# Patient Record
Sex: MALE | Race: WHITE
[De-identification: names, ages, dates, MRNs, and addresses within clinical notes are randomized per-mention and may not be internally consistent; named-entity substitution may affect disease eponyms.]

---

## 2024-08-19 ENCOUNTER — HOSPITAL ENCOUNTER (EMERGENCY)
Age: 29
Discharge: HOME | End: 2024-08-19
Payer: COMMERCIAL

## 2024-08-19 VITALS
HEART RATE: 87 BPM | TEMPERATURE: 98.06 F | SYSTOLIC BLOOD PRESSURE: 153 MMHG | OXYGEN SATURATION: 98 % | DIASTOLIC BLOOD PRESSURE: 96 MMHG

## 2024-08-19 VITALS — BODY MASS INDEX: 45 KG/M2

## 2024-08-19 DIAGNOSIS — R10.9: ICD-10-CM

## 2024-08-19 DIAGNOSIS — K52.9: Primary | ICD-10-CM

## 2024-08-19 LAB
ADD MANUAL DIFF: NO
ALANINE AMINOTRANSFERASE: 33 U/L (ref 16–63)
ALBUMIN GLOBULIN RATIO: 1.1
ALBUMIN LEVEL: 3.6 G/DL (ref 3.4–5)
ALKALINE PHOSPHATASE: 69 U/L (ref 46–116)
ANION GAP: 15.1
ASPARTATE AMINO TRANSFERASE: 14 U/L (ref 15–37)
BLOOD UREA NITROGEN: 10 MG/DL (ref 7–18)
CALCIUM: 9.1 MG/DL (ref 8.5–10.1)
CARBON DIOXIDE: 21.5 MMOL/L (ref 21–32)
CHLORIDE: 105 MMOL/L (ref 98–107)
CO2 BLD-SCNC: 21.5 MMOL/L (ref 21–32)
ESTIMATED GFR (AFRICAN AMERICA: >60 (ref 60–?)
ESTIMATED GFR (NON-AFRICAN AME: 59 (ref 60–?)
GLOBULIN: 3.4 G/DL
GLUCOSE BLD-MCNC: 90 MG/DL (ref 74–106)
GLUCOSE URINE UA: NEGATIVE MG/DL
HCT VFR BLD CALC: 48.3 % (ref 42–54)
HEMATOCRIT: 48.3 % (ref 42–54)
HEMOGLOBIN: 16.1 G/DL (ref 14–18)
IMMATURE GRANULOCYTES ABS AUTO: 0.03 10^3/UL (ref 0–0.03)
IMMATURE GRANULOCYTES PCT AUTO: 0.3 % (ref 0–0.5)
LACTATE/LACTIC ACID: 0.8 MMOL/L (ref 0.4–2)
LIPASE: 35 U/L (ref 16–77)
LYMPHOCYTES  ABSOLUTE AUTO: 2.9 10^3/UL (ref 1.2–3.8)
MCV RBC: 89.4 FL (ref 80–94)
MEAN CORPUSCULAR HEMOGLOBIN: 29.8 PG (ref 25.9–34)
MEAN CORPUSCULAR HGB CONC: 33.3 G/DL (ref 29.9–35.2)
MEAN CORPUSCULAR VOLUME: 89.4 FL (ref 80–94)
PLATELET # BLD: 216 10^3/UL (ref 150–450)
PLATELET COUNT: 216 10^3/UL (ref 150–450)
POTASSIUM SERPLBLD-SCNC: 3.6 MMOL/L (ref 3.5–5.1)
POTASSIUM: 3.6 MMOL/L (ref 3.5–5.1)
RED BLOOD COUNT: 5.4 10^6/UL (ref 4.7–6.1)
SODIUM BLD-SCNC: 138 MMOL/L (ref 136–145)
SODIUM: 138 MMOL/L (ref 136–145)
TOTAL PROTEIN: 7 G/DL (ref 6.4–8.2)
WBC # BLD: 9.9 10^3/UL (ref 4–11)
WHITE BLOOD COUNT: 9.9 10^3/UL (ref 4–11)

## 2024-08-19 PROCEDURE — 74177 CT ABD & PELVIS W/CONTRAST: CPT

## 2024-08-19 PROCEDURE — 96375 TX/PRO/DX INJ NEW DRUG ADDON: CPT

## 2024-08-19 PROCEDURE — 85025 COMPLETE CBC W/AUTO DIFF WBC: CPT

## 2024-08-19 PROCEDURE — 81003 URINALYSIS AUTO W/O SCOPE: CPT

## 2024-08-19 PROCEDURE — 99285 EMERGENCY DEPT VISIT HI MDM: CPT

## 2024-08-19 PROCEDURE — 96374 THER/PROPH/DIAG INJ IV PUSH: CPT

## 2024-08-19 PROCEDURE — 36415 COLL VENOUS BLD VENIPUNCTURE: CPT

## 2024-08-19 PROCEDURE — 83605 ASSAY OF LACTIC ACID: CPT

## 2024-08-19 PROCEDURE — 80053 COMPREHEN METABOLIC PANEL: CPT

## 2024-08-19 PROCEDURE — 96376 TX/PRO/DX INJ SAME DRUG ADON: CPT

## 2024-08-19 PROCEDURE — 83690 ASSAY OF LIPASE: CPT

## 2024-08-19 PROCEDURE — 96361 HYDRATE IV INFUSION ADD-ON: CPT

## 2024-08-19 NOTE — CT_ITS
The [address] [address] 
     [phone] 
  
  
Patient Name: 
HIMA CARDENAS 
  
MRN: [id number]    YOB: 1995    Sex: M 
Assigned Patient Location: ER 
Current Patient Location: ER 
Accession/Order Number: [id number] 
Exam Date: 8/19/2024  15:10    Report Date: 8/19/2024  15:53 
  
At the request of: 
SIOMARA DHILLON   
  
Procedure:  CT abdomen pelvis w con 
  
CT ABDOMEN/PELVIS WITH IV CONTRAST. 
  
INDICATION: Abdominal pain. 
  
COMPARISON: There are no other studies available for comparison. 
  
TECHNIQUE: Contiguous axial images were obtained from the lung bases to the  
pelvic floor following the intravenous administration of contrast. Coronal and  
  
sagittal reformations are provided.  
  
FINDINGS:  
  
LOWER LUNGS: Clear. 
  
LIVER/BILIARY TREE: No mass. No intrahepatic ductal dilatation. 
  
GALLBLADDER: No significant gallbladder wall thickening. No radiopaque stone. 
  
CBD: Normal CBD. 
  
SPLEEN: Normal in size. 
  
PANCREAS: No acute findings. No peripancreatic fluid or inflammation. No  
pancreatic duct dilatation. No discrete mass. 
  
ADRENALS: Normal.  
  
KIDNEYS: No hydronephrosis. No radiopaque calculus. 
  
STOMACH AND BOWEL: Stomach is unremarkable. No dilated bowel loops. No bowel  
wall thickening. 
  
APPENDIX: Appendicolith. Otherwise unremarkable appendix. 
  
PERITONEAL CAVITY: No fluid. No fat stranding. 
  
ABDOMINAL WALL: No subcutaneous stranding. There is an incidental 6 mm  
umbilical remnant cyst.. 
  
LYMPH NODES: No mesenteric or retroperitoneal lymphadenopathy by CT criteria.  
  
ABDOMINAL AORTA: No aneurysm. 
  
PELVIS: No acute abnormality. 
  
MUSCULOSKELETAL: No acute osseous abnormality. 
  
ORDER #: [id number] CT/CT abdomen pelvis w con  
IMPRESSION:  
   
No acute abnormality in the abdomen or pelvis.  
   
   
Electronically authenticated by: GAMAL HOLLEY   Date: 8/19/2024  15:53

## 2024-08-19 NOTE — ED_ITS
HPI - Abdominal Pain    
General    
Chief Complaint: Abdominal Pain    
Stated Complaint: ABDOMINAL PAIN/ NAUSEA/ VOMITTING    
Time Seen by Provider: 08/19/24 14:19    
Source: patient    
Mode of arrival: walk-in    
History of Present Illness    
HPI narrative:     
Patient is a 28-year-old male who presents to the emergency department for the   
evaluation of diffuse right-sided abdominal pain for the last 4 days associated   
with vomiting and diarrhea.  He states he was told at age 17 that he had  a   
problem with the appendix  and was told on imaging that his appendix was swollen  
from food he was eating.  He states he was told not to worry about this and he   
never had an appendectomy.  He states he had endoscopy and colonoscopy done   
about 5 years ago for increased acid reflux symptoms but has been taking an acid  
reflux medication and his symptoms were controlled.  He denies any fevers or   
upper respiratory symptoms.  He took a COVID test several days ago that was   
negative.  He complains of diffuse umbilical, right upper and right lower   
quadrant abdominal pain.  No blood in his stool.  No urinary symptoms.    
Related Data    
                                  Previous Rx's    
    
    
    
?Medication ?Instructions ?Recorded    
     
dicyclomine 20 mg tablet 20 mg PO QID PRN abdominal pain 08/19/24    
    
 #12 tabs     
     
ketorolac 10 mg tablet 10 mg PO TID PRN pain #10 tabs 08/19/24    
     
ondansetron 4 mg disintegrating 4 mg PO Q6H PRN nausea and 08/19/24    
    
tablet vomiting #12 tabs     
    
    
    
                                    Allergies    
    
    
    
Allergy/AdvReac Type Severity Reaction Status Date / Time    
     
No Known Drug Allergies Allergy   Verified 08/19/24 14:13    
    
    
    
    
Review of Systems    
    
    
ROS      
    
 Constitutional Denies: fever or chills       
    
 Ears, nose, mouth, and throat Denies: throat pain or nasal congestion       
    
 Respiratory Denies: shortness of breath       
    
 Gastrointestinal Reports: abdominal pain, nausea, vomiting and diarrhea       
    
 Musculoskeletal Denies: back pain or neck pain       
    
 Integumentary/Breast Denies: rash       
    
 Neurological Denies: headache, numbness in extremities or weakness in   
extremities       
    
 Hematologic/Lymphatic Denies: easy bruising or easy bleeding       
    
    
Exam    
Narrative    
Exam Narrative:     
Gen.: Awake, alert, in no distress    
Head: Normocephalic, atraumatic    
ENT: Moist mucous membranes    
Respiratory: No respiratory distress    
Gastrointestinal: Abdomen is soft, nondistended and diffuse tenderness of the   
right upper quadrant, right lower quadrant and umbilical abdomen with no   
guarding or rebound    
Extremities: Moves extremities equally, no injuries noted    
Psych: Normal mood and affect    
Neuro: No focal neuro deficit    
Skin: Warm, dry, intact    
    
Constitutional    
Vital Signs, click to edit/add:     
    
                                Last Vital Signs    
    
    
    
Temp  98.0 F   08/19/24 14:14    
     
Pulse  87   08/19/24 14:14    
     
Resp  18   08/19/24 14:14    
     
BP  153/96 H  08/19/24 14:14    
     
Pulse Ox  98   08/19/24 14:14    
     
O2 Del Method  Room Air  08/19/24 14:14    
    
    
    
    
    
Course    
Vital Signs    
Vital signs:     
    
                                   Vital Signs    
    
    
    
Temperature  98.0 F   08/19/24 14:14    
     
Pulse Rate  87   08/19/24 14:14    
     
Respiratory Rate  18   08/19/24 14:14    
     
Blood Pressure  153/96 H  08/19/24 14:14    
     
Pulse Oximetry  98   08/19/24 14:14    
     
Oxygen Delivery Method  Room Air  08/19/24 14:14    
    
    
                                            
    
    
    
Temperature  98.0 F   08/19/24 14:14    
     
Pulse Rate  87   08/19/24 14:14    
     
Respiratory Rate  18   08/19/24 14:14    
     
Blood Pressure  153/96 H  08/19/24 14:14    
     
Pulse Oximetry  98   08/19/24 14:14    
     
Oxygen Delivery Method  Room Air  08/19/24 14:14    
    
    
    
    
    
MDM - Abdominal Pain    
MDM Narrative    
Medical decision making narrative:     
Patient was given IV fluids, Zofran, Levsin.  He reported continued discomfort a  
nd nausea and was given additional Toradol and Zofran.  Abdomen is soft and   
benign.  Lab studies show minimally elevated bilirubin but no elevation of LFTs   
or lipase.  CT of the abdomen and pelvis with no evidence of gallstones or   
thickening of the gallbladder.  Appendix is visualized, within normal limits.    
Patient discharged home with presumptive diagnosis of gastroenteritis, he was   
encouraged to increase fluids, Zofran, Bentyl given for home.  Return to the ER   
if symptoms change or worsen.    
    
    
SUPERVISED APC VISIT, PHYSICIAN ATTESTATION:     
    
Based on the medical record the care appears appropriate.    
    
?    
Medical Records    
Attestation: I reviewed the patient's medical records.    
Lab Data    
Attestation: I reviewed the patient's lab results.    
Labs:     
    
                                   Lab Results    
    
    
    
  08/19/24 08/19/24 Range/Units    
    
  14:31 16:00     
     
WBC  9.9   (4.0-11.0)  10^3/uL    
     
RBC  5.40   (4.70-6.10)  10^6/uL    
     
Hgb  16.1   (14.0-18.0)  g/dL    
     
Hct  48.3   (42.0-54.0)  %    
     
MCV  89.4   (80.0-94.0)  fL    
     
MCH  29.8   (25.9-34.0)  pg    
     
MCHC  33.3   (29.9-35.2)  g/dL    
     
RDW  12.6   (11.0-15.0)  %    
     
Plt Count  216   (150-450)  10^3/uL    
     
MPV  10.2   (9.5-13.5)  fL    
     
Neut % (Auto)  58.9   (43.0-75.0)  %    
     
Lymph % (Auto)  29.5   (20.5-60.0)  %    
     
Mono % (Auto)  8.0   (1.7-12.0)  %    
     
Eos % (Auto)  2.5   (0.9-7.0)  %    
     
Baso % (Auto)  0.8   (0.2-2.0)  %    
     
Neut # (Auto)  5.9   (1.4-6.5)  10^3/uL    
     
Lymph # (Auto)  2.9   (1.2-3.8)  10^3/uL    
     
Mono # (Auto)  0.8   (0.3-0.8)  10^3/uL    
     
Eos # (Auto)  0.3   (0.0-0.7)  10^3/uL    
     
Baso # (Auto)  0.1   (0.0-0.1)  10^3/uL    
     
Abs Immat Gran (auto)  0.03   (0.00-0.03)  10^3/uL    
     
Imm/Tot Granulo (auto)  0.3   (0.0-0.5)  %    
     
Sodium  138   (136-145)  mmol/L    
     
Potassium  3.6   (3.5-5.1)  mmol/L    
     
Chloride  105   ()  mmol/L    
     
Carbon Dioxide  21.5   (21.0-32.0)  mmol/L    
     
Anion Gap  15.1       
     
BUN  10.0   (7.0-18.0)  mg/dL    
     
Creatinine  1.42 H   (0.70-1.30)  mg/dL    
     
Est GFR ( Amer)  >60   (>=60)      
     
Est GFR (Non-Af Amer)  59 L   (>=60)      
     
BUN/Creatinine Ratio  7.0       
     
Glucose  90   ()  mg/dL    
     
Lactate  0.8   (0.4-2.0)  mmol/L    
     
Calcium  9.1   (8.5-10.1)  mg/dL    
     
Total Bilirubin  1.1 H   (0.2-1.0)  mg/dL    
     
AST  14 L   (15-37)  U/L    
     
ALT  33   (16-63)  U/L    
     
Alkaline Phosphatase  69   ()  U/L    
     
Total Protein  7.0   (6.4-8.2)  g/dL    
     
Albumin  3.6   (3.4-5.0)  g/dL    
     
Globulin  3.4   g/dL    
     
Albumin/Globulin Ratio  1.1       
     
Lipase  35.0   (16.0-77.0)  U/L    
     
Urine Color   Lt. yellow  (YELLOW)      
     
Urine Clarity   Clear  (CLEAR)      
     
Urine pH   6.0  (5.0-9.0)      
     
Ur Specific Gravity   <=1.005 A  (1.005-1.025)      
     
Urine Protein   Negative  (NEG/TRACE)  mg/dL    
     
Urine Glucose (UA)   Negative  (NEGATIVE)  mg/dL    
     
Urine Ketones   Negative  (NEGATIVE)  mg/dL    
     
Urine Occult Blood   Negative  (NEGATIVE)      
     
Urine Nitrite   Negative  (NEGATIVE)      
     
Urine Bilirubin   Negative  (NEGATIVE)      
     
Urine Urobilinogen   0.2  (0.2-1.0)  EU/dL    
     
Ur Leukocyte Esterase   Negative  (NEGATIVE)      
    
    
    
    
Imaging Data    
CT scan - abdomen:     
      Attestation: I have reviewed the pertinent imaging results.    
      Radiologist's impression:     
    
ITS Impressions    
    
Abdomen/Pelvis CT  08/19/24 14:19    
IMPRESSION:    
     
No acute abnormality in the abdomen or pelvis.    
     
     
Electronically authenticated by: GAMAL HOLLEY   Date: 8/19/2024  15:53    
    
    
    
    
    
Discharge Plan    
Discharge    
Stand Alone Forms:  Portal Instructions    
    
Chief Complaint: Abdominal Pain    
    
Clinical Impression:    
 Abdominal pain, Gastroenteritis    
    
    
Patient Disposition: Home, Self-Care    
    
Time of Disposition Decision: 16:50    
    
Condition: Good    
    
Prescriptions / Home Meds:    
New    
  ketorolac 10 mg tablet     
   10 mg PO TID PRN (Reason: pain) Qty: 10 0RF    
  dicyclomine 20 mg tablet     
   20 mg PO QID PRN (Reason: abdominal pain) Qty: 12 0RF    
  ondansetron 4 mg tablet,disintegrating     
   4 mg PO Q6H PRN (Reason: nausea and vomiting) Qty: 12 0RF    
    
Print Language: English    
    
Instructions:  Gastroenteritis (ED), Acute Abdominal Pain (ED)    
    
Referrals:    
BELINDA MOORE [Primary Care Provider] - 1 week No

## 2024-08-19 NOTE — ED.ABDPAIN1
HPI - Abdominal Pain
General
Chief Complaint: Abdominal Pain
Stated Complaint: ABDOMINAL PAIN/ NAUSEA/ VOMITTING
Time Seen by Provider: 08/19/24 14:19
Source: patient
Mode of arrival: walk-in
History of Present Illness
HPI narrative: 
Patient is a 28-year-old male who presents to the emergency department for the evaluation of diffuse right-sided abdominal pain for the last 4 days associated with vomiting and diarrhea.  He states he was told at age 17 that he had  a problem with 
the appendix  and was told on imaging that his appendix was swollen from food he was eating.  He states he was told not to worry about this and he never had an appendectomy.  He states he had endoscopy and colonoscopy done about 5 years ago for 
increased acid reflux symptoms but has been taking an acid reflux medication and his symptoms were controlled.  He denies any fevers or upper respiratory symptoms.  He took a COVID test several days ago that was negative.  He complains of diffuse 
umbilical, right upper and right lower quadrant abdominal pain.  No blood in his stool.  No urinary symptoms.
Related Data
Previous Rx's

?Medication ?Instructions ?Recorded
dicyclomine 20 mg tablet 20 mg PO QID PRN abdominal pain 08/19/24
 #12 tabs 
ketorolac 10 mg tablet 10 mg PO TID PRN pain #10 tabs 08/19/24
ondansetron 4 mg disintegrating 4 mg PO Q6H PRN nausea and 08/19/24
tablet vomiting #12 tabs 


Allergies

Allergy/AdvReac Type Severity Reaction Status Date / Time
No Known Drug Allergies Allergy   Verified 08/19/24 14:13



Review of Systems
ROS  
 Constitutional Denies: fever or chills   
 Ears, nose, mouth, and throat Denies: throat pain or nasal congestion   
 Respiratory Denies: shortness of breath   
 Gastrointestinal Reports: abdominal pain, nausea, vomiting and diarrhea   
 Musculoskeletal Denies: back pain or neck pain   
 Integumentary/Breast Denies: rash   
 Neurological Denies: headache, numbness in extremities or weakness in extremities   
 Hematologic/Lymphatic Denies: easy bruising or easy bleeding   

Exam
Narrative
Exam Narrative: 
Gen.: Awake, alert, in no distress
Head: Normocephalic, atraumatic
ENT: Moist mucous membranes
Respiratory: No respiratory distress
Gastrointestinal: Abdomen is soft, nondistended and diffuse tenderness of the right upper quadrant, right lower quadrant and umbilical abdomen with no guarding or rebound
Extremities: Moves extremities equally, no injuries noted
Psych: Normal mood and affect
Neuro: No focal neuro deficit
Skin: Warm, dry, intact

Constitutional
Vital Signs, click to edit/add: 

Last Vital Signs

Temp  98.0 F   08/19/24 14:14
Pulse  87   08/19/24 14:14
Resp  18   08/19/24 14:14
BP  153/96 H  08/19/24 14:14
Pulse Ox  98   08/19/24 14:14
O2 Del Method  Room Air  08/19/24 14:14




Course
Vital Signs
Vital signs: 

Vital Signs

Temperature  98.0 F   08/19/24 14:14
Pulse Rate  87   08/19/24 14:14
Respiratory Rate  18   08/19/24 14:14
Blood Pressure  153/96 H  08/19/24 14:14
Pulse Oximetry  98   08/19/24 14:14
Oxygen Delivery Method  Room Air  08/19/24 14:14



Temperature  98.0 F   08/19/24 14:14
Pulse Rate  87   08/19/24 14:14
Respiratory Rate  18   08/19/24 14:14
Blood Pressure  153/96 H  08/19/24 14:14
Pulse Oximetry  98   08/19/24 14:14
Oxygen Delivery Method  Room Air  08/19/24 14:14




MDM - Abdominal Pain
MDM Narrative
Medical decision making narrative: 
Patient was given IV fluids, Zofran, Levsin.  He reported continued discomfort and nausea and was given additional Toradol and Zofran.  Abdomen is soft and benign.  Lab studies show minimally elevated bilirubin but no elevation of LFTs or lipase.  
CT of the abdomen and pelvis with no evidence of gallstones or thickening of the gallbladder.  Appendix is visualized, within normal limits.  Patient discharged home with presumptive diagnosis of gastroenteritis, he was encouraged to increase 
fluids, Zofran, Bentyl given for home.  Return to the ER if symptoms change or worsen.


SUPERVISED APC VISIT, PHYSICIAN ATTESTATION: 

Based on the medical record the care appears appropriate.

?
Medical Records
Attestation: I reviewed the patient's medical records.
Lab Data
Attestation: I reviewed the patient's lab results.
Labs: 

Lab Results

  08/19/24 08/19/24 Range/Units
  14:31 16:00 
WBC  9.9   (4.0-11.0)  10^3/uL
RBC  5.40   (4.70-6.10)  10^6/uL
Hgb  16.1   (14.0-18.0)  g/dL
Hct  48.3   (42.0-54.0)  %
MCV  89.4   (80.0-94.0)  fL
MCH  29.8   (25.9-34.0)  pg
MCHC  33.3   (29.9-35.2)  g/dL
RDW  12.6   (11.0-15.0)  %
Plt Count  216   (150-450)  10^3/uL
MPV  10.2   (9.5-13.5)  fL
Neut % (Auto)  58.9   (43.0-75.0)  %
Lymph % (Auto)  29.5   (20.5-60.0)  %
Mono % (Auto)  8.0   (1.7-12.0)  %
Eos % (Auto)  2.5   (0.9-7.0)  %
Baso % (Auto)  0.8   (0.2-2.0)  %
Neut # (Auto)  5.9   (1.4-6.5)  10^3/uL
Lymph # (Auto)  2.9   (1.2-3.8)  10^3/uL
Mono # (Auto)  0.8   (0.3-0.8)  10^3/uL
Eos # (Auto)  0.3   (0.0-0.7)  10^3/uL
Baso # (Auto)  0.1   (0.0-0.1)  10^3/uL
Abs Immat Gran (auto)  0.03   (0.00-0.03)  10^3/uL
Imm/Tot Granulo (auto)  0.3   (0.0-0.5)  %
Sodium  138   (136-145)  mmol/L
Potassium  3.6   (3.5-5.1)  mmol/L
Chloride  105   ()  mmol/L
Carbon Dioxide  21.5   (21.0-32.0)  mmol/L
Anion Gap  15.1   
BUN  10.0   (7.0-18.0)  mg/dL
Creatinine  1.42 H   (0.70-1.30)  mg/dL
Est GFR ( Amer)  >60   (>=60)  
Est GFR (Non-Af Amer)  59 L   (>=60)  
BUN/Creatinine Ratio  7.0   
Glucose  90   ()  mg/dL
Lactate  0.8   (0.4-2.0)  mmol/L
Calcium  9.1   (8.5-10.1)  mg/dL
Total Bilirubin  1.1 H   (0.2-1.0)  mg/dL
AST  14 L   (15-37)  U/L
ALT  33   (16-63)  U/L
Alkaline Phosphatase  69   ()  U/L
Total Protein  7.0   (6.4-8.2)  g/dL
Albumin  3.6   (3.4-5.0)  g/dL
Globulin  3.4   g/dL
Albumin/Globulin Ratio  1.1   
Lipase  35.0   (16.0-77.0)  U/L
Urine Color   Lt. yellow  (YELLOW)  
Urine Clarity   Clear  (CLEAR)  
Urine pH   6.0  (5.0-9.0)  
Ur Specific Gravity   <=1.005 A  (1.005-1.025)  
Urine Protein   Negative  (NEG/TRACE)  mg/dL
Urine Glucose (UA)   Negative  (NEGATIVE)  mg/dL
Urine Ketones   Negative  (NEGATIVE)  mg/dL
Urine Occult Blood   Negative  (NEGATIVE)  
Urine Nitrite   Negative  (NEGATIVE)  
Urine Bilirubin   Negative  (NEGATIVE)  
Urine Urobilinogen   0.2  (0.2-1.0)  EU/dL
Ur Leukocyte Esterase   Negative  (NEGATIVE)  



Imaging Data
CT scan - abdomen: 
      Attestation: I have reviewed the pertinent imaging results.
      Radiologist's impression: 

ITS Impressions

Abdomen/Pelvis CT  08/19/24 14:19
IMPRESSION:
 
No acute abnormality in the abdomen or pelvis.
 
 
Electronically authenticated by: GAMAL HOLLEY   Date: 8/19/2024  15:53





Discharge Plan
Discharge
Stand Alone Forms:  Portal Instructions

Chief Complaint: Abdominal Pain

Clinical Impression:
 Abdominal pain, Gastroenteritis


Patient Disposition: Home, Self-Care

Time of Disposition Decision: 16:50

Condition: Good

Prescriptions / Home Meds:
New
  ketorolac 10 mg tablet 
   10 mg PO TID PRN (Reason: pain) Qty: 10 0RF
  dicyclomine 20 mg tablet 
   20 mg PO QID PRN (Reason: abdominal pain) Qty: 12 0RF
  ondansetron 4 mg tablet,disintegrating 
   4 mg PO Q6H PRN (Reason: nausea and vomiting) Qty: 12 0RF

Print Language: English

Instructions:  Gastroenteritis (ED), Acute Abdominal Pain (ED)

Referrals:
BELINDA MOORE [Primary Care Provider] - 1 week

## 2024-10-17 ENCOUNTER — HOSPITAL ENCOUNTER
Dept: HOSPITAL 101 - MRI | Age: 29
Discharge: HOME | End: 2024-10-17
Payer: COMMERCIAL

## 2024-10-17 DIAGNOSIS — M47.814: ICD-10-CM

## 2024-10-17 DIAGNOSIS — M51.24: Primary | ICD-10-CM

## 2024-10-17 PROCEDURE — 72146 MRI CHEST SPINE W/O DYE: CPT

## 2024-10-17 NOTE — MR_ITS
[facility] [place] [id number] 
     [phone] 
  
  
Patient Name: 
HIMA CARDENAS 
  
MRN: [id number]    YOB: 1995    Sex: M 
Assigned Patient Location: MRI 
Current Patient Location:   
Accession/Order Number: [id number] 
Exam Date: 10/17/2024  08:55    Report Date: 10/18/2024  08:31 
  
At the request of: 
CHIKA RONQUILLO   
  
Procedure:  MR thoracic spine wo con 
  
EXAMINATION: MR thoracic spine wo con  
  
HISTORY: Mid Back Pain, Disc Displacement Thoracic 
  
COMPARISON: No relevant comparison available.  
  
TECHNIQUE: Axial T1 and T2; Sagittal T1, T2, and STIR sequences. Images were  
performed without contrast. 
  
FINDINGS: 
CORD: Normal caliber, contour, and signal intensity.  
BONES: Normal alignment of the thoracic spine with no acute fracture or  
spondylolisthesis. No bone edema. 
DISCS: Mild degenerative changes T7-T8. Mild posterior disc protrusion T8-T9  
measuring 2 mm obliterating the anterior thecal CSF space but without central  
canal stenosis 
PARASPINAL AREA: No visible mass.  
OTHER: Negative. No abnormal contrast enhancement.  
  
ORDER #: [id number] MR/MR thoracic spine wo con  
IMPRESSION:   
   
Mild discogenic changes at T7-T8 and T7 T9, this corresponds to the patient's   
back pain demarcated with a BB marker  
   
   
Electronically authenticated by: ABIGAIL FORD   Date: 10/18/2024  08:31

## 2024-10-17 NOTE — XMS_ITS
Comprehensive CCD (C-CDA v2.1)  
  
                          Created on: 2024  
  
  
Michi Tolentino  
External Reference #: CDR,PersonID:[id number]  
: 1995  
Sex: Male  
  
Demographics  
  
  
                                        Address             [address] [id number]  
   
                                        Home Phone          [phone]  
   
                                        Mobile Phone        [phone]  
   
                                        Preferred Language  en  
   
                                        Marital Status      Single  
   
                                        [employer] Affiliation Unknown  
   
                                        Race                White  
   
                                        Ethnic Group        Not  or Lati  
no  
  
  
Author  
  
  
                                        Organization        [facility]  
  
  
Care Team Providers  
  
  
                                Care Team Member Name Role            Phone  
   
                                BELINDA FAM Primary Care Physician [phone]  
   
                                CHIKA RONQUILLO  Admitting       Unavailable  
   
                                SHIMA, CHIKA  Attending       Unavailable  
   
                                ZIEBER, DR CHESTER ZHANG Consulting      Unavailable  
   
                                NICARYN, CHIKA  Consulting      Unavailable  
   
                                VIET, DR CUBA Admitting       Unavailable  
   
                                VIET, DR CUBA Attending       Unavailable  
   
                                VIET, DR CUBA Consulting      Unavailable  
   
                                MISC, DR DIOR Consulting      Unavailable  
   
                                KLYMOMARI    Consulting      Unavailable  
   
                                MISC, DR DIOR Admitting       Unavailable  
   
                                MISC, DR DIOR Attending       Unavailable  
   
                                SARWAT, DR BELINDA SALCIDO Primary Care    Unavailable  
   
                                [place], DR ABIGAIL ZAFAR Consulting      Unavailable  
   
                                MISC, DR DIOR Consulting      Unavailable  
   
                                DO ANTON Fam Primary Care Provider [phone]  
   
                                MONICA Gallagher Attending Provider [phone]  
   
                                Effie Fam Jr. Unavailable     [phone]  
   
                                DO ANTON Fam Primary Care Provider [phone]  
   
                                MD Matthew Agosto Attending Provider [phone]  
   
                                MONICA Gallagher Attending Provider [phone]  
   
                                Sarwat Cox DO, George Robert Unavailable     [phone]  
   
                                Wiliam Cox MD, William E Unavailable     [phone]   
   
                                Balbir Ronquillo Unavailable     [phone]   
   
                                BABAR CASTILLO   Referring       Unavailable  
   
                                BABAR CASTILLO   Attending       Unavailable  
   
                                BABAR CASTILLO   Attending       Unavailable  
   
                                Sarwat Cox DO, George R Primary Care Provider [phone]  
[phone]  
   
                                Effie Fam DO Primary Care Provider [phone]  
   
                                Effie Fam DO Unavailable     [phone]  
[phone]  
   
                                DO ANTON Fam Primary Care Provider [phone]  
   
                                MD Daljit Dove Attending Provider [phone]  
   
                                DO Mina Castanoned HIEN Emergency Provider [phone]  
   
                                MONICA GALLAGHER Attending       Unavailab  
MONICA Acosta Attending       Unavailab  
MONICA Acosta Attending       UnavailMD Matthew Keane Attending Provider [phone]  
   
                                BALBIR RONQUILLO Attending       Unavailable  
   
                                SARWAT JR, EFFIE R Referring       Unavailable  
   
                                KAFTAN JR, EFFIE R Primary Care    Unavailable  
   
                                BALBIR RONQUILLO Attending       Unavailable  
   
                                SARWAT JR, EFFIE R Referring       Unavailable  
   
                                KAFTAN JR, EFFIE R Primary Care    Unavailable  
   
                                MACK SWAIN Admitting       Unavailable  
   
                                MACK SWAIN Attending       Unavailable  
   
                                SARWAT JR, EFFIE R Referring       Unavailable  
   
                                KAFTAN JR, EFFIE R Primary Care    Unavailable  
   
                                MACK SWAIN Attending       Unavailable  
   
                                MACK SWAIN Referring       Unavailable  
   
                                KAFTAN JR, EFFIE R Primary Care    Unavailable  
   
                                ALON MCPHERSON     Attending       Unavailable  
   
                                KAFTAN JR, EFFIE R Primary Care    Unavailable  
   
                                BALBIR RONQUILLO Attending       Unavailable  
   
                                SARWAT JR, EFFIE R Referring       Unavailable  
   
                                KAFTAN JR, EFFIE R Primary Care    Unavailable  
   
                                MACK SWAIN Admitting       Unavailable  
   
                                MACK SWAIN Attending       Unavailable  
   
                                JAMARCUSAN JR, EFFIE R Referring       Unavailable  
   
                                KAFTAN JR, EFFIE R Primary Care    Unavailable  
   
                                MACK SWAIN Attending       Unavailable  
   
                                MACK SWAIN Referring       Unavailable  
   
                                KAFTAN JR, EFFIE R Primary Care    Unavailable  
   
                                ALON MCPHERSON     Attending       Unavailable  
   
                                SARWAT JR, EFFIE R Primary Care    Unavailable  
   
                                BALBIR RONQUILLO Attending       Unavailable  
   
                                SARWAT JR, EFFIE R Referring       Unavailable  
   
                                KAFTAN JR, EFFIE R Primary Care    Unavailable  
   
                                BALBIR RONQUILLO Attending       Unavailable  
   
                                SARWAT JR, EFFIE R Referring       Unavailable  
   
                                KAFTAN JR, EFFIE R Primary Care    Unavailable  
   
                                MACK SWAIN Attending       Unavailable  
   
                                MACK SWAIN Referring       Unavailable  
   
                                KAFTDAIN JR, EFFIE R Primary Care    Unavailable  
   
                                MACK SWAIN Admitting       Unavailable  
   
                                MACK SWAIN Attending       Unavailable  
   
                                SARWAT JR, EFFIE R Referring       Unavailable  
   
                                KAFTAN JR, EFFIE R Primary Care    Unavailable  
   
                                ANITA MCDOWELL Attending       Unavailable  
   
                                KAFTAN JR, EFFIE R Primary Care    Unavailable  
   
                                KAFTAN, EFFIE R Attending       Unavailable  
   
                                SARWAT, EFFIE R Referring       Unavailable  
   
                                SHWETA CROOKS   Attending       Unavailable  
   
                                KAREBECAAN, EFFIE ZHANG Attending       Unavailable  
   
                                KAREBECAAN, EFFIE R Referring       Unavailable  
   
                                JOSE A SANDERS Attending       Unavailable  
   
                                JAMARCUSAN, EFFIE R Attending       Unavailable  
   
                                KAFTAN, EFFIE R Referring       Unavailable  
   
                                ALEXIS VEGA   Attending       Unavailable  
   
                                SARWAT, EFFIE R Referring       Unavailable  
   
                                ALEXIS VEGA   Attending       Unavailable  
   
                                AGOSTO MATTHEW ZAFAR Attending       Unavailable  
   
                                LIORYALEXIS L   Referring       Unavailable  
   
                                AGOSTO MATTHEW ZAFAR Attending       Unavailable  
   
                                ALEXIS VEGA L   Attending       Unavailable  
   
                                KAREBECAAN, EFFIE R Referring       Unavailable  
   
                                KAREBECAAN, EFFIE R Attending       Unavailable  
   
                                Asad Castanon  Admitting       Unavailable  
   
                                Asad Castanon  Attending       Unavailable  
   
                                ANTON Fam Primary Care    Unavailable  
   
                                Matthew Agosto  Attending       Unavailable  
   
                                ANTON Fam Primary Care    Unavailable  
   
                                Kehinde Agostot  Admitting       Unavailable  
   
                                ANTON Fam Primary Care    Unavailable  
   
                                Daljit Dove Admitting       Unavailab  
Daljit Stephenson Attending       Unavailab  
ANTON Holliday Primary Care    Unavailable  
   
                                Surendra, Matthew  Admitting       Unavailable  
   
                                Matthew Agosto  Attending       Unavailable  
   
                                Buzz Diaz Admitting       Unavaila  
Buzz Barrett Attending       Unavaila  
ble  
   
                                EFFIE FAM Primary Care    Unavailable  
   
                                Lian Gallagher Admitting       Unavailable  
   
                                Lian Gallagher Attending       Unavailable  
   
                                EFFIE FAM Primary Care    Unavailable  
   
                                EFFIE FAM Primary Care    Unavailable  
   
                                JACOB Ronquillo Admitting       Unavailab  
JACOB Markham Attending       Unavailab  
stephan  
  
  
  
Allergies  
  
  
                                                    Allergy   
Classification                          Reported   
Allergen(s)               Allergy Type              Date of   
Onset                     Reaction(s)               Facility  
   
                                                      
(14 sources)                            Shellfish;   
Translations:   
[shellfish]         Drug allergy                            Itching   
(finding)                               [facility]   
[facility]  
Work Phone:   
[phone]  
   
                                                      
(3 sources)                             Shellfish;   
Translations:   
[SHELLFISH   
CONTAINING   
PRODUCTS]                 Drug Allergy                
3                         Itching                   [facility]  
Work Phone:   
[phone]  
   
                                                      
(13 sources)                            Shellfish;   
Translations:   
[SHELLFISH   
DERIVED]                                Allergy to   
substance                                 
3                         Itching                   [facility]  
   
                                                      
(10 sources)                            Other;   
Translations:   
[OTHER]                                 Propensity to   
adverse   
reactions                                 
1                                       Other (See   
Comments),   
Other                                   [facility]  
   
                                                      
(3 sources)               Shellfish                 Allergy to   
substance                                 
3                         Itching                   [facility]   
Healthcare  
Work Phone:   
[phone]  
   
                                                      
(3 sources)                             Shellfish-Deriv  
ed Products               Drug Allergy                
1                                                   [facility]   
Healthcare  
   
                                                      
(3 sources)                             Morphine;   
Translations:   
[morphine]                Drug Allergy                
4                         [facility]  
  
  
  
Medications  
Current Medications  
  
  
  
                      Medication Drug Class(es) Dates      Sig (Normalized) Sig   
(Original)  
   
                                                    acetaminophen 325 mg   
/ HYDROcodone   
bitartrate 5 mg oral   
tablet  
(5 sources)               Opioid Agonist            Start:   
2024                              take 1 tablet by   
mouth every six   
hours                                   Hydrocodone-Aceta  
minophen Active 1   
TAB PO Q6H 28   
  
  
  
                                                    Start: 2023  
End: 2024                         take 1 tablet by mouth   
every six hours                         Hydrocodone-Acetaminophen Discontinued 1  
   
TAB PO Q6H  10:45am  
  
  
  
                                                    ARIPiprazole 5 mg   
oral tablet  
(1 source)                              Atypical   
Antipsychotic                           Start:   
2021                              take 1 tablet   
by mouth at   
bedtime                                 aripiprazole 5 mg   
Tab take 1 tablet   
by mouth at   
bedtime Start   
Date: 21   
Status: Ordered  
   
                                                    busPIRone   
hydrochloride 5 mg   
oral tablet  
(19 sources)                                        Start:   
2023                                          busPIRone 5 mg Tab   
Refills(s) 0 Start   
Date: 23   
Status: Ordered  
  
  
  
                                Start: 2023 take 10 mg by mouth twice elida  
y Buspirone Active 10 MG PO   
Twice   
daily 2023 12:00am  
   
                                        Start: 2023   take 1 tablet by james  
th in the   
morning, then take 1 tablet by   
mouth at bedtime                        busPIRone (BUSPAR) 10 mg tablet   
Take 1 tablet (10 mg total) by   
mouth in the morning and 1 tablet   
(10 mg total) before bedtime.   
2023 Active  
  
  
  
                                                    carisoprodol 350 mg   
oral tablet  
(7 sources)         Muscle Relaxant     Start: 2021   take 1 tablet   
by mouth three   
times daily                             Soma 350 mg Tab   
350 mg = 1   
tab(s), Oral,   
TID, Refills(s) 0   
Start Date:   
21 Status:   
Ordered  
   
                                                    cetirizine   
hydrochloride 10 mg   
oral tablet  
(8 sources)                             Histamine-1   
Receptor   
Antagonist                Start: 2023         take 5 mg by   
mouth in the   
morning                                 cetirizine   
(ZyrTEC) 10 MG   
tablet Take 5 mg   
by mouth in the   
morning. 0   
2023 Active  
  
  
  
                                        Start: 2023   take 1 tablet by james  
 once   
daily                                   Cetirizine (Zyrtec) 10 mg Tablet   
Active 10 MG PO Daily 2023 12:00am  
  
  
  
                                                    clomiPHENE   
citrate 50 mg   
oral tablet  
(20 sources)                            Estrogen   
Agonist/Antagonist                      Start:   
2023                              take 1 tablet   
by mouth in   
the morning                             CLOMID 50 mg   
tablet Take 1   
tablet (50 mg   
total) by mouth   
in the morning.   
2023   
Active  
  
  
  
                                Start: 2023                 Clomiphene Cit  
rate (Clomid) 50 mg tablet Active 25 MG PO   
Every   
morning 2023 12:00am  
  
  
  
                                        Comment on above:   Take 25 mg by mouth.  
   
   
                                                    clotrimazole 10 mg/ml   
topical cream  
(3 sources)               Azole Antifungal          Start:   
12-  
3                                                   clotrimazole   
(Lotrimin) 1 % cream   
Apply 1 application   
topically in the   
morning and 1   
application before   
bedtime. 0   
2023 Active  
   
                                                    diclofenac sodium 50   
mg delayed release   
oral tablet  
(20 sources)                            Nonsteroidal   
Anti-inflammatory   
Drug                                    Start:   
  
4                                       take 1 tablet   
by mouth three   
times daily                             diclofenac   
(VOLTAREN) 50 mg EC   
tablet Take 1 tablet   
(50 mg total) by   
mouth 3 (three)   
times a day. 90   
tablet 3 2024   
Active  
  
  
  
                                                    Start: 2021  
End: 2024                         take 50 mg by mouth three times   
daily                                   Diclofenac Potassium Active 50 MG PO   
Three times daily 2023 12:00am  
  
  
  
                                        Comment on above:   1 tablet Orally 3 ti  
mes a day as needed   
   
                                                    dicyclomine   
hydrochloride 20 mg   
oral tablet  
(2 sources)               Anticholinergic           Start:   
20                                      take 20 mg by   
mouth once   
daily                                   Dicyclomine Active   
20 MG PO Daily   
2024   
12:00am  
   
                                                    DULoxetine 60 mg   
delayed release oral   
capsule  
(20 sources)                            Serotonin and   
Norepinephrine Reuptake   
Inhibitor                               Start:   
20                                      take 1 capsule   
by mouth in the   
morning                                 DULoxetine   
(CYMBALTA) 60 mg   
capsule Take 1   
capsule (60 mg   
total) by mouth in   
the morning.   
2021 Active  
  
  
  
                                        Start: 2021   take 1 capsule by mo  
uth once   
daily                                   DULoxetine 30 mg Cap-EC take 1   
capsule by mouth once daily Start   
Date: 21 Status: Ordered  
  
  
  
                                        Comment on above:   1 capsule.   
   
                                                    DULoxetine 30 mg   
Cap-EC  
(9 sources)                                         Start:   
20                                      take 1 capsule   
by mouth once   
daily                                   DULoxetine 30 mg   
Cap-EC take 1 capsule   
by mouth once daily   
Start Date: 21   
Status: Ordered  
   
                                                    duloxetine 60 mg   
Cap-DR  
(1 source)                                          Start:   
20                                                  duloxetine 60 mg   
Cap-DR Oral Start   
Date: 24 Status:   
Ordered  
   
                                                    hydrOXYzine  
(7 sources)               Antihistamine             Start:   
20                                                  hydrOXYzine 25 mg   
Start Date: 3/23/22   
Status: Ordered  
   
                                                    iv contrast (will be   
provided with   
radiology test)  
(1 source)                                          Start:   
20  
End:   
05-10-20  
23                                                  iv contrast (will be   
provided with   
radiology test) MRI   
LSP Inject,   
intravenously, once   
for 1 dose. No IV   
access, insert saline   
lock prior to the   
beginning of   
sedation, infusion,   
injection of imaging   
exam. Discontinue   
saline lock post   
exam. If Pt. has a   
central line or IVAD,   
may access for   
administration   
according to line   
specific nursing   
protocol. Once exam   
is complete flush   
line and de-access   
according to line   
specific nursing   
protocol in the MR   
contrast   
administration   
guidelines link. 1   
Each 0 2023   
05/10/2023 Active  
   
                                        Comment on above:   MRI LSP Inject, intr  
avenously, once for 1 dose. No IV access,   
insert saline lock prior to the beginning of sedation, infusion,   
injection of imaging exam. Discontinue saline lock post exam. If   
Pt. has a central line or IVAD, may access for administration   
according to line specific nursing protocol. Once exam is   
complete flush line and de-access according to line specific   
nursing protocol in the MR contrast administration guidelines   
link.   
   
                                                    ketoconazole 20   
mg/ml topical cream  
(2 sources)               Azole Antifungal          Start:   
20                                                  ketoconazole   
(NIZOral) 2 % cream   
Indications: Ringworm   
Apply topically Daily   
30 g 1 2024   
Active  
  
  
  
                                Start: 2024                 ketoconazole (  
NIZOral) 2 % cream Indications: Ringworm Apply   
topically Daily 30 g 1 2024 Active  
  
  
  
                                                    ketorolac   
tromethamine 10 mg   
oral tablet  
(2 sources)                             Nonsteroidal   
Anti-inflammatory Drug,   
Cyclooxygenase   
Inhibitor                               Start:   
2024                              take 10 mg   
by mouth   
once daily                              Ketorolac Active   
10 MG PO Daily   
2024 12:00am  
   
                                                    lamoTRIgine 25 mg   
oral tablet  
(20 sources)                            Mood Stabilizer,   
Anti-epileptic Agent                    Start:   
2023                              take 50 mg   
by mouth   
once daily   
in the   
morning                                 Lamotrigine   
Active 50 MG PO   
Every morning   
2023 12:00am  
  
  
  
                                Start: 2022 take 1 mg by mouth twice daily  
 lamotrigine 25 mg Tab mg   
tab(s),   
Oral, BID, Refills(s) 0 Start Date:   
3/23/22 Status: Ordered  
   
                                        Start: 2022   take 2 tablets by mo  
uth in the   
morning                                 lamoTRIgine (LaMICtal) 25 mg tablet   
Take 2 tablets (50 mg total) by   
mouth in the morning. 2022   
Active  
  
  
  
                                        Comment on above:   Take 50 mg by mouth   
once daily.   
   
                                                    lumateperone 42 mg   
oral capsule  
(5 sources)                                         Start:   
  
4                                       take 1 capsule   
by mouth in the   
morning                                 lumateperone   
(CAPLYTA) 42 mg   
capsule capsule Take   
1 capsule (42 mg   
total) by mouth in   
the morning.   
2024 Active  
   
                                                    24 hr mirabegron 25   
mg extended release   
oral tablet  
(1 source)                              beta3-Adrenergic   
Agonist                                 Start:   
  
2                                       take 1 tablet   
by mouth once   
daily                                   mirabegron 25 mg   
oral tablet,   
extended release 25   
mg = 1 tab(s), Oral,   
Daily, # 30 tab(s),   
Refills(s) 4,   
Pharmacy: [address],   
[address], cm, 22   
8:54:00 EDT,   
Height/Length   
Dosing, 175, kg,   
22 9:26:00   
EDT, Weight Dosing   
Start Date: 22   
Status: Ordered  
   
                                                    Misc Medication  
(1 source)                                          Start:   
  
1                                                   Misc Medication See   
Instructions,   
vitamin b-6 daily   
Start Date: 21   
Status: Ordered  
   
                                                    Once-Daily gabapentin   
750 MG Oral Tablet  
(1 source)                                          Start:   
  
4                                       take 1 tablet   
by mouth once   
daily                                   gabapentin 750 mg/24   
hours oral tablet,   
extended release mg   
tab(s), Oral, Daily   
Start Date: 24   
Status: Ordered  
   
                                                    ondansetron 8 mg oral   
tablet  
(2 sources)                             Serotonin-3 Receptor   
Antagonist                              Start:   
  
4                                       take 8 mg by   
mouth once   
daily                                   Ondansetron Hcl   
Active 8 MG PO Daily   
2024   
12:00am  
   
                                                    24 hr oxybutynin   
chloride 10 mg   
extended release oral   
tablet  
(20 sources)                            Cholinergic   
Muscarinic   
Antagonist                              Start:   
  
4                                       take 1 tablet   
by mouth once   
daily                                   oxybutynin 10 mg ER   
Tab 10 mg = 1   
tab(s), Oral, Daily,   
# 30 tab(s),   
Refills(s) 11,   
Pharmacy: [facility]   
#[id number], 190, cm,   
23 13:27:00   
EDT, Height/Length   
Dosing, 165.9, kg,   
23 13:27:00   
EDT, Weight Dosing   
Start Date: 24   
Status: Ordered  
  
  
  
                                        Start: 2023   take 1 tablet by james  
th at   
bedtime                                 oxybutynin 5 mg Tab 5 mg = 1 tab(s),   
Oral, Bedtime, # 30 tab(s),   
Refills(s) 11, Pharmacy: [facility]   
#[id number], 190, cm, 23 13:27:00   
EDT, Height/Length Dosing, 165.9,   
kg, 23 13:27:00 EDT, Weight   
Dosing Start Date: 23 Status:   
Ordered  
   
                                        Start: 2023   take 5 mg by mouth o  
nce daily   
at bedtime                              Oxybutynin Chloride Active 5 MG PO   
Daily at bedtime 2023   
12:00am  
   
                                        Start: 2023   take 15 mg by mouth   
once daily   
at bedtime                              Oxybutynin Chloride Active 15 MG PO   
Daily at bedtime 2023   
12:00am  
   
                                        Start: 10-   take 1 tablet by james  
th once   
daily                                   oxybutynin 15 mg ER Tab 15 mg = 1   
tab(s), Oral, Daily, # 30 tab(s),   
Refills(s) 11, Pharmacy: [facility]   
#[id number], 190, cm, 22 14:48:00   
EDT, Height/Length Dosing, 175, kg,   
22 14:48:00 EDT, Weight Dosing   
Start Date: 10/17/22 Status: Ordered  
   
                                        Start: 2022   take 2 tablets by mo  
uth once   
daily                                   oxybutynin 5 mg ER Tab 5 mg = 1   
tab(s), Oral, Daily, Can increase to   
2 tablets daily, # 30 tab(s),   
Refills(s) 11, Pharmacy: [facility]   
#[id number], 190, cm, 22 14:48:00   
EDT, Height/Length Dosing, 175, kg,   
22 14:48:00 EDT, Weight Dosing   
Start Date: 22 Status: Ordered  
   
                                Start: 2022                 oxybutynin XL   
(DITROPAN XL) 15 mg 24   
hr tablet Take 20 mg by mouth in the   
morning. Takes 15 mg tab + 5 mg tab   
daily. 2022 Active  
   
                                                            take 1 tablet by james  
th every   
twenty-four hours in the   
morning                                 oxybutynin XL (Ditropan-XL) 15 MG 24   
hr tablet Take 15 mg by mouth in the   
morning. 0 Active  
   
                                                            take 1 tablet by james  
th every   
twenty-four hours in the   
morning                                 oxybutynin XL (Ditropan-XL) 5 MG 24   
hr tablet Take 5 mg by mouth in the   
morning. Do not crush, chew, or   
split. . 0 Active  
  
  
  
                                        Comment on above:   Take 15 mg by mouth   
once daily.   
   
                                                    pantoprazole 40 mg   
delayed release oral   
tablet  
(20 sources)                            Proton Pump   
Inhibitor                               Start:   
  
End:   
                                       take 1 tablet by   
mouth in the   
morning                                 pantoprazole   
(PROTONIX) 40 mg EC   
tablet Take 1 tablet   
(40 mg total) by   
mouth in the morning.   
2021 Active  
  
  
  
                                        Start: 2021   take 1 tablet by james  
th once   
daily                                   Pantoprazole 40 mg DR Tab take 1   
tablet by mouth once daily Start   
Date: 21 Status: Ordered  
  
  
  
                                        Comment on above:   take 1 tablet by james  
th once daily for 30   
   
                                                    predniSONE 20 mg oral   
tablet  
(2 sources)                                         Start:   
2024                                          predniSONE (Deltasone)   
20 MG tablet   
Indications: Intertrigo   
Take 2 tablets for 5   
days, then take 1 tablet   
for 5 days by mouth 15   
tablet 0 2024   
Active  
  
  
  
                                Start: 2024                 predniSONE (De  
ltasone) 20 MG tablet Indications: Intertrigo   
Take   
2 tablets for 5 days, then take 1 tablet for 5 days by mouth 15   
tablet 0 2024 Active  
  
  
  
                                                    pregabalin 150 mg oral   
capsule  
(16 sources)                            Start: 10-   take 1 capsule by   
mouth three times   
daily                                   pregabalin (LYRICA) 150   
mg capsule Indications:   
Lumbosacral spondylosis   
without myelopathy Take   
1 capsule (150 mg total)   
by mouth 3 (three) times   
a day. 90 capsule 2   
10/10/2024 Active  
  
  
  
                                                    Start: 08-  
End: 10-                         take 1 capsule by mouth three   
times daily                             pregabalin (LYRICA) 150 mg capsule   
Indications: Lumbosacral spondylosis   
without myelopathy Take 1 capsule   
(150 mg total) by mouth 3 (three)   
times a day. 90 capsule 08/15/2024   
10/07/2024 Discontinued (Reorder)  
   
                                                    Start: 2023  
End: 2024                         take 1 capsule by mouth three   
times daily                             pregabalin (LYRICA) 150 mg capsule   
Indications: Lumbosacral spondylosis   
without myelopathy Take 1 capsule   
(150 mg total) by mouth 3 (three)   
times a day. 90 capsule 0 2024   
Active  
   
                                                    Start: 10-  
End: 2023                                     pregabalin (Lyrica) 150 MG c  
apsule   
Take 150 mg by mouth in the morning   
and 150 mg at noon and 150 mg in the   
evening. 0 10/23/2023 Active  
  
  
  
                                        Comment on above:   Take 1 capsule by mo  
uth three times a day for 30 days.   
   
                                                    propranolol   
hydrochloride 10 mg oral   
tablet  
(11 sources)                            beta-Adrenergic   
Blocker                                 Start:   
10-  
3                                       take 1 tablet by   
mouth in the   
morning                                 propranoloL   
(INDERAL) 10 mg   
tablet Take 1 tablet   
(10 mg total) by   
mouth in the   
morning. 10/31/2023   
Active  
   
                                                    rimegepant 75 mg   
disintegrating oral   
tablet  
(20 sources)                                        Start:   
  
3                                       take 1 tablet by   
mouth once                              Rimegepant (Nurtec   
Odt) 75 mg   
Tablet,Disintegratin  
g Active 75 MG PO   
Once 2023 12:00am as a   
single dose  
  
  
  
                                Start: 01-                 NURTEC ODT 75   
mg tablet,disintegrating Dissolve 75 mg on   
tongue   
as needed. 01/10/2023 Active  
  
  
  
                                        Comment on above:   take 1 tablet by james  
 AT ONSET OF MIGRAINE FOR 30 DAYS   
   
                                                    rizatriptan 10 mg   
oral tablet  
(17 sources)                            Serotonin-1b and   
Serotonin-1d   
Receptor Agonist                        Start:   
2023                              take 1 tablet by   
mouth once                              Rizatriptan (Maxalt)   
10 mg Tablet Active   
10 MG PO Once   
2023   
12:00am as a single   
dose  
  
  
  
                                Start: 2022 take 10 mg by mouth once daily  
 rizatriptan 10 mg, Oral, Daily   
Start   
Date: 3/23/22 Status: Ordered  
  
  
  
                                                    sucralfate 1000 mg   
oral tablet  
(5 sources)                             Aluminum   
Complex                                 Start: 2024  
End: 2024                         take 1 tablet   
by mouth every   
six hours                               Sucralfate   
(Carafate) 1 gram   
tablet Active 1 GM   
PO Q6H 2024 12:00am  
   
                                                    SUMAtriptan 50 mg   
oral tablet  
(7 sources)                             Serotonin-1b   
and   
Serotonin-1d   
Receptor   
Agonist                   Start: 2022         take 1 tablet   
by mouth once                           SUMAtriptan 50 mg   
Tab 50 mg = 1   
tab(s), Oral, Once   
Start Date:   
3/23/22 Status:   
Ordered  
   
                                                    tamsulosin   
hydrochloride 0.4 mg   
oral capsule  
(20 sources)                            alpha-Adrenergi  
c Blocker                 Start: 2023         take 1 capsule   
by mouth every   
twenty-four   
hours in the   
morning                                 tamsulosin   
(Flomax) 0.4 MG 24   
hr capsule Take   
0.4 mg by mouth in   
the morning. 0   
2023 Active  
  
  
  
                                        Start: 2022   take 1 capsule by mo  
uth once   
daily                                   tamsulosin (FLOMAX) 0.4 mg capsule   
Take 1 capsule (0.4 mg total) by   
mouth nightly. 2022 Active  
  
  
  
                                        Comment on above:   Take 0.4 mg by mouth  
 once daily.   
   
                                                    Testosterone   
Cypionate 200 mg/mL   
intramuscular   
solution  
(2 sources)                                         Start:   
11-10-2  
022                                     inject 200 mg by   
intramuscular   
injection every   
other week                              Testosterone   
Cypionate 200 mg/mL   
intramuscular   
solution 200 mg = 1   
mL, IntraMuscular,   
q2wk, Refills(s) 0   
Start Date: 11/10/22   
Status: Ordered  
   
                                                    tiZANidine 4 mg oral   
tablet  
(20 sources)                            Central alpha-2   
Adrenergic   
Agonist                                 Start:   
10-10-2  
024                                     take 1 tablet by   
mouth every eight   
hours as needed                         tiZANidine   
(ZANAFLEX) 4 mg   
tablet Take 1 tablet   
(4 mg total) by   
mouth every 8   
(eight) hours as   
needed for muscle   
spasms. 90 tablet 1   
10/10/2024 Active  
  
  
  
                                                    Start: 08-  
End: 10-                         take 1 tablet by mouth every   
eight hours as needed                   tiZANidine (ZANAFLEX) 4 mg tablet   
Take 1 tablet (4 mg total) by mouth   
every 8 (eight) hours as needed for   
muscle spasms. 120 tablet 1   
08/15/2024 10/07/2024 Discontinued   
(Reorder)  
   
                                        Start: 2023   take 4 mg by mouth f  
our times   
daily                                   Tizanidine Active 4 MG PO Four times   
daily 2023 12:00am  
   
                                Start: 2023                 tiZANidine 4 m  
g Tab Refills(s) 0   
Start Date: 23 Status: Ordered  
  
  
  
                                        Comment on above:   take 1 tablet by james  
th every 8 hours if needed for muscle   
spasm   
   
                                                    topiramate 200 mg   
oral tablet  
(20 sources)                                        Start:   
2024                              take 1 mg by   
mouth twice   
daily                                   topiramate 200 mg   
Tab mg tab(s), Oral,   
BID Start Date:   
24 Status:   
Ordered  
  
  
  
                                        Start: 2022   take 1 tablet by james  
th in the   
morning, then take 1 tablet by   
mouth at bedtime                        topiramate (TOPAMAX) 100 mg tablet   
Take 1 tablet (100 mg total) by   
mouth in the morning and 1 tablet   
(100 mg total) before bedtime.   
2022 Active  
  
  
  
                                        Comment on above:   Take 100 mg by mouth  
 twice daily.   
   
                                                    traMADol hydrochloride   
50 mg oral tablet  
(1 source)                Opioid Agonist            Start:   
2021                              take 1 tablet   
by mouth every   
eight hours                             tramadol 50 mg   
oral tablet take 1   
tablet by mouth   
every 8 hours if   
needed Start Date:   
21 Status:   
Ordered  
   
                                                    traZODone hydrochloride   
100 mg oral tablet  
(6 sources)                             Serotonin Reuptake   
Inhibitor                               Start:   
2023                              take 1 tablet   
by mouth once   
daily                                   traZODone   
(DESYREL) 100 mg   
tablet Take 1   
tablet (100 mg   
total) by mouth   
nightly.   
2023 Active  
  
  
  
Completed/Discontinued Medications  
  
  
  
                      Medication Drug Class(es) Dates      Sig (Normalized) Sig   
(Original)  
   
                                                    cephalexin 500 mg   
oral capsule  
(2 sources)                             Cephalosporin   
Antibacterial                           Start:   
2022                              take 1 capsule by   
mouth once daily                        Keflex 500 mg Cap   
500 mg = 1 cap(s),   
Oral, q12hr, take   
1 cap the evening   
prior to scheduled   
procedure, take   
2nd capsule the   
day of scheduled   
procedure, # 2   
cap(s), Refills(s)   
0, Pharmacy: [address], [address], cm,   
22 9:26:00   
EDT, Height/Length   
Dosing, 17...   
Start Date:   
22 Status:   
Ordered  
   
                                                    gabapentin 800 mg   
oral tablet  
(20 sources)              Anti-epileptic Agent      Start:   
2023  
End:   
2024                              take 800 mg by   
mouth twice daily                       Gabapentin   
Discontinued 800   
MG PO Twice daily   
2023 12:00am   
2024   
10:44am  
  
  
  
                                        Start: 2021   take 1 capsule by mo  
uth three   
times daily                             gabapentin 400 mg Cap take 1   
capsule by mouth three times a day   
Start Date: 21 Status: Ordered  
   
                                                    Start: 2021  
End: 10-                                     gabapentin 600 mg, Oral Star  
t Date:   
3/23/22 Status: Ordered  
  
  
  
                                        Comment on above:   take 1 tablet by james  
th three times a day for 30 DAYS for 30   
   
                                                    QUEtiapine 100 mg   
oral tablet  
(20 sources)              Atypical Antipsychotic    Start:   
  
3  
End:   
  
4                                       take 150 mg by   
mouth once   
daily at   
bedtime                                 Quetiapine   
Discontinued 150 MG   
PO Daily at bedtime   
2023   
12:00am 2024 10:45am  
  
  
  
                                Start: 2023                 QUEtiapine (SE  
ROQUEL) 100 mg tablet   
Take 150 mg by mouth daily at   
bedtime. 0 2023 Active  
   
                                Start: 2022                 quetiapine 200  
 mg, Oral Start Date:   
3/23/22 Status: Ordered  
   
                                                            take 1 tablet by james  
th once   
daily                                   QUEtiapine 150 mg tablet Take 150   
mg by mouth nightly. Active  
  
  
  
                                        Comment on above:   Take 150 mg by mouth  
 daily at bedtime.   
  
  
  
Problems  
Active Problems  
  
  
                      Problem Classification Problem    Date       Documented Da  
te Episodic/Chronic  
   
                                                    Abdominal pain  
(4 sources)                             Abdominal pain;   
Translations:   
[Unspecified abdominal   
pain]                                   Onset:   
  
4                         2024                Episodic  
   
                                                    Anxiety disorders  
(20 sources)                            Anxiety; Translations:   
[Anxiety disorder,   
unspecified]                            Onset:   
  
8                         2022                Chronic  
   
                                                    Esophageal disorders  
(20 sources)                            Gastroesophageal   
reflux disease;   
Translations:   
[Gastro-esophageal   
reflux disease without   
esophagitis]                            Onset:   
  
4                         2022                Chronic  
   
                                                    Genitourinary symptoms   
and ill-defined   
conditions  
(20 sources)                            Unspecified urinary   
incontinence;   
Translations:   
[Post-void dribbling]                   Onset:   
  
2                                                   Chronic  
   
                                                    Genitourinary symptoms   
and ill-defined   
conditions  
(20 sources)                            Disorder of the   
urinary system;   
Translations: [Other   
specified disorders of   
urinary system]                         Onset:   
  
2                                                   Episodic  
   
                                                    Headache; including   
migraine  
(7 sources)                             Migraine without aura,   
intractable, without   
status migrainosus;   
Translations:   
[Migraine without   
aura, not refractory ]                  Onset:   
  
2                                                   Chronic  
   
                                                    Mood disorders  
(20 sources)                            Depressive disorder;   
Translations: [Bipolar   
I disorder]                             Onset:   
  
3                         2022                Chronic  
   
                                                    Mycoses  
(2 sources)                             Dermatophytosis;   
Translations:   
[Dermatophytosis,   
unspecified]                            2024          Episodic  
   
                                                    Nutritional   
deficiencies  
(3 sources)                             Vitamin D deficiency;   
Translations: [Vitamin   
D deficiency,   
unspecified]                            Onset:   
  
3                         2023                Chronic  
   
                                                    Other endocrine   
disorders  
(4 sources)                             Testicular   
hypofunction;   
Translations:   
[Testicular   
hypofunction]                           Onset:   
11-  
2                                                   Chronic  
   
                                                    Other endocrine   
disorders  
(7 sources)     Male hypogonadism                 11-      Chronic  
   
                                                    Other endocrine   
disorders  
(1 source)                              Hypopituitarism;   
Translations:   
[Hypopituitarism]                       Onset:   
  
3                                                   Chronic  
   
                                                    Other endocrine   
disorders  
(5 sources)                             Hypogonadotropic   
hypogonadism                            2023          Chronic  
   
                                                    Other gastrointestinal   
disorders  
(1 source)                              Diarrhea, unspecified;   
Translations:   
[Diarrhea,   
unspecified]                            Onset:   
  
4                                                   Episodic  
   
                                                    Other inflammatory   
condition of skin  
(2 sources)                             Intertrigo;   
Translations:   
[Erythema intertrigo]                     2024          Episodic  
   
                                                    Other nervous system   
disorders  
(1 source)                              Other chronic pain;   
Translations: [Chronic   
midline low back pain   
with right-sided   
sciatica]                               Onset:   
  
3                                                   Chronic  
   
                                                    Other nervous system   
disorders  
(9 sources)                             Chronic pain;   
Translations: [Other   
chronic pain]                           Onset:   
  
1                         12-                Chronic  
   
                                                    Other nervous system   
disorders  
(1 source)                              Numbness of lower limb   
; Translations:   
[Anesthesia of skin]                                         Episodic  
   
                                                    Other nervous system   
disorders  
(4 sources)                             Acute postoperative   
pain; Translations:   
[Other acute   
postprocedural pain]                     2023          Episodic  
   
                                                    Other nutritional;   
endocrine; and   
metabolic disorders  
(4 sources)                             Obesity; Translations:   
[Obesity, unspecified]                  Onset:   
  
2                                                   Chronic  
   
                                                    Other nutritional;   
endocrine; and   
metabolic disorders  
(9 sources)                             Morbid obesity;   
Translations: [Morbid   
(severe) obesity due   
to excess calories]                     Onset:   
  
1                         12-                Chronic  
   
                                                    Other screening for   
suspected conditions   
(not mental disorders   
or infectious disease)  
(6 sources)                             Blood chemistry   
abnormal;   
Translations: [Other   
specified abnormal   
findings of blood   
chemistry]                              Onset:   
  
3                                                   Episodic  
   
                                                    Other skin disorders  
(1 source)                              Hidradenitis   
suppurativa                             2024          Episodic  
   
                                                    Residual codes;   
unclassified  
(13 sources)                            Sleep apnea;   
Translations: [Sleep   
apnea, unspecified]                     Onset:   
  
2                                                   Chronic  
   
                                                    Residual codes;   
unclassified  
(9 sources)                             Obstructive sleep   
apnea syndrome;   
Translations:   
[Obstructive sleep   
apnea (adult)   
(pediatric)]                            Onset:   
  
1                         12-                Chronic  
   
                                                    Residual codes;   
unclassified  
(2 sources)                             H/O Spinal surgery;   
Translations: [Other   
specified   
postprocedural states]                                         Episodic  
   
                                                    Residual codes;   
unclassified  
(1 source)                              Other specified   
postprocedural states;   
Translations: [History   
of back surgery]                        Onset:   
10-  
3                                                   Episodic  
   
                                                    Spondylosis;   
intervertebral disc   
disorders; other back   
problems  
(20 sources)                            Lumbar   
post-laminectomy   
syndrome;   
Translations:   
[Postlaminectomy   
syndrome, lumbar   
region]                                 Onset:   
  
0                                                   Chronic  
   
                                                    Substance-related   
disorders  
(4 sources)                             Cannabis abuse;   
Translations:   
[Cannabis abuse,   
uncomplicated]                          Onset:   
  
1                         07-                Chronic  
   
                                        Comment on above:   Added secondary to d  
ocumentation in Social History.   
   
                                                    Unclassified  
(11 sources)                            Finding of sensation   
of bladder                              2022            
  
  
Past or Other Problems  
  
  
                                                    Problem   
Classification  Problem         Date            Documented Date Episodic/Chronic  
   
                                                    Abdominal hernia  
(3 sources)                             Umbilical hernia;   
Translations:   
[Umbilical hernia   
without obstruction   
or gangrene]                            Onset:   
2023                Episodic  
   
                                                    Malaise and fatigue  
(3 sources)                             Fatigue;   
Translations: [Other   
fatigue]                                Onset:   
2023                Episodic  
   
                                                    Other endocrine   
disorders  
(3 sources)                             Hypotestosteronism;   
Translations:   
[Endocrine disorder,   
unspecified]                            Onset:   
2023                Episodic  
   
                                                    Other nervous system   
disorders  
(1 source)                              Anesthesia of skin;   
Translations: [Right   
leg numbness]                           Onset:   
2023                                          Episodic  
   
                                                    Other nervous system   
disorders  
(3 sources)                             Abnormal gait;   
Translations:   
[Unspecified   
abnormalities of   
gait and mobility]                      Onset:   
08-                07-                Episodic  
   
                                                    Other non-traumatic   
joint disorders  
(9 sources)                             Pain in unspecified   
knee; Translations:   
[Pain in joint,   
lower leg]                              Onset:   
01-                12-                Episodic  
   
                                                    Other upper   
respiratory   
infections  
(3 sources)                             Acute frontal   
sinusitis;   
Translations: [Acute   
frontal sinusitis,   
unspecified]                            Onset:   
2023                Episodic  
   
                                                    Residual codes;   
unclassified  
(9 sources)                             Insomnia;   
Translations:   
[Insomnia,   
unspecified]                            Onset:   
12-                12-                Episodic  
   
                                                    Spondylosis;   
intervertebral disc   
disorders; other back   
problems  
(20 sources)                            Low back pain;   
Translations:   
[Lumbago]                               Onset:   
2020                                          Episodic  
  
  
  
Results  
  
  
                          Test Name    Value        Interpretation Reference   
Range                                   Facility  
   
                                                    Provider Orderson 2024  
   
   
                                        Provider Orders     [phone]  
[id number]  
#[id number]        [facility]  
   
                                                    Amphetamine Screen Ql (U)Ord  
ered By: ABIGAIL MCCLENDON on 2024   
   
                      Amphetamines Ql (U) Negative              Negative   [facility]  
   
                                                    Barbiturates [Presence] in U  
rine by Screen methodOrdered By: ABIGAIL MCCLENDON on   
2024   
   
                                                    Barbiturates Screen Ql   
(U)             Negative                        Negative        [facility]  
   
                                                    Benzodiazepines Screen Ql (U  
)Ordered By: ABIGAIL MCCLENDON on 2024   
   
                      Benzodiazepines Ql (U) Negative              Negative   [facility]  
   
                                                    Benzoylecgonine [Presence] i  
n Urine by Screen methodOrdered By: ABIGAIL MCCLENDON on  
   
2024   
   
                                                    Benzoylecgonine Screen   
Ql (U)          Negative                        Negative        [facility]  
   
                                                    Cannabinoids [Presence] in U  
rine by Screen methodOrdered By: ABIGAIL MCCLENDON on   
2024   
   
                                                    Cannabinoids Screen Ql   
(U)             Positive        High            Negative        [facility]  
   
                                        Comment on above:   These are unconfirme  
d results and should not be used for legal  
   
purposes. Drug Cut-Off Concentration: AMPH 1000 ng/mL ABEBE 200   
ng/mL SHAWN 200 ng/mL COCM 300 ng/mL  ng/mL PCP 25 ng/mL   
THC 20 ng/mL   
   
                                                    Drug Screen,Urineon 20  
24   
   
                                                    Amphetamine   
Screen,Urine    Negative        Normal          Negative        [facility]   
[facility]   
Group  
   
                                        Comment on above:   Performed By: #### U  
RDS ####  
[address]   
   
                                                    Barbiturate   
Screen,Urine    Negative        Normal          Negative        [facility]   
[facility]   
Group  
   
                                        Comment on above:   Performed By: #### U  
RDS ####  
[place] [address] [place]   
   
                                                    Benzodiazepines   
Screen,Urine    Negative        Normal          Negative        [facility]   
Phys[facility]   
Group  
   
                                        Comment on above:   Performed By: #### U  
RDS ####  
[address]   
   
                                                    Cannabinoid   
Screen,Urine    Positive        High            Negative        [facility]   
Phys[facility]   
Group  
   
                                        Comment on above:   Result Comment: Thes  
e are unconfirmed results and should not   
be used for  
legal purposes.  
Drug Cut-Off Concentration:  
AMPH 1000 ng/mL  
ABEBE 200 ng/mL  
SHAWN 200 ng/mL  
COCM 300 ng/mL  
 ng/mL  
PCP 25 ng/mL  
THC 20 ng/mL  
PERFORMED BY:  
[place] [address]  
[phone]  
PATHOLOGIST MEDICAL DIRECTOR  
KLAUS KEARNS M.D.   
   
                                                            Performed By: #### U  
RDS ####  
[place] [address] [place]   
   
                      Cocaine Screen,Urine Negative   Normal     Negative   [facility]   
[facility]   
[facility]  
   
                                        Comment on above:   Performed By: #### U  
RDS ####  
[place] [address] [place]   
   
                      Opiate Screen,Urine Negative   Normal     Negative   [facility]   
Physician   
Group  
   
                                        Comment on above:   Performed By: #### U  
RDS ####  
[place] [address] [place]   
   
                                                    Phencyclidine   
Screen,Urine    Negative        Normal          Negative        [facility]   
Phys[facility]   
Group  
   
                                        Comment on above:   Performed By: #### U  
RDS ####  
[facility]  
[address]   
   
                                                    Adonis 2024   
   
                                        L                   Specimen: [id number]   
Received:   
   
Status: KRYSTIN Jensen   
Num: [id number]  
Spec Type: Surgical   
Subm Dr: Matthew Agosto MD  
  
Tissues: A Appendix   
- Other than   
Incidental (APPND)  
Procedures: HE/2,   
Gross/Micro L3  
  
  
Age/  
Patient Birth Sex   
Location Account   
Attending Physician  
  
  
Michi Tolentino   
28/M SC [id number]   
Matthew Agosto MD  
  
  
  
SPEC NUM: [id number]   
RECD:    
STATUS: KRYSTIN JENSEN   
NUM: [id number]  
MISAEL:    
DR: Matthew Agosto MD  
  
ENTERED:   
 [facility]   
DR:  
  
SPEC TYPE: Surgical   
DEPT: S  
ENTERED BY:   
[id number] RECV BY:   
[id number]  
  
ORDERED: HE/2,   
Gross/Micro L3  
ORDERED: HE/2,   
Gross/Micro L3  
  
Pathological   
Diagnosis  
  
Appendix,   
appendectomy:   
Negative for Acute   
appendicitis.  
  
  
Clinical Information  
  
Abdominal pain,   
nausea and and   
diarrhea  
  
Gross Description  
  
The specimen was   
received in formalin   
with the patient's   
name and  appendix    
is a tan-gray  
vermiform appendix   
measuring 5.0 cm in   
length, and a   
diameter of 0.7 cm.   
There is a staple  
line across the   
proximal margin. The   
proximal margin is   
inked black. The   
serosal surface  
is tan-gray,   
vascular with   
attached   
mesoappendix   
measuring 5.5 x 3.5   
x 0.6 cm. Serially  
sectioned revealing   
a lumen measuring   
0.2 cm in greatest   
dimension.   
Representative   
sections  
are submitted in   
cassettes A1-A2.  
  
A1 proximal margin   
and cross-sections  
A2 distal tip   
bisected  
  
CPT Codes  
  
49231  
--------------------  
------------  
  
  
--------------------  
------------  
Specimen: [id number]   
Received:   
   
Status: KRYSTIN Jensen   
Num: [id number]  
Spec Type: Surgical   
Subm Dr: Matthew Agosto MD  
  
Tissues: A Appendix   
- Other than   
Incidental (APPND)  
Procedures: HE/2,   
Gross/Micro L3  
--------------------  
------------  
Patient:   
Michi Tolentino   
[id number]   
(Continued)  
--------------------  
------------  
  
  
Signed   
__________(signature   
on file)___________   
Lara Neumann MD   
24 1624       Normal                                  [facility]   
Physician   
Group  
   
                                                    Opiates [Presence] in Urine   
by Screen methodOrdered By: ABIGAIL MCCLENDON on   
2024   
   
                      Opiates Screen Ql (U) Negative              Negative   [facility]  
   
                                                    Phencyclidine Screen Ql (U)O  
rdered By: ABIGAIL MCCLENDON on 2024   
   
                      Phencyclidine Ql (U) Negative              Negative   [facility]  
   
                                                    Campy coli+jejuni BD MaxOrde  
red By: Matthew Agosto on 2024   
   
                                                    C. coli+jejuni tuf gene   
JONATHAN+probe Ql (Stl) Negative                        Negative        [facility]  
   
                                        Comment on above:   Campylobacter test i  
ncludes C. jejuni and C. coli.   
   
                                                    Escherichia coli Stx1 and St  
x2 toxin stx1+stx2 genes [Presence] in Stool by JONATHAN   
withOrdered By: Matthew Agosto on 2024   
   
                                                    E. coli stx1+stx2 genes   
JONATHAN+probe Ql (Stl) Negative                        Negative        [facility]  
   
                                                    Salmonella sp spaO gene [Pre  
sence] in Stool by JONATHAN with probe detectionOrdered   
By:   
Matthew Agosto on 2024   
   
                                                    Salmonella sp spaO gene   
JONATHAN+probe Ql (Stl) Negative                        Negative        [facility]  
   
                                        Comment on above:   Testing performed by  
 RT-PCR   
   
                                                    Shigella species+EIEC invasi  
on plasmid antigen H ipaH gene [Presence] in Stool   
by   
NAAOrdered By: Matthew Agosto on 2024   
   
                                                    Shigella species+EIEC   
invasion plasmid   
antigen H ipaH gene   
JONATHAN+probe Ql (Stl) Negative                        Negative        [facility]  
   
                                        Comment on above:   Shigella sp. test in  
cludes Shigella species and Enteroinvasive  
   
E. coli (EIEC).   
   
                                                    Stool Bacterial Panelon    
   
                      Campylobacter Negative   Normal     Negative   [facility]   
Physician   
Group  
   
                                        Comment on above:   Result Comment: Camp  
ylobacter test includes C. jejuni and C.   
coli.   
   
                                                            Performed By: #### E  
NT BACT PANEL ####  
[address]   
   
                      Salmonella Species Negative   Normal     Negative   [facility]   
Physician   
Group  
   
                                        Comment on above:   Result Comment: Test  
ing performed by RT-PCR  
PERFORMED BY:  
[place] [address]  
[phone]  
PATHOLOGIST MEDICAL DIRECTOR  
KLAUS KEARNS M.D.   
   
                                                            Performed By: #### E  
NT BACT PANEL ####  
[address]   
   
                                                    Shiga Toxin (E coli   
O157+oth)       Negative        Normal          Negative        [facility]   
Physician   
Group  
   
                                        Comment on above:   Performed By: #### E  
NT BACT PANEL ####  
[address]   
   
                      Shigella Species Negative   Normal     Negative   [facility]   
Physician   
Group  
   
                                        Comment on above:   Result Comment: Shig  
shi sp. test includes Shigella species   
and  
Enteroinvasive E. coli (EIEC).   
   
                                                            Performed By: #### E  
NT BACT PANEL ####  
[address]   
   
                                                    Alanine aminotransferase [En  
zymatic activity/volume] in Serum or PlasmaOrdered   
By:   
Asad Castanon on 2024   
   
                                                    ALT [Catalytic   
activity/Vol]   21 U/L          Normal          7-52            [facility]  
   
                                        Comment on above:   Performed By: #### B  
MP, LIPASE, HEPATIC, CBC ####  
[address]   
   
                                                    Albumin [Mass/volume] in Ser  
um or Plasma by Bromocresol green (BCG) dye binding   
methoOrdered By: Asad Castanon on 2024   
   
                                                    Albumin BCG dye   
[Mass/Vol]      4.1 g/dL                        3.5-5.7         [facility]  
   
                                                    Alkaline phosphatase [Enzyma  
tic activity/volume] in Serum or PlasmaOrdered By:   
Asad Castanon on 2024   
   
                                                    ALP [Catalytic   
activity/Vol]   51 U/L          Normal                    [facility]  
   
                                        Comment on above:   Performed By: #### B  
MP, LIPASE, HEPATIC, CBC ####  
[address]   
   
                                                    Aspartate aminotransferase [  
Enzymatic activity/volume] in Serum or PlasmaOrdered  
By:   
Asad Castanon on 2024   
   
                                                    AST [Catalytic   
activity/Vol]   17 U/L          Normal          13-39           [facility]  
   
                                        Comment on above:   Performed By: #### B  
MP, LIPASE, HEPATIC, CBC ####  
[address]   
   
                                                    Automated basophil %Ordered   
By: Asad Castanon on 2024   
   
                      Basophils/100 WBC (Bld) 0.8 %      Normal     .          [facility]  
   
                                        Comment on above:   Performed By: #### B  
MP, LIPASE, HEPATIC, CBC ####  
[address]   
   
                                                    Automated basophil countOrde  
red By: Asad Castanon on 2024   
   
                      Basophils (Bld) [#/Vol] 0.1 10*3/uL Normal     0.0-0.2      
[facility]  
   
                                        Comment on above:   Result Comment: PERF  
ORMED BY:  
[place] [address]  
[phone]  
PATHOLOGIST MEDICAL DIRECTOR  
KLAUS KEARNS M.D.   
   
                                                            Performed By: #### B  
MP, LIPASE, HEPATIC, CBC ####  
[address]   
   
                                                    Automated blood monocyte cou  
ntOrdered By: Asad Castanon on 2024   
   
                      Monocytes (Bld) [#/Vol] 0.6 10*3/uL Normal     0.0-0.8      
[facility]  
   
                                        Comment on above:   Performed By: #### B  
MP, LIPASE, HEPATIC, CBC ####  
[address]   
   
                                                    Automated eosinophil %Ordere  
d By: Asad Castanon on 2024   
   
                                                    Eosinophils/100 WBC   
(Bld)           3.9 %           Normal          .               [facility]  
   
                                        Comment on above:   Performed By: #### B  
MP, LIPASE, HEPATIC, CBC ####  
[address]   
   
                                                    Automated eosinophil countOr  
dered By: Asad Castanon on 2024   
   
                                                    Eosinophils (Bld)   
[#/Vol]         0.3 10*3/uL     Normal          0.0-0.45        [facility]  
   
                                        Comment on above:   Performed By: #### B  
MP, LIPASE, HEPATIC, CBC ####  
[address]   
   
                                                    Automated monocyte %Ordered   
By: Asad Castanon on 2024   
   
                      Monocytes/100 WBC (Bld) 7.4 %      Normal     .          [facility]  
   
                                        Comment on above:   Performed By: #### B  
MP, LIPASE, HEPATIC, CBC ####  
[address]   
   
                                                    Automated neutrophil %Ordere  
d By: Asad Castanon on 2024   
   
                                                    Neutrophils/100 WBC   
(Bld)           59.9 %          Normal          .               [facility]  
   
                                        Comment on above:   Performed By: #### B  
MP, LIPASE, HEPATIC, CBC ####  
[facility]  
[address] [place] [address]   
   
                                                    Basic Metabolic Panelon 08-2  
3-2024   
   
                                                    Creatinine Clr Calc   
Pharmacy        151.19          Normal                          [facility]   
Physician   
Group  
   
                                        Comment on above:   Performed By: #### B  
MP, LIPASE, HEPATIC, CBC ####  
[address]   
   
                                                    GFR/1.73 sq M.predicted   
MDRD (S/P/Bld) [Vol   
rate/Area]      mL/min/{1.73_m2} Normal                          [facility]   
Physician   
Group  
   
                                        Comment on above:   Performed By: #### B  
MP, LIPASE, HEPATIC, CBC ####  
[address]   
   
                                                    Bilirubin Test strip Ql (U)O  
rdered By: Asad Castanon on 2024   
   
                      Bilirubin Ql (U) Negative              Negative   [facility]  
   
                                                    Bilirubin.direct [Mass/volum  
e] in Serum or PlasmaOrdered By: Asad Castanon on   
2024   
   
                                                    Bilirubin.direct   
[Mass/Vol]      0.10 mg/dL                      0.03-0.18       [facility]  
   
                                                    Bilirubin.total [Mass/volume  
] in Serum or PlasmaOrdered By: Asad Castanon on   
2024   
   
                      Bilirubin [Mass/Vol] 0.8 mg/dL  Normal     0.3-1.0    [facility]  
   
                                        Comment on above:   Performed By: #### B  
MP, LIPASE, HEPATIC, CBC ####  
[address]   
   
                                                    CT abdomen pelvis w conon    
   
                                        CT abdomen pelvis w con [facility] [place] [address]  
  
CT Scan Report  
Signed  
  
Patient:   
Michi Tolentino   
MR#: [id number]  
[id number]  
: 1995   
Acct:[id number]  
  
Age/Sex: 28 / M ADM   
Date: 24  
  
Loc: ER Room: Type:   
[facility] ER  
Attending Dr:  
Copies to: Asad Castanon DO  
  
  
  
Ordering Provider:   
Aasd Castanon DO  
Date of Service:   
24  
Accession #:   
([id number]) CT/CT   
abdomen pelvis w   
con: Abdominal Pain  
  
  
  
  
CT ABDOMEN AND   
PELVIS WITH   
INTRAVENOUS   
CONTRAST:  
  
CLINICAL HISTORY:   
Right lower quadrant   
pain for one week   
with nausea.  
  
COMPARISON: None  
  
TECHNIQUE: Spiral   
images were obtained   
through the abdomen   
and pelvis following   
the administration  
of intravenous   
contrast. This CT   
exam was performed   
using one or more   
following dose   
reduction  
techniques:   
Automated exposure   
control, adjustment   
of the mA and/or kV   
according to patient   
size, or  
use of iterative   
reconstruction   
technique.  
  
FINDINGS:  
  
Lung Bases: [No   
acute findings.]  
  
Organs:Liver portal   
vein gallbladder   
pancreas spleen and   
adrenal glands all   
appear unremarkable.   
No  
enhancing renal mass   
or hydronephrosis.   
Abdominal aorta   
appears normal in   
caliber.[  
  
GI: Stomach is   
grossly   
unremarkable. Small   
bowel appears   
nondilated. Appendix   
is normal. No acute  
colonic abnormality   
is seen.[  
  
Pelvis:[Urinary   
bladder and prostate   
gland appear   
unremarkable.]  
  
Peritoneum/Retroperi  
toneum:No free air,   
free fluid or   
lymphadenopathy.[  
  
Abd   
wall/Bones:Abdominal   
wall demonstrate no   
acute findings.   
Osseous structures   
demonstrate no  
acute findings.[  
  
  
  
ORDER #: [id number]   
CT/CT abdomen pelvis   
w con  
IMPRESSION:  
  
No acute process.  
  
  
  
Impression dictated   
by: Fabio Durham Jr., LORRAINEORico2024   
11:54 AM  
  
  
Dictation Location:   
[place] [address]  
  
  
  
Transcribed By: [facility]   
24 1154  
Dictated By: Fabio Durham Jr, DO   
24 1152  
  
Signed By:  
24 1154       Normal                                  [facility]   
Physician   
Group  
   
                                                    Calcium [Mass/volume] in Ser  
um or PlasmaOrdered By: Asad Castanon on 2024   
   
                      Calcium [Mass/Vol] 9.2 mg/dL  Normal     8.6-10.3   [facility]  
   
                                        Comment on above:   Performed By: #### B  
MP, LIPASE, HEPATIC, CBC ####  
[facility]  
[address] [id number] [place]   
   
                                                    Carbon dioxide, total [Moles  
/volume] in Serum or PlasmaOrdered By: Asad Castanon   
on   
2024   
   
                      CO2 [Moles/Vol] 26.7 mmol/L Normal     21.0-31.0  [facility]  
   
                                        Comment on above:   Performed By: #### B  
MP, LIPASE, HEPATIC, CBC ####  
[facility]  
[address] [id number] [place]   
   
                                                    Chloride [Moles/volume] in S  
nitin or PlasmaOrdered By: Asad Castanon on 2024   
   
                      Chloride [Moles/Vol] 109 mmol/L High            [facility]  
   
                                        Comment on above:   Performed By: #### B  
MP, LIPASE, HEPATIC, CBC ####  
[address]   
   
                                                    Color of Urine by AutoOrdere  
d By: Asad Castanon on 2024   
   
                      Color (U)  Colorless  Normal     Yellow     [facility]  
   
                                        Comment on above:   Order Comment: Name   
Collection Type:: Clean-Voided Midstream   
   
                                                            Performed By: #### U  
A ####  
[address]   
   
                                                    Complete Blood Count Auto Di  
ffon 2024   
   
                                                    Mean Corpuscular HGB   
Conc            33.6 g/dL       Normal          32.5-35.6       [facility]   
Physician   
Group  
   
                                        Comment on above:   Performed By: #### B  
MP, LIPASE, HEPATIC, CBC ####  
[address]   
   
                      Monocytes/100 WBC (Bld) 16.18 %    Normal     0.00-20.00 T  
[facility]   
Physician   
Group  
   
                                        Comment on above:   Performed By: #### B  
MP, LIPASE, HEPATIC, CBC ####  
[address]   
   
                      NRBC%      0.1 /100{WBC} Normal     0-0.5      [facility]   
Physician   
Group  
   
                                        Comment on above:   Performed By: #### B  
MP, LIPASE, HEPATIC, CBC ####  
[address]   
   
                                                    Creatinine [Mass/volume] in   
Serum or PlasmaOrdered By: Asad Castanon on 2024  
   
   
                      Creatinine [Mass/Vol] 1.19 mg/dL Normal     0.70-1.30  [facility]  
   
                                        Comment on above:   Performed By: #### B  
MP, LIPASE, HEPATIC, CBC ####  
[address]   
   
                                                    Erythrocyte distribution wid  
th [Ratio] by Automated countOrdered By: Asad Castanon  
 on   
2024   
   
                                                    Erythrocyte   
distribution width   
(RBC) [Ratio]   13.5 %          Normal          12.0-14.8       [facility]  
   
                                        Comment on above:   Performed By: #### B  
MP, LIPASE, HEPATIC, CBC ####  
[facility]  
[address]   
   
                                                    Erythrocytes [#/volume] in B  
lood by Automated countOrdered By: Asad Castanon on   
2024   
   
                      RBC (Bld) [#/Vol] 5.10 10*6/uL Normal     3.90-5.60  [facility]  
   
                                        Comment on above:   Performed By: #### B  
MP, LIPASE, HEPATIC, CBC ####  
[facility]  
[address]   
   
                                                    Glucose [Mass/volume] in Ser  
um or PlasmaOrdered By: Asad Castanon on 2024   
   
                      Glucose [Mass/Vol] 100 mg/dL  Normal          [facility]  
   
                                        Comment on above:   ADA recommended refe  
rence rangeRandom Glucose Reference Range   
is dependent on time and content of last meal. Glucose of more   
than 200 mg/dL in a nonstressed, ambulatory subject supports   
the diagnosis of Diabetes Mellitus.   
   
                                                            Result Comment: Rand  
om Glucose Reference Range is dependent on   
time and  
content of last meal. Glucose of more than 200 mg/dL in a  
nonstressed, ambulatory subject supports the diagnosis of  
Diabetes Mellitus.  
ADA recommended reference range   
   
                                                            Performed By: #### B  
MP, LIPASE, HEPATIC, CBC ####  
[facility]  
[address]   
   
                                                    Glucose [Mass/volume] in Uri  
ne by Test stripOrdered By: Asad Castanon on   
2024   
   
                                                    Glucose Test strip (U)   
[Mass/Vol]      Normal mg/dL                    Normal          [facility]  
   
                                                    Hematocrit [Volume Fraction]  
 of Blood by Automated countOrdered By: Asad Castanon   
on   
2024   
   
                                                    Hematocrit (Bld)   
[Volume fraction] 46.0 %          Normal          38.8-50.0       [facility]  
   
                                        Comment on above:   Performed By: #### B  
MP, LIPASE, HEPATIC, CBC ####  
[facility]  
[address]   
   
                                                    Hemoglobin Test strip Ql (U)  
Ordered By: Asad Catsanon on 2024   
   
                      Hemoglobin Ql (U) Negative              Negative   [facility]  
   
                                                    Hemoglobin [Mass/volume] in   
BloodOrdered By: Asad Castanon on 2024   
   
                                                    Hemoglobin (Bld)   
[Mass/Vol]      15.4 g/dL       Normal          13.0-17.0       [facility]  
   
                                        Comment on above:   Performed By: #### B  
MP, LIPASE, HEPATIC, CBC ####  
[facility]  
[address]   
   
                                                    Hepatic Panelon 2024   
   
                      Albumin [Mass/Vol] 4.1 g/dL   Normal     3.5-5.7    [facility]   
Physician   
Group  
   
                                        Comment on above:   Performed By: #### B  
MP, LIPASE, HEPATIC, CBC ####  
[facility]  
[address]   
   
                      Bilirubin,Indirect 0.7 mg/dL  Normal                [facility]   
Physician   
Group  
   
                                        Comment on above:   Performed By: #### B  
MP, LIPASE, HEPATIC, CBC ####  
[facility]  
[address]   
   
                                                    Bilirubin.indirect   
[Mass/Vol]      0.10 mg/dL      Normal          0.03-0.18       [facility]   
Physician   
Group  
   
                                        Comment on above:   Performed By: #### B  
MP, LIPASE, HEPATIC, CBC ####  
[address]   
   
                                                    Ketones [Presence] in Urine   
by Test stripOrdered By: Asad Castanon on 2024   
   
                      Ketones Ql (U) Negative   Normal     Negative   [facility]  
   
                                        Comment on above:   Order Comment: Name   
Collection Type:: Clean-Voided Midstream   
   
                                                            Performed By: #### U  
A ####  
[address]   
   
                                                    Leukocyte esterase [Presence  
] in Urine by Test stripOrdered By: Asad Castanon on   
2024   
   
                                                    Leukocyte esterase Test   
strip Ql (U)    Negative        Normal          Negative        [facility]  
   
                                        Comment on above:   Order Comment: Name   
Collection Type:: Clean-Voided Midstream   
   
                                                            Performed By: #### U  
A ####  
[address]   
   
                                                    Leukocytes [#/volume] correc  
hiram for nucleated erythrocytes in Blood by Automated  
   
counOrdered By: Asad Castanon on 2024   
   
                                                    WBC corrected for nucl   
RBC Auto (Bld) [#/Vol] 8.2 10*3/uL                     4.1-10.5        [facility]  
   
                                                    Leukocytes [#/volume] in Blo  
od by Automated countOrdered By: Asad Castanon on   
2024   
   
                      WBC (Bld) [#/Vol] 8.2 10*3/uL Normal     4.1-10.5   [facility]  
   
                                        Comment on above:   Performed By: #### B  
MP, LIPASE, HEPATIC, CBC ####  
[address]   
   
                                                    Lipase [Enzymatic activity/v  
olume] in Serum or PlasmaOrdered By: Asad Castanon on   
2024   
   
                                                    Lipase [Catalytic   
activity/Vol]   29.0 U/L        Normal          11.0-82.0       [facility]  
   
                                        Comment on above:   Result Comment: PERF  
ORMED BY:  
[place] [address]  
[phone]  
PATHOLOGIST MEDICAL DIRECTOR  
KLAUS KEARNS M.D.   
   
                                                            Performed By: #### B  
MP, LIPASE, HEPATIC, CBC ####  
[place] [address] [place]   
   
                                                    Lymphocytes [#/volume] in Bl  
ood by Automated countOrdered By: Asad Castanon on   
2024   
   
                                                    Lymphocytes (Bld)   
[#/Vol]         2.3 10*3/uL     Normal          1.00-4.8        [facility]  
   
                                        Comment on above:   Performed By: #### B  
MP, LIPASE, HEPATIC, CBC ####  
[facility]  
[address] [place] [address] [place]   
   
                                                    Lymphocytes/100 leukocytes i  
n Blood by Automated countOrdered By: Asad Castanon on  
   
2024   
   
                                                    Lymphocytes/100 WBC   
(Bld)           28.0 %          Normal          .               [facility]  
   
                                        Comment on above:   Performed By: #### B  
MP, LIPASE, HEPATIC, CBC ####  
[place] [address] [place]   
   
                                                    MCH [Entitic mass] by Automa  
hiram countOrdered By: Asad Castanon on 2024   
   
                                                    MCH (RBC) [Entitic   
mass]           30.2 pg         Normal          27.5-35.2       [facility]  
   
                                        Comment on above:   Performed By: #### B  
MP, LIPASE, HEPATIC, CBC ####  
[facility]  
[address]   
   
                                                    MCHC Auto (RBC) [Mass/Vol]Or  
dered By: Asad Castanon on 2024   
   
                      MCHC (RBC) [Mass/Vol] 33.6 g/dL             32.5-35.6  [facility]  
   
                                                    MCV [Entitic volume] by Auto  
mated countOrdered By: Asad Castanon on 2024   
   
                      MCV (RBC) [Entitic vol] 90.0 fL    Normal     83.5-101   F  
[facility]  
   
                                        Comment on above:   Performed By: #### B  
MP, LIPASE, HEPATIC, CBC ####  
[facility]  
[address] [place] [address]   
   
                                                    Monocyte distribution width   
[Entitic volume] in Blood by AutomatedOrdered By:   
Asad Castanon on 2024   
   
                                                    Monocyte distribution   
width Auto (Bld)   
[Entitic vol]   16.18 %                         0.00-20.00      [facility]  
   
                                                    Neutrophils [#/volume] in Bl  
ood by Automated countOrdered By: Asad Castanon on   
2024   
   
                                                    Neutrophils (Bld)   
[#/Vol]         4.9 10*3/uL     Normal          1.8-7.7         [facility]  
   
                                        Comment on above:   Performed By: #### B  
MP, LIPASE, HEPATIC, CBC ####  
[facility]  
[address] [place] [address]   
   
                                                    Nitrite Test strip Ql (U)Ord  
ered By: Asad Castanon on 2024   
   
                      Nitrite Ql (U) Negative              Negative   [facility]  
   
                                                    No Panel InformationOrdered   
By: Asad Castanon on 2024   
   
                      Estimated GFR (CKD-EPI) > 60.0 mL/Min                       
  [facility]  
   
                                                    Pharmacy Creatinine   
Clearance (Chem 151.19                                          [facility]  
   
                                                    Nucleated erythrocytes [Pres  
ence] in Blood by Automated countOrdered By: Asad Castanon   
on 2024   
   
                                                    Nucleated RBC Auto Ql   
(Bld)           0.1 /100{WBC}                   0-0.5           [facility]  
   
                                                    Platelet mean volume [Entiti  
c volume] in Blood by Automated countOrdered By:   
Asad Castanon on 2024   
   
                                                    Platelet mean volume   
(Bld) [Entitic vol] 8.5 fL          Normal          6.6-10.1        [facility]  
   
                                        Comment on above:   Performed By: #### B  
MP, LIPASE, HEPATIC, CBC ####  
[facility]  
[address]   
   
                                                    Platelets [#/volume] in Bloo  
d by Automated countOrdered By: Asad Castanon on   
2024   
   
                      Platelets (Bld) [#/Vol] 211 10*3/uL Normal     150-450      
[facility]  
   
                                        Comment on above:   Performed By: #### B  
MP, LIPASE, HEPATIC, CBC ####  
[address]   
   
                                                    Potassium [Moles/volume] in   
Serum or PlasmaOrdered By: Asad Castanon on 2024  
   
   
                      Potassium [Moles/Vol] 3.8 mmol/L Normal     3.5-5.1    [facility]  
   
                                        Comment on above:   Performed By: #### B  
MP, LIPASE, HEPATIC, CBC ####  
[address]   
   
                                                    Protein Test strip (U) [Mass  
/Vol]Ordered By: Asad Castanon on 2024   
   
                      Protein (U) [Mass/Vol] Negative              Negative   [facility]  
   
                                                    Protein [Mass/volume] in Ser  
um or PlasmaOrdered By: Asad Castanon on 2024   
   
                      Protein [Mass/Vol] 6.6 g/dL   Normal     6.4-8.9    [facility]  
   
                                        Comment on above:   Performed By: #### B  
MP, LIPASE, HEPATIC, CBC ####  
[address]   
   
                                                    Serum globulin measurement b  
y calculation (mass/volume)Ordered By: Asad Castanon   
on   
2024   
   
                      Globulin (S) [Mass/Vol] 2.5 g/dL   Normal                [facility]  
   
                                        Comment on above:   Performed By: #### B  
MP, LIPASE, HEPATIC, CBC ####  
[address]   
   
                                                    Serum or plasma albumin/glob  
ulin mass ratioOrdered By: Asad Castanon on 2024  
   
   
                                                    Albumin/Globulin [Mass   
ratio]          1.6 {ratio}     Normal                          [facility]  
   
                                        Comment on above:   Performed By: #### B  
MP, LIPASE, HEPATIC, CBC ####  
[address]   
   
                                                    Serum or plasma anion gap de  
terminationOrdered By: Asad Castanon on 2024   
   
                      Anion gap [Moles/Vol] 8.1 mmol/L Normal     6.0-15.0   [facility]  
   
                                        Comment on above:   Performed By: #### B  
MP, LIPASE, HEPATIC, CBC ####  
[address]   
   
                                                    Serum or plasma non-glucuron  
idated bilirubin measurement (mass/volume)Ordered   
By:   
Asad Castanon on 2024   
   
                                                    Bilirubin.indirect   
[Mass/Vol]      0.7 mg/dL                                       [facility]  
   
                                                    Sodium [Moles/volume] in Ser  
um or PlasmaOrdered By: Asad Castanon on 2024   
   
                      Sodium [Moles/Vol] 140 mmol/L Normal     136-145    [facility]  
   
                                        Comment on above:   Performed By: #### B  
MP, LIPASE, HEPATIC, CBC ####  
[address]   
   
                                                    Specific gravity Test strip   
(U) [Rel density]Ordered By: Asad Castanon on   
2024   
   
                                                    Specific gravity (U)   
[Rel density]   1.018                           1.001-1.030     [facility]  
   
                                                    Urea nitrogen [Mass/volume]   
in Serum or PlasmaOrdered By: Asad Castanon on   
2024   
   
                                                    Urea nitrogen   
[Mass/Vol]      8 mg/dL         Normal          7-25            [facility]  
   
                                        Comment on above:   Performed By: #### B  
MP, LIPASE, HEPATIC, CBC ####  
[address]   
   
                                                    Urinalysison 2024   
   
                      Bilirubin,Urine Negative   Normal     Negative   [facility]   
Physician   
Group  
   
                                        Comment on above:   Order Comment: Name   
Collection Type:: Clean-Voided Midstream   
   
                                                            Performed By: #### U  
A ####  
[place] [id number] [place]   
   
                      Glucose Ql (U) Normal     Normal     Normal     [facility]   
Physician   
Group  
   
                                        Comment on above:   Order Comment: Name   
Collection Type:: Clean-Voided Midstream   
   
                                                            Performed By: #### U  
A ####  
[address]   
   
                      Nitrite,Urine Negative   Normal     Negative   [facility]   
Physician   
Group  
   
                                        Comment on above:   Order Comment: Name   
Collection Type:: Clean-Voided Midstream   
   
                                                            Performed By: #### U  
A ####  
[address]   
   
                      Occult Blood,Urine Negative   Normal     Negative   [facility]   
Physician   
Group  
   
                                        Comment on above:   Order Comment: Name   
Collection Type:: Clean-Voided Midstream   
   
                                                            Result Comment: PERF  
ORMED BY:  
[place] [address]  
[phone]  
PATHOLOGIST MEDICAL DIRECTOR  
KLAUS KEARNS M.D.   
   
                                                            Performed By: #### U  
A ####  
[address]   
   
                      Protein,Urine Negative   Normal     Negative   [facility]   
Physician   
Group  
   
                                        Comment on above:   Order Comment: Name   
Collection Type:: Clean-Voided Midstream   
   
                                                            Performed By: #### U  
A ####  
[address]   
   
                      Specificy Gravity,Urine 1.018      Normal     1.001-1.030   
[facility]   
Physician   
Group  
   
                                        Comment on above:   Order Comment: Name   
Collection Type:: Clean-Voided Midstream   
   
                                                            Performed By: #### U  
A ####  
[address]   
   
                      Urobilinogen,Urine Normal     Normal     Normal     [facility]   
Physician   
Group  
   
                                        Comment on above:   Order Comment: Name   
Collection Type:: Clean-Voided Midstream   
   
                                                            Performed By: #### U  
A ####  
[address]   
   
                                                    Urine appearanceOrdered By:   
Asad Castanon on 2024   
   
                      Appearance (U) Clear      Normal     Clear      [facility]  
   
                                        Comment on above:   Order Comment: Name   
Collection Type:: Clean-Voided Midstream   
   
                                                            Performed By: #### U  
A ####  
[place] [address] [facility]   
   
                                                    Urobilinogen Test strip (U)   
[Mass/Vol]Ordered By: Asad Kina on 2024   
   
                                                    Urobilinogen (U)   
[Mass/Vol]      Normal mg/dL                    Normal          [facility]  
   
                                                    pH of Urine by Test stripOrd  
ered By: Asad Chauhanzi on 2024   
   
                      pH (U)     7.0 [pH]   Normal     5.0-9.0    [facility]  
   
                                        Comment on above:   Order Comment: Name   
Collection Type:: Clean-Voided Midstream   
   
                                                            Performed By: #### U  
A ####  
[facility]  
[address]   
   
                                                    Coding Summaryon 2024   
   
                                        Coding Summary      HTMLBase 64   
InlgouefZYj7kLq+PGhl  
YWQ+KX3TNZWgL03yoSBa  
kX0oK7LEGMoJIsnzYFMM  
LGrBFtCmxyNeZT9ifTKr  
ZXJu  
IC8+JN9yACCmXlrksZYs  
e1N3wLQ0O53ztt9jUVsz  
bKM1STElMvVcpemol6zu  
mBi5LYpsYbonGiEr  
HKUujW85ONO6dZ30Zx44  
wEAjjPJrh5ooqXn9NoGe  
QAShHBM7fLuvRAuwq1Sf  
VMGiW16raCYid2K3  
IGNvbGxhcHNlOyBlbXB0  
pD1hKFyarzdni3hulgge  
Lfh3vc20qQFfg0C7fMY2  
W5TvfqJ9IYDpwUAi  
MbmiqFNHzP7srektt7do  
scbwMgIsLRAvISe0PQl0  
XYCuhTojAmKlQG64CMI3  
ANRelrBrN9JlHGDw  
gQsaGyN3e0I4Rh0RJ8ZN  
LwetY2YRLIHAYLvceYZ+  
BK87kz04W8HgTgapWaw6  
HUCgSTW1fCU3tB2t  
VQKbHCnlz0U8eHC4K4Gw  
iqCqse9hp2aaHRBiONdy  
P68tnRCeg4A2ICJsuPY7  
RRPakLtuYtIxyB08  
Oyc+WAFmdUgif3ShZtnv  
h2agv4vvtDl5PomvCLJt  
goOjiGweARE8l9VjXz1a  
RRPwvBM0vUI0qI1t  
XsLvBeL9RKbtK891FaDp  
aUZfFgxeL94oQ0VpeGS+  
EXInUtl2UXLdpMcfOP5y  
X0MtLOWoaugkmWUe  
rIcxJR6nXZAqmukyAZFk  
rZ0aOMOhF4b5NoNxDjJ8  
UDmnS4BeXOVegbngQd19  
wX4dZkKeSuW5HQjn  
K4ZsrlF9WZUelDYyZXcp  
JFJ3W17kc0I2DMDcFUFv  
TCD7wBC4lA7fsEqcbejf  
bGVmdDsgdmVydGlj  
EKyqBXlmR254LOIadGdp  
PkNvZGluZyBEYXRlOiAg  
MDcvMjkvMjAyNDwvdGQ+  
LWRvSQU3rZwnNUKq  
oMQiDRrnWj1xdDjolSkl  
CF0zHHPyckcyTGVskU9n  
GEIwcLAjjZqeLG7qMNCl  
svzpk297QvMbQWE5  
ZRQeoMNhT0IkwU3tJhXv  
XUKiTRGvN5LqhIEbTYif  
X172YMeuDfB3LZVhlbKg  
I8DrPMUvrWhhPcC8  
b5W8Yp9Ts5PptpegD5Oq  
yTGyNrSmBkjlAAo5M5Pc  
PjwvdHI+XX58ZRFeJL46  
FFi2TQO4aOwvJOdc  
JIEkN5VcnI2uZjUzEGPx  
ZGRkOyc+PHRhYmxlIHdp  
ZHRoPScxMDAlJyBzdHls  
HR7rXa1eYFPmLXHn  
aDxkyFMiRcBij1utEAQl  
EHhlHT6ehAccS9ZhqFV1  
FEVgg4z8Ws00N65nK8Es  
dXA+FHQknES7qFO9  
uQ4iKvOgHlU3FJbvR565  
CjMjyKAsMzqcs4mel5pn  
kUj7VaK3BSUtcfQppRgx  
QNW1d5XmWc78F25o  
IHdpZHRoPSIxNSUiIHZh  
bEyesy0brN5aPs9+PGNv  
nCL4dNO0rG0cYoBgKjZ9  
CGbhI057EwVztCTm  
Bjyvv0asg0xthEs8VuHq  
XFYhyeFtfDmzTVV4l7Gc  
Ev02D2XseMquw9SfGdf6  
xj09wKJuy3R0pRI7  
K9AvYQAvtteyuDFrdJvz  
ZX5eXEByypsiZWQllB2x  
SSGaM4c6BhFvYhE6IFxe  
T3UqhjS8GPOjaHIp  
SPNepHKCuW9lsyxus4xv  
okomPgXuOHGcQEu1PSn2  
JEJbnToaIuLkHST6OyN8  
DYU3nNGrwW6ahVgk  
wwbauT3qHxw+RSR2cOEk  
dLMXGT1dYkzxnVQ+PHRk  
HSG5vZxjYTxsCPUjyW3j  
JAIrL1r4YbRwDuH0  
HEhdE8ZkdzD8BSZyiLWg  
YPTovXJCqS6tzrzlk0zt  
hgilWoGvSEHcBDp4ZZs8  
LWFsaWduOiBsZWZ0  
WvF4OAR9cDLszB1toHow  
rbruiT6bZrh+QmlydGgg  
IEJ0ICb1S8BiUaj9CIPy  
jMgrEZ3gsWVjMNrp  
Nu0qbSkvuJjbNV7wJZDg  
ktegb677LvTal5obESAj  
tLKzSEqzSJH8D90uk8U0  
WVZdUCSmDKR7tHI8  
yZ9brNhmcyqtmLNyqEpw  
pqAogAdqNBstTZfzC939  
XCZpbRxiQsHkOZo0O6Wj  
Tlw1WXLeuPwzHA3t  
iGKsFQsqVx0uxOczpFra  
RD5qAXKmkftor407JfPy  
s3gmGWSrfMOmWCicRJG6  
W29za1G6CFImOVYv  
QUQ6kVV4rA3kjZblkffn  
bGVmdDsgdmVydGljYWwt  
FVkoT587XSTtuVddBnXq  
dWj4Z7HbVgp4XRSb  
tAicMC9tbMEkMGhdMl3e  
wIpseZipWH8fTQWeaysq  
d095MdLez8mxEFIptDRa  
BIpyIMI8O46nw6N7  
EVGeEEQeZBB4oQH2vL9c  
bGlnbjogbGVmdDsgdmVy  
dLbtEGigLNscI176JYQq  
cDsnPlBhdGllbnQg  
GIpxLIj0C1FiJpgprUI+  
NF05DUZqVF13lSSbmNHg  
l6shwVy9TcEtPYStEZD7  
tMxsTHqjk0QsMRDy  
T70hcPUoq8Q0FHYxmXpu  
mSLtAcVahQN5rW5wGXcn  
tayde7tbvhdsEalqx5nq  
rm02aE51R30pSOyi  
ZHRoPSIzMCUiIHZhbGln  
qw5ggI5jDp4+PGNvbCB3  
cMG9lZ0bIZDtGbO4ILpt  
W554UuJwkKOpIofe  
u7ukh1skvFy5GfT6QYDi  
cnXzqPqwCKI6y1OqIs56  
L16hXBjqJHWhYAMeYJSt  
DWWyjZihxf1qhF1k  
Ii8+UIRroGC3cBF4rT3c  
PlQrStP3PWycF372JcWq  
wBQrBurdT95fL0HtgOX+  
PTUqWot4QPWzsLlm  
UN8oeFCbOIqeOy6fPDZ7  
KnVfOtKtHAnxH3CgFNIu  
jobjcftllMA2OCAmIBEm  
hY07La0vxYmyHEKv  
zNZLoN8gaaolv0kfsnru  
BwWdLRZgVRb9NPb7RCFg  
mSipUbQmPUA4IzE3THB0  
pTKcmC9wkXrzpxlg  
sW8eX9VvZNFzpblbGt64  
mH5vHoBiFkX2EAmxLwe+  
OD6SYK6JZHILWOAHNM3E  
RVIgREVBTjwvdGQ+  
KHBuBJZ6bLgmRVidYXQv  
aB7oPQGaX2y5QdYcYlJ5  
MGtmW7EdKPNcfpwvWl04  
vE5pNoBfDvX1MQkx  
I0LbhwW1WPUpgNImYWhr  
WOY7E68mh3Z3BGOxJZRj  
FDR6pXF3dF0kjGnpoiwh  
bGVmdDsgdmVydGlj  
OIjoAYsdU998UFItrHvf  
ToS8VzBpViA2UTZ4W8Zt  
Jdq3BZBcnYdeAS7ihFLq  
ATfjAb4naTxjxQnx  
HC2mQWYvwvhxWGIwkQ1x  
LKZkuFJneUdpOO7cSGYe  
nxvqj389VaCpIJA1VTJu  
xFNgT8PheH5jIjHc  
XIVuYLVpJ4WlcXAqBZqp  
A841CPxqCpL0DIFbgfXf  
P2HzEXDbhCpvLaH8r9V5  
Na1fYZKQBUCslxal  
dGQ+DASrLVW4sSujWWol  
GMKjlM5pEKSyW7f2OjXj  
LmC2GGgaK3WoLZPeavhy  
Qj89tA9gXwRyQbG1  
IIqnR3PlzvM2RROtwQYp  
PCzhBVW0J25sx4V0WBZa  
RNDtQTA0oQD0kX3euSkp  
bjogbGVmdDsgdmVy  
bEosPGaxWYpwT089ZXDl  
nLwaBr7QSRZ8L3OxHed0  
AZUyhVrsKH2cnLIaQOys  
Om6ohGfsrSknQR5p  
BOIkivqtGTBgyJ1yIZNa  
iOMcyWjcRZ2fTDQwxkmd  
f949AmHuIMO4MYJmyYUo  
N4FwlA2oBnAiGJDs  
AEDoY9AmlJSdSVwbD111  
LIiqNsR9ADLvxuHyF8Lw  
WXFyaJtpJqT5v9F9Dm4D  
RVH4hlOzwimgO2U2  
lFT0wKQjnOskyWB+PC90  
iy75I6BfYbyoCqj6GKFg  
BFT5pSM4zM3zDNSfDHmf  
j5J1fKL4J6VxybMd  
gn1do4xsMXHfDWssQ27z  
xBDxb6L7ATNaqZB5MKMp  
hMjwEmLjgH39Lef+PGNv  
pTruo7FaIajvu1ss  
r3uloIw9VpMsDXOrmeWm  
tWlzYIX5y7GcZu07T81m  
IHdpZHRoPSIzMCUiIHZh  
wWvzuu4laF7fBl5+  
AJRlfVD8xZD9tM8lDxFu  
SaX4OVcaT888RhAdiBUq  
Cvagh7szx3anoWo5NsUi  
JSIgdmFsaWduPSJ0  
v8QwUf92E1VsaLkfg9Rb  
Yjm3fy68cUPbn2E4sUN7  
Y8MxPKIihosajPUjoTwc  
US4xRYCfsraiKJSm  
sB0tMPVkK4c3SeSsUxJ5  
IYllA8OwdjU0EQGzeORw  
WDLmhEIXcJ3ypgpos2mx  
cjogIzAwMDAwMDt0  
IOq4FQWahEcsRuXoEVT0  
GsF0HOG9bXJnqJ1teUyp  
yqtqrF9xTnw+GGi2r1zc  
cLDiSB4ggXY7JK60  
MA63tXLbh4H0rHF3I9Ox  
FAVdakqqelhkfYR0KLWz  
DUKvwH66Ah6mfBthRj4s  
SPXsPUV0JGDkiKBd  
S2IcgJ5iMfFrKJNwCILr  
T8FxjUKgVOeoV291TCfw  
PqZ1OVDlxxSnW2AwBVLr  
xBhvTbB0p1A3Zd8P  
WA14NY17XC32wQNsv9D1  
oMW6O0FwMJEjhkdehyrj  
zTJ8WTUgZJWsxS57Pe0g  
lTnzPq7jLXPjFLZ4  
IVWuzZUaS2WbdO4zEmLq  
DUZoMWYeI7XpfIJuZOtm  
W015IIoyAlE4BCTciuXh  
B2AbXUTmlNisRfG4  
y8I4Bq9DCh79XB85AN40  
vUKdp4T3pSL0N0AyAJYn  
wajcbmpuoUM0XUXzPKZk  
bB44Tr5piZuhYq2c  
ZOMdQXR2OKKkrSVeH9Vs  
fL7tTjXkSPEzKOIqB5Yi  
xMZxLQgaH415HAzsQaI9  
WNOpaiNcH2UdQLNu  
fSnoIpD1n9P9Lh1XKQbx  
cxo5A9HyCrnhoDJ+PC90  
ZUQeLY07kHJcaHJdo1xn  
fUy1LuIsFCGuARB8  
eWx (more content   
not included)...    [facility] US LOWER EXTREMITY VENO  
US DUPLEX LEFTon 2024   
   
                                                    [facility] US LOWER EXTREMITY   
VENOUS DUPLEX LEFT                      FINDINGS:  
The deep venous   
system of the left   
lower extremity   
exhibits full   
compressibility and   
normal flow   
augmentation. These   
specifically include   
the common femoral,   
superficial femoral,   
and popliteal veins.   
No evidence of deep   
venous thrombosis is   
present.  
No cystic or soft   
tissue mass in the   
popliteal fossa.  
IMPRESSION:  
Normal venous   
sonogram. No deep   
venous thrombosis.  
TRANSCRIBED BY:  
ELECTRONICALLY   
SIGNED BY: Fabio Mojica MD         Normal                                  Not Available  
   
                                                    Billing Authorizationson    
   
                                        Billing Authorizations [phone]  
704  
[id number]#1.  
00OTGTIFF           [facility]  
   
                                                    Provider Orderson 2024  
   
   
                                        Provider Orders     [id number]  
[id number]  
[id number]         [facility]  
   
                                                    Provider Orderson 2024  
   
   
                                        Provider Orders     [id number]  
[id number]  
08#[id number]  
   
                                                    XR CHEST 2 VIEWSon    
   
                                        XR CHEST 2 VIEWS    FINDINGS:  
Interstitial   
markings are   
prominent due to   
patient body   
habitus. No focal   
filtrates,   
parenchymal   
consolidation,   
pleural effusion or   
pulmonary edema  
Mild left   
ventricular   
prominence   
accentuated by   
positioning and   
slight decrease in   
lung volumes. Mild   
right hemidiaphragm   
eventration.  
IMPRESSION:  
No focal filtrates   
or significant edema  
TRANSCRIBED BY:  
ELECTRONICALLY   
SIGNED BY: Fabio Mojica MD         Normal                                  Not Available  
   
                                                    Ambulatory Visit Summaryon 0  
2024   
   
                                                    Ambulatory Visit   
Summary                                 [Image Removed]  
MICHI TOLENTINO  
:1995  
MRN:[id number]  
Visit   
Date:2024  
Ambulatory Visit   
Instructions  
Your Diagnosis  
Hypogonadism male  
Nocturnal enuresis  
Frequency of   
urination  
Dysfunctional   
voiding of urine  
Your Care Team  
Attending Physician   
-  
LIAN GALLAGHER PA-C  
Primary Care   
Physician -  
BELINDA FAM DO  
This Is Your   
Medications List  
Contact prescribing   
physician if   
questions or   
concerns  
busPIRone (busPIRone   
5 mg Tab)  
clomiPHENE   
(clomiPHENE 50 mg   
Tab)  
diclofenac   
(diclofenac   
potassium 50 mg oral   
tablet)  
duloxetine   
(DULoxetine 30 mg   
Cap-EC)  
duloxetine   
(duloxetine 60 mg   
Cap-DR)  
gabapentin   
(gabapentin 750   
mg/24 hours oral   
tablet, extended   
release)  
lamotrigine   
(lamotrigine 25 mg   
Tab)  
oxybutynin   
(oxybutynin 15 mg ER   
Tab)  
oxybutynin   
(oxybutynin 5 mg   
Tab)  
pantoprazole   
(Pantoprazole 40 mg   
DR Tab)  
quetiapine  
rimegepant (Nurtec   
ODT 75 mg oral   
tablet,   
disintegrating)  
rizatriptan (Maxalt   
10 mg Tab)  
tamsulosin   
(tamsulosin 0.4 mg   
Cap)  
tizanidine   
(tiZANidine 4 mg   
Tab)  
topiramate   
(topiramate 200 mg   
Tab)  
Procedures Performed  
Nerve-block blunt   
needle (2023),   
Cystoscope   
(2022),   
History of spinal   
surgery   
(2021),   
History of   
tonsillectomy, Local   
anesthetic nerve   
block in back, VI   
- Laser uterosacral   
nerve ablation.  
What to do next  
Scheduled Follow-Up   
Appointments  
Thursday Oct. 31,   
2024 2:30 PM EDT  
With: LIAN GALLAGHER PA-C  
Where: [facility]   
[facility]  
   
                                                    Patient Educationon 20  
24   
   
                                        Patient Education   Urology  
Hypogonadism, Male  
(Inserted Image.   
Unable to display)  
Male hypogonadism is   
a condition of   
having a level of   
testosterone that is   
lower than normal.   
Testosterone is a   
chemical, or   
hormone, that is   
made mainly in the   
testicles.  
In boys,   
testosterone is   
responsible for the   
development of male   
characteristics   
during puberty.   
These include:  
? Making the penis   
bigger.  
? Growing and   
building the   
muscles.  
? Growing facial   
hair.  
? Deepening the   
voice.  
In adult men,   
testosterone is   
responsible for   
maintaining:  
? An interest in sex   
and the ability to   
have sex.  
? Muscle mass.  
? Sperm production.  
? Red blood cell   
production.  
? Bone strength.  
Testosterone also   
gives men energy and   
a sense of   
well-being.  
Testosterone   
normally decreases   
as men age and the   
testicles make less   
testosterone.   
Testosterone levels   
can vary from man to   
man. Not all men   
will have signs and   
symptoms of low   
testosterone.   
Weight, alcohol use,   
medicines, and   
certain medical   
conditions can   
affect a man's   
testosterone level.  
What are the causes?  
This condition is   
caused by:  
? A natural decrease   
in testosterone that   
occurs as a man   
grows older. This is   
the main cause of   
this condition.  
? Use of medicines,   
such as   
antidepressants,   
steroids, and   
opioids.  
? Diseases and   
conditions that   
affect the testicles   
or the making of   
testosterone. These   
include:  
? Injury or damage   
to the testicles   
from trauma, cancer,   
cancer treatment, or   
infection.  
? Diabetes.  
? Sleep apnea.  
? Genetic conditions   
that men are born   
with.  
? Disease of the   
pituitary gland.   
This gland is in the   
brain. It produces   
hormones.  
? Obesity.  
? Metabolic   
syndrome. This is a   
group of diseases   
that affect blood   
pressure, blood   
sugar, cholesterol,   
and belly fat.  
? HIV or AIDS.  
? Alcohol abuse.  
? Kidney failure.  
? Other long-term or   
chronic diseases.  
What are the signs   
or symptoms?  
Common symptoms of   
this condition   
include:  
? Loss of interest   
in sex (low sex   
drive).  
? Inability to have   
or maintain an   
erection (erectile   
dysfunction).  
? Feeling tired   
(fatigue).  
? Mood changes, like   
irritability or   
depression.  
? Loss of muscle and   
body hair.  
? Infertility.  
? Large breasts.  
? Weight gain   
(obesity).  
How is this   
diagnosed?  
Your health care   
provider can   
diagnose   
hypogonadism based   
on:  
? Your signs and   
symptoms.  
? A physical exam to   
check your   
testosterone levels.   
This includes blood   
tests. Testosterone   
levels can change   
throughout the day.   
Levels are highest   
in the morning. You   
may need to have   
repeat blood tests   
before getting a   
diagnosis of   
hypogonadism.  
Depending on your   
medical history and   
test results, your   
health care provider   
may also do other   
tests to find the   
cause of low   
testosterone.  
How is this treated?  
This condition is   
treated with   
testosterone   
replacement therapy.   
Testosterone can be   
given by:  
? Injection or   
through pellets   
inserted under the   
skin.  
? Gels or patches   
placed on the skin   
or in the mouth.  
Testosterone therapy   
is not for everyone.   
It has risks and   
side effects. Your   
health care provider   
will consider your   
medical history,   
your risk for   
prostate cancer,   
your age, and your   
symptoms before   
putting you on   
testosterone   
replacement therapy.  
Follow these   
instructions at   
home:  
? Take   
over-the-counter and   
prescription   
medicines only as   
told by your health   
care provider.  
? Eat foods that are   
high in fiber, such   
as beans, whole   
grains, and fresh   
fruits and   
vegetables. Limit   
foods that are high   
in fat and processed   
sugars, such as   
fried or sweet   
foods.  
? If you drink   
alcohol:  
? Limit how much you   
have to 0?2 drinks a   
day.  
? Know how much   
alcohol is in your   
drink. In the [place]   
one drink equals one   
12 oz bottle of beer   
(355 mL), one 5 oz   
glass of wine (148   
mL), or one 1? oz   
glass of hard liquor   
(44 mL).  
? Return to your   
normal activities as   
told by your health   
care provider. Ask   
your health care   
provider what   
activities are safe   
for you.  
? Keep all follow-up   
visits. This is   
important.  
Contact a health   
care provider if:  
? You have any of   
the signs or   
symptoms of low   
testosterone.  
? You have any side   
effects from   
testosterone   
therapy.  
Summary  
? Male hypogonadism   
is a condition of   
having a level of   
testosterone that is   
lower than normal.  
? The natural drop   
in testosterone   
production that   
occurs with age is   
the most common   
cause of this   
condition.  
? Low testosterone   
can also be caused   
by many diseases and   
conditions that   
affect the testicles   
and the making of   
testosterone.  
? This condition is   
treated with   
testosterone   
replacement therapy.  
? There are risks   
and side effects of   
testosterone   
therapy. Your health   
care provider will   
consider your age,   
medical history,   
symptoms, and risks   
for prostate cancer   
before putting you   
on testosterone   
therapy.  
This information is   
not intended to   
replace advice given   
to you by your   
health care   
provider. Make sure   
you discuss any   
questions you have   
with your health   
care provider.  
Document Revised:   
2021 Document   
(more content not   
included)...        Normal                                  [facility]  
   
                                                    Urology Office/Clinic Noteon  
 2024   
   
                                                    Urology Office/Clinic   
Note                                    Chief Complaint  
Testosterone level   
f/u  
HPI Staff  
7 month follow up   
w/Labs  
T levels 359, FSH   
3.3, LH 4.6 4/3/24.   
Previous level   
2/15/23- 264  
Previous DX:   
Hypogonadotropic   
Hypogonadism, High   
serum T4, Frequency,   
Dysfunctional   
voiding of Urine &   
Post Void Dribbling  
*Clomiphene 25 mg   
QD, Tamsulosin 0.4mg   
QD therapy &   
Oxybutynin 15 mg ER   
qhs therapy  
Dysuria: pain every   
once in a while  
Incomplete bladder   
emptying: denies  
Hematuria: denies   
visible blood  
Frequency: 4-5x a   
day  
Urgency: sometimes  
Nocturia: once a   
night  
Stream: sometimes   
hesitancy  
Leaking: denies  
Post void dripping:   
sometimes  
Wearing pads/   
Depends: denies  
Urge incontinence:   
denies  
Stress incontinence:   
denies  
Incontinence without   
Sensory Awareness:   
denies  
Abdominal pain:   
denies  
Flank pain: denies  
Sexual complaints:   
Inactive  
History of Present   
Illness  
staff HPI reviewed   
and agree.  
Review of Systems  
no fever, chills,   
malaise, myalgia.  
no rash/lesions.  
no chest pain,   
palpitations, or   
SOB.  
no abdominal pain,   
nausea, vomiting.  
no unilateral calf   
swelling, redness,   
pain  
Physical Exam  
General: nontoxic,   
NAD  
Mouth: moist mucosa  
Lungs: normal   
respiratory effort  
Cardio: regular   
rate, good distal   
perfusion  
Abdomen:   
nondistended, no   
suprapubic   
distention or   
tenderness, no CVA   
tenderness  
Neurologic: Grossly   
normal  
Skin: No rashes or   
suspicious lesions  
Assessment/Plan  
Dr. Marquez pt  
Pt has finally had   
successful pain   
management for his   
chronic back/LE   
pain. Walking   
without a cane for   
first time in >1 yr.   
Able to work again   
(insurance agency).   
Not in daily   
constant pain.   
Mobility is   
improving. Has lost   
25#. Mood improving.  
1. Hypogonadism male   
(E29.1: Testicular   
hypofunction)  
Originally saw an   
outside provider for   
Low T. Sx included   
low energy, low   
libido, ED.  
Pt was intolerant on   
intranasal   
testosterone   
replacement. Pt had   
a reaction minutes   
after first   
testosterone   
injection.  
Also, Pt wishes to   
maintain fertility.  
Pt had brain MRI   
March of last year   
ordered by another   
provider which shows   
no tumors and nl   
pituitary. [1]  
Testosterone   
(ng/dL):  
22 - 62  
23 - 1.07  
23 - LH 5.4.   
FSH 2.9. T 286   
(264-916). Free T   
11.3 (9.3-26.5)  
09/15/23 - 264   
(264-916). Free T   
13.2 (9.3-26.5)  
24 - LH 4.6.   
FSH 3.3. T 359   
(264-916). Free T   
10.4 (9.3-26.5)  
Was taking   
Clomiphene 25 mg QD   
(50mg 1/2 tab   
daily). Had   
improvement in   
strength, stamina,   
energy, and mood.   
However libido had   
not improved at all   
which was   
bothersome.   
Increased Clomiphene   
to 50mg qd at prior   
OV.  
TODAY:  
Total testosterone   
level has increased   
from prior. All   
recent labs wnl.  
Overall pt feels sxs   
have improved and   
feels satisfied w   
current improvement.   
No indication for   
dosage changes.  
-Cont Clomiphene   
50mg qd (full tab).  
-Heart healthy diet,   
lifestyle changes.  
-6 mos w/   
Testosterone F&T,   
FSH, and LH  
2. Nocturnal   
enuresis (N39.44:   
Nocturnal enuresis)  
Started on   
Oxybutynin 5mg IR   
qhs at prior OV.   
Gets up 1x/night and   
no longer wetting   
self (compared to   
enuresis 3x/wk   
previously).   
Satisfied with   
nighttime sx   
control.  
-Cont Oxybutynin as   
above  
3. Frequency of   
urination (R35.0:   
Frequency of   
micturition)  
Taking Oxybutynin 15   
mg ER qd. Voiding   
4-5x/day. Reports   
intermittent   
hesitancy which has   
developed over the   
past month or 2,   
which is very   
bothersome. Might be   
on too much   
anticholinergic.   
Will try reducing   
total dose.  
-Decrease Oxybutynin   
ER from 15mg to 10mg   
qd. If sx don't   
improve or anything   
worsens call office.  
4. Dysfunctional   
voiding of urine   
(N39.8: Other   
specified disorders   
of urinary system)  
UDS 22 -   
delayed sensation,   
large bladder   
capacity 870 mL,   
normal max detrusor   
pressure, EMG   
activity when given   
permission to void   
which quiets once   
flow starts.   
Staccato shaped bell   
curve. Sphincter   
squeezing   
inappropriately when   
trying to void on   
cystoscopy. [2]  
Taking Tamsulosin   
0.4 mg qd. No SEs.   
Good stream,   
occasional hesitancy   
(see #3).  
-Cont Tamsulosin wo   
changes  
Follow-up  
With When Contact   
Information  
LIAN GALLAGHER PA-C, URL [address]   
[place]   
[address]   
[phone]  
Additional   
Instructions: 6 mos   
w/ Testosterone F&T,   
FSH, and LH  
Patient Education  
Hypogonadism, Male  
Documentation   
recorded by the   
scribadams Maciel   
accurately reflects   
the services(s) I   
performed and   
decisions made by   
me. Authenticated by   
Lian Gallagher PA-C   
on 2024   
15:46:35.  
I, Jessica Maciel,   
personally scribed   
for Lian Gallagher PA-C on 2024   
15:33:43.   
Electronically   
signed by isabella Maciel on   
2024 15:33:43.  
Problem List/Past   
Medical History  
Ongoing  
Anxiety  
Depression  
Dysfunctional   
voiding of urine  
Feeling of   
incomplete bladder   
emptying  
Frequency of   
urination  
GERD   
(gastroesophageal   
reflux disease)  
Hidradenitis   
suppurativa  
High serum thyroxine   
(T4)  
Hypogonadism male  
Hypogonadotropic   
hypogonadism  
Nocturnal enuresis  
Post-void dribbling  
Sleep apnea  
Smoker  
Weak urine stream  
Historical  
No qualifying data  
Proced (more content   
not included)...    Normal                                  [facility]  
   
                                        Comment on above:   Result Comment: Elec  
tronically Signed By: LIAN GALLAGHER PA-C\.br\Date and Time Signed: 24 15:46   
EDT\.br\Electronically Co-Signed By: Jessica Maciel\.br\Date   
and Time Co-Signed: 24 15:34 EDT   
   
                                                    Patient Letter FTon 04-15-  
2024   
   
                                        Patient Letter [facility] (Inserted Image.   
Unable to display)  
April 15, 2024  
MICHI TOLENTINO  
[address] [place] [address]   
[id number]  
: 1995  
Dear Michi,  
You missed your   
scheduled   
appointment on:   
4/15/2024. Please   
note our appointment   
slots fill quickly.   
When you fail to   
cancel or reschedule   
an appointment the   
office is unable to   
fill the appointment   
slot that was   
reserved for you. In   
the future, we ask   
that you call 24   
hours in advance to   
cancel your   
appointment. Our   
current reminder   
system gives you the   
opportunity to   
cancel by responding   
to our reminder   
text, phone call or   
email. You can also   
call the office to   
reschedule during   
normal business   
hours or use our   
on-line scheduling   
portal at your   
convenience.  
Our goal is to   
provide convenient   
and quality care to   
all of our patients.   
We appreciate your   
consideration   
regarding any future   
cancellations.  
Sincerely,  
[facility]  
[address] [place] [address]  
Phone: [phone] [facility]  
   
                                                    Coding Summaryon 2024   
   
                                        Coding Summary      HTMLBase 64   
VzfeaqheDLq5bJc+PGhl  
YWQ+WK5IJFGyQ10bgGPl  
vU7rI5WFTNbKEivnOWQP  
QDaTZhTwodTiMP3cwLGl  
ZXJu  
IC8+LW3uYOCeOqhecAGo  
h1T6lVE4X91ltw8jGBld  
bNX4UNGcExAzhknsf8dx  
vNm3ECbqMlpjVtWs  
REIldH46UIC6kC52Nv33  
hNLgnNPui3ezvMz1ZtXl  
FWHjESK9sTebVYpwa4Je  
EPFtO12vqJOhg3K5  
IGNvbGxhcHNlOyBlbXB0  
kS8qNFyiwrfax4sdhgix  
Ils1me00kKOox0B2oUH6  
N7ClzdJ7SVNrcYTq  
XakoyOUDuJ7fjzwmz1xr  
kiyqNuEmZOVsDGi1IDt8  
RHBedHtsVyVcVB88CCX7  
KKLnfsJuO7OjDEYj  
yQdtZwX4t0Q8Zf5VD4HJ  
PizeT5AHOOBYBUhhhGE+  
AO78up85L8XhFkdsBhj8  
KYJkNTD0zYT9fT7a  
IUNaCMuhq2Y4fYJ9T5Nb  
hsVoxa4tl2rwWBStKCjq  
Q04wuZBzu7X3OJTsbQA0  
GAXobSwpIvFcrP62  
Oyc+MKNsvDfsc0ZuHhut  
p4yhy8wxuRb2UnliVXXi  
qdBmgWlqJEW3n8YrWh6c  
LWRpwLT3vIW5jE1i  
PmAyOhU4IDzwL247OxBh  
mMTiMmydC44lH5VguHQ+  
NJHtWmg9WBQjjPljOI6h  
S7WnSLTrefprnSDw  
qPntXV8xYWJizxxcJAAv  
iC0rBMOjV0g8FsWqTfH9  
NQrgU8PpDVHtpijhEa02  
sT4eRdOgSpG7GEyx  
N8CvevT5TWFbdZTxOYan  
ZNW4J21ct9E2FKFiVSVl  
ADM3jCP7pN7hjRahetng  
bGVmdDsgdmVydGlj  
WTzsPAwpB140VZHpzRvs  
PkNvZGluZyBEYXRlOiAg  
MDQvMDkvMjAyNDwvdGQ+  
KVLkCTA9cGzbYLHr  
nYJfYWboDr5fmHzhxRyi  
SE1wOBDtxondRGJqdA6v  
NXHtzRAdtPvcDM0cQDJp  
rdfsg542JdOtOWO2  
BRMfvRVuX0BmeK2sHtOw  
YIKmMTQyL3IumIMbUOmh  
P388BMrnYvD9ICUyurTb  
T2IoGKHsnFblLuH4  
u3V5Cf9Wm2LzjkuwI4Dz  
pNUpJkWnWhrcSGd7G2Rj  
PjwvdHI+LS40FTMtSU00  
OUi8BIR2iEmzHPur  
EKKjU2SguC5rKxPqIESa  
ZGRkOyc+PHRhYmxlIHdp  
ZHRoPScxMDAlJyBzdHls  
FI7aPd5tTRPkOHZa  
uCovfAIcVjOna4zoGYVr  
OAvoPZ6wuWwjJ3FmiJH3  
WPSsb8x4Md97S64eF4Gp  
dXA+AOTudNG2iES8  
yG8cFyJrDvR0VPhlF532  
CiMovGFrYntcx1lea6qm  
jNd1TqR4ASCesaSpvBfz  
JTG0s5NqBu42I42c  
IHdpZHRoPSIxNSUiIHZh  
xAhzpf2tcZ8xDb4+PGNv  
oOS7pAL2aV6wXaMrKxM1  
GDeaQ982QzBiuNGa  
Srqiv6gka0bkfJg3AqNd  
KPLdwzRfwVpxRNK0m8Qw  
Uz41I1IvtKmwx7UhMrb0  
ju26yBIdi2P7aKT0  
V5BaLDStjjbndFDhfHiw  
SN4tEVKtvtuwMKZgyY0w  
GMUvP3f0AbXlYlU3REhx  
T4DgveJ3RVIgqZJw  
IYHdlIRQjH2btfbrk0wl  
wwupZfKyQPIzNMx4YGq2  
TKWqgJwnZdEaSKD8WgO5  
PPY1gFLhkU2elLtg  
gsvgfA4oNeb+BDN5yIQu  
dZSATY8yQvhtgIP+PHRk  
MAL7dYtaZPxtPQMdrO6c  
ZJAqV4e5FyKlPtR5  
POfiC5FvrdB0FWNxaJPq  
LDOpxRHZkD7zmspqd5lj  
lckoJiVqYHPzIYv0LNk7  
LWFsaWduOiBsZWZ0  
UiV6WUY9gGPelH4fgKti  
vxwmyF9lLra+QmlydGgg  
YND5RSa5N0XbSny5VYMc  
eQmaMA9yhZDgOZsd  
Kd9glHhfaJvsWF3yESWa  
hunlj049NjNhc4oeLQBc  
rYIxNViyAUL9M84yb4B7  
HRTbJSKmZMS7eIA8  
iD1rkYzjycwiwWXatQop  
jhNccDnmPGmlFSwgC851  
IFZsjIhrSzZoDLk9M8Gb  
Kmy0SUEavClhXT7w  
nSItQMlfTr9hcRaeeIel  
NO2rRBKhaczik552LnFt  
y8zhQVTwkZVuLKvmBCB1  
T73vy8N8MPRlHDKs  
EMG5jTS4fZ8sfQbbupjm  
bGVmdDsgdmVydGljYWwt  
SWhmI866LDKotSsbKlWf  
dAd8E1OgEbg9DJOs  
gNjnUU8deJKjPKzoBa0h  
yRzutEhoZC1iUKXmdebw  
x624RgNyh1lhFIDicLMu  
WScbTQI6H47nz4I9  
WKOvHAJhJCV6wOT1wP9s  
bGlnbjogbGVmdDsgdmVy  
fKeuWBppZVgxA265YNYr  
cDsnPlBhdGllbnQg  
WEueMIc9H5MzNelwfLH+  
UR39LDRsGI95yEDjzCKi  
x5dtaQt0AjCbNTNhGYR0  
cLzzLUuxg3ZhKGCp  
D68phRIdf3V9JTQmgZzw  
cLVtJxBbnHU8rZ3jGAgb  
ojmkm2xlsioeSbaad2bb  
ab91kQ02H95sQGfw  
ZHRoPSIzMCUiIHZhbGln  
pr5jmF8eDz6+PGNvbCB3  
sRN7cY1vFQPbHqX7KWvy  
W764YuDlrQMaGkad  
m4zdo3utdQd9HhC2RMFi  
mrZpmBclMHK1h1BqIw15  
Y00hIWmdLFQdQZEcZCSw  
GYUrjDziav4wrO2v  
Ii8+SKFgcGK8hHM5eN0u  
OrVyYuT6HLiyL140KcYi  
xGBeFmgcC14xW4MebLE+  
MRIoFut3XYSnwBoc  
YL4zsRDwRBmnDu0aXZC4  
WqSkWwTeMLaaC7ZdWKIn  
fmjhxlipnIF4LSRaMFYj  
gD28Nz5mtPthXQRw  
dEZBmJ0wvmayy1dsxjhf  
XuPzHTUbGCy8HUm3GMMe  
gGwjXzEkOAJ0BjV2DPG1  
mCRmsU2lwSwhjtyf  
jV0lD8RoHBXzfgqzYc88  
aT4eAwWmYtA9CJhjPcm+  
WY4GQM2DDDVBHQZEFJ3B  
RVIgREVBTjwvdGQ+  
GISsQZF5uGtjBPujDQZt  
nH5jUNVcB4j6IaPaJcX4  
ORbvR2IoRIXrbkuePc44  
bM0aImZySeR8AOrf  
K1WzquZ1HIVojMLxTMbu  
MXY9X27kb1J6VECsGJIn  
NOX3tWK4oA3ubCjbckhq  
bGVmdDsgdmVydGlj  
YBehRIgdT834JGLmwSwl  
AlF1IpEpVrF4YMA2E7Vh  
Kym2YURmgOmlAJ1cqZKn  
ANkuQu5stVifbEff  
ZW0bNWEmisooGXKneG5e  
WUPgrYLlmVkhFD3rULUh  
lshdk838ZeByCTC5GAYw  
iKMsH5YmkJ3sEhVr  
FNOkVPDtQ9NcoBZdQKyu  
O658ESbiNaI7STEywvRq  
R0YoVXPkqNvkYeY8m7S1  
Ej1eRPOLZZDglwqs  
dGQ+NOGqKON4wDumINft  
WELauP2mQUNrY1f4UzAf  
MlH3BOdyP1RzSAUzboxp  
Zl18oV9cPdQiJmL7  
YPjrB4PjhtV5IBHtpQKp  
HFihLEX2N12lk0S2PCFz  
ARGsMUX5xDU4tV2dfFou  
bjogbGVmdDsgdmVy  
sMrnNYfjLOtgC566VOXo  
nNprKg3WPTX2T6ImNom8  
ETSoeUioZY5tbYYzWKff  
Tx5cwRgfyChwBT2w  
PSOccjmcCLLajW9zXPYt  
xUFzcHkcPU7yLSDqngny  
a656XmShQGH9GBAmbSEm  
D4AggG5gNdZjPVCo  
CRCbT1OufEAbPBqgB019  
BBmbEmW9ZXSvuzEhT7Sv  
KRUjzCfrMdY4y7Q4Gg2P  
UDwvdGQ+YG44mx68  
C6OgEphhFft7XDGiVTS9  
uOE6gB7zPTIaEOyxz8J2  
lHV7E9BjzuZdtg5xs7dc  
IBSrKZhdM52vhCAg  
p3N8NGNigHL8HWEfeSlb  
TyWvgW86Lwy+PGNvbGdy  
c0SmTonvc3huo0hqpRl1  
IjMwJSIgdmFsaWdu  
HMS6h2GgWe41N81sRFkt  
ZHRoPSIzMCUiIHZhbGln  
qm8rqV4fVq5+PGNvbCB3  
rAP1hP0pMvYaLcT8  
LZwbP506NcUjmQDhWjgv  
q8xyx8nwpTo1FcDoFNSx  
bxXwgSpnVMK0n4JvYs40  
Z5AqnWzgh0WcZpj8  
es59nGJwv0L6eCH8S9Bi  
CUXakcmhaGTtkLtrPZ6r  
DDFtpghxQFFacS0nVQTn  
O9x7CdAaTwY1PQqw  
R2QmkiD9NPBdqWGsJZDo  
xSFUdL3ugddss0frfwdu  
QvOnZLWqMKf2XEu1EYZi  
dIfiQqKkBWH1KiU4  
SVI2lOPtcV0qnJusdzxq  
bA0tYtq+YRs1u8nuwPTl  
YE6njMC9IH53YM07vBIq  
g6U4rAP1X1OwQRMq  
bgtrewwjmSI0PXDvREMj  
pM45Re9jxYweOr2kIJEp  
FHI0SMMgrSHnP5VlyE2s  
BvJeVPPoEXHgH4Nm  
zRHhVWkoM475JFycJbW4  
YYFjobYrJ9GhZSAkvKni  
FxQ3p4U9Nq6BYO10TS30  
YU54wMLae2T2vDV8  
V7WwGRTlvcurzomriOR8  
BKUwHZPyiA08Fz7ryVue  
Uw8wVFIxFKL3RDTiuBFb  
E7VzoL2uObOlIJYt  
EQOiD2RvtBVrLLvzW031  
ALwzWhG3UHOxcjWhJ8Ha  
GYScxEhnLgO1x7G3Qa1W  
Qq05YN83EC90zNLk  
p9J8uVT9U3OdIWWfsytn  
zgfvtOU9TGUmJLAbbU08  
Yj6vcAbhPc9zXRLwQTT3  
YTDplLOjF5GecX4m  
AlRkFLXdIJOfL6TjuDUx  
VYtnS285ZFcjAbO8ZEYr  
drGcC7NkFFGtdCfqSqE0  
m9P0Ko3YSPkysxo4  
Z6AdSulpwSS+MU30YWJn  
SF33mXFcjEJgu8kxaDf7  
BkTyBMWdCTO0uTluLDsq  
t3PlQYHxM40obSNo  
c2U (more content   
not included)...    Normal                                  [facility]  
   
                                                    F+T Testo LCon 2024   
   
                                                    Free   
Testosterone(Direct)      10.4 pg/mL                Invalid   
Interpretation   
Code                      9.3-26.5                  [facility]  
   
                                        Comment on above:   Result Comment: Perf  
ormed At:  [facility] [address] [id number]  
Jose G Kyle MD Ph:[phone]  
Performed At:  [facility] [address] [id number]  
Leora Hatfield PhD Ph:[phone]   
   
                                                            Performed By: #### 3  
[id number] ####[facility]   
(DEFAULT)[address] [place] [address]   
   
                                Testosterone, Serum  ng/dL       Invalid   
Interpretation   
Code                      871-790                   [facility]  
   
                                        Comment on above:   Result Comment: Adul  
t male reference interval is based on a   
population of  
healthy nonobese males (BMI <30) between 19 and 39 years  
old. rolando [place].al. JCEM 2017,102;6542-7518. PMID:  
48102896.   
   
                                                            Performed By: #### 3  
[id number] ####[facility]   
(DEFAULT)[address] [place] [address]   
   
                                                    Lab Reportson 2024   
   
                                        Lab Reports         [phone]  
[id number]  
#1.00TIFF           Normal                                  [facility]  
   
                                                    FSH and LH LCon 2024   
   
                                FSH LC          3.3 mIU/mL      Invalid   
Interpretation   
Code                      1.5-12.4                  [facility]  
   
                                        Comment on above:   Result Comment: Perf  
ormed At:  [facility] [address] [id number]  
Leora Hatfield PhD Ph:[phone]   
   
                                                            Performed By: #### 3  
[id number] ####[facility]   
(DEFAULT)[address] [place] [address]   
   
                                LH LC           4.6 mIU/mL      Invalid   
Interpretation   
Code                      1.7-8.6                   [facility]  
   
                                        Comment on above:   Performed By: #### 3  
[id number] ####[facility]   
(DEFAULT)[address] [place], [place] [id number]   
   
                                                    Provider Orderson 2024  
   
   
                                        Provider Orders     [id number]  
[id number]  
#1.00OTGTIFF        [facility]  
   
                                                    Coding Summaryon 2024   
   
                                        Coding Summary      HTMLBase 64   
WkvbvmjbDUs0aCk+PGhl  
YWQ+VG5INPMmS11adERm  
oD1mP4KTQMmCIydoYBRI  
VDsLOaWpbiUzQK8bgGRt  
ZXJu  
IC8+DL0oQACmNtfxmJDb  
b0I2bHW4P61hum8bJNjc  
rXH7MVVmGwDwynbly1oe  
eYe9WGneGhlgZgYp  
EJIykO75INP8gT68Kp00  
wYEhbFTub9rpuLi1MtVo  
UVQrXYB1tKruRMycw8Fj  
ENZkV12nxWBzb1D3  
IGNvbGxhcHNlOyBlbXB0  
wF5rRGsbcagbt2oqlmqo  
Yzo0hj99sQUsf6G8aNL3  
C2WnaoX4MJVgfEKd  
EjlcfOUHnH9gtzfbz7bn  
qyaqRoRkDRGyGQo6NAh3  
ZXScaEqfYwImYH20HJG4  
WYXpdzHyO8DeYKOz  
hDokOeY1g9J6Zs6FT7SP  
QqhyL2RAWKNYNDudlLU+  
FK09xk23D5RpGcitPav0  
DOFoOBM5fUC8qD1p  
IBByVPteh1E3yYH4S6Vy  
ruXbkh5uy1ucZLHzXGrb  
O97irRFlk5L4KWXpiJJ7  
SNWaxUbkVfZzcN53  
Oyc+FGMyyOixu7FjYzju  
g8xjx7grmSr7YtnqAWDd  
czSluZrjGOJ8q3SjQv6g  
RIZfnOL3kPP5oA9n  
OtCoHuQ9SNaxT628YtFf  
uPKcMrsgQ39vW0UktEF+  
FTHsNgm0KHUinDvjHW5y  
I2AhNTWfibhyeQOc  
kRksWV7hPKIxsgtpXEVd  
gC0pRGPtO7l5JkTyXhN1  
QLojJ2QzLTYdjgcaYm53  
vC2bEjMyQeI5RNes  
U8BarnY7UWHlnPVrMXsm  
IXG2Z88zu6D2KQXpNYCg  
MBD9uOW5hI6npRgypbvt  
bGVmdDsgdmVydGlj  
ILqeEGjkG214PSJnmMoa  
PkNvZGluZyBEYXRlOiAg  
MDEvMTYvMjAyNDwvdGQ+  
YLXqYSK6zZqhCCZk  
dJHrUTqqNc3bpLyzlVzl  
II9iERBxdqsoRUVguD8f  
JZKihTQiqEyjES3fCIOh  
hcfnj007UnVeYJH7  
RVMrfBOlI9NasM6jCkTn  
UMTmRQYzF9HkmWZbRWpw  
V543DHnzPmD2AYMjvlKp  
P2GcDXYilLtiYmR3  
c5D5Ff4Ao5YulrqpU3Om  
rXHyMsFiCagpRQl6Q2Dk  
PjwvdHI+ZT91SGNyNK38  
DVy0MJF6cQkoBVif  
WCWbM8ZucM8yUcEpRHAn  
ZGRkOyc+PHRhYmxlIHdp  
ZHRoPScxMDAlJyBzdHls  
WF1tAg7aUNXrPNVf  
sFkyxHSzFmFed0bxBUSc  
YDvoBS5lrFvkW9MwfMC6  
WUAbs3p0Sm73Y52mQ2Qk  
dXA+ZFQwjFG4lKF2  
jN4mDtSlOaF9CPcfE657  
CiYyyNYbZcewk6wox6uw  
uQc9QlP3PFVtreObiApk  
KRS9c3QuSj44B58z  
IHdpZHRoPSIxNSUiIHZh  
kAshiq8zjM6kNu8+PGNv  
vNH7aNN0yM2kUqYtVaP6  
DJevD969BmHeoOPf  
Wfvmp4kdq4sucAu3HwEw  
YESsucTebAdlRGS1s9Em  
Bp99K0AqiYppj0IgKhv4  
fu51hQJdb2V1jTG7  
D1FrIKOffflspTVdxTjw  
DS8pPTVtoagaLXYnpD3l  
CHQgH2u9LgNmHaR2EQjb  
J0IpbzD3UMRnoQJg  
WGCoxJTEoI1pmvhkm4kc  
ehcjWjNqCNIoYSe7EZz4  
XWPsyVggLoIqQCA7PtH8  
WDK4fKXgwT1hoZae  
oohqsU2rZdv+KWU5dZRt  
oNJLYE6hAnjpyVY+PHRk  
FAT4rEiuGIquEJTesW1r  
HWPhU3x8RjXaRfE1  
GObgT4VjteU8LQFrsZZu  
PBHboKVKuG9xupgfd3wz  
bwlnKuCaGZFrFDo0NKs5  
LWFsaWduOiBsZWZ0  
CmQ6DBM3qTDpoI2bvBbm  
yippoC1pXoi+QmlydGgg  
QFC9RQd8U0ZqJuw7LVBr  
hLjhSK5hcGVtNLmg  
Xi2itOujySgfMV8uQOCw  
soawu841KaHog7snZHOe  
zGRnGQbkZNW4S28or0I5  
LVJsAVPpDMA4oYM1  
eF1znNuncyodgZAgrJoz  
ujHawNpdWLwwIYamD951  
AXPwtLvvTxMgCJg7E5Lg  
Nga7ZTZwmSxjAX0j  
fSBdHGghUg2stQjulLpy  
WK6zMSHrivyil123XzYd  
q2smXGKirYGzJOxuDBA9  
S09yy6G2IWRdZGIw  
CLF7rFJ8aO5kxBxdgrmz  
bGVmdDsgdmVydGljYWwt  
SYikC575DQKnaMlpLzTy  
eZd9A3BrOku2TDMy  
eZjvBY9yuKIdJBjpZh0e  
nOhgmYxwKZ7sIKSyetjz  
k773QhXyj8rvSWRrsMAb  
ODfpHLC7A87ta7H6  
COTfUOWhJTM0vJD2nN7g  
bGlnbjogbGVmdDsgdmVy  
eFkbOJcyEAntQ258SKAt  
cDsnPlBhdGllbnQg  
DKyqYMr0N0DcRlenlXS+  
YL84IHVgXT28wEGewAJj  
h6ddbSc8IqPxWXQtOWS7  
uMsnADmxz3SnZUFn  
A28vfBJyq1O7FCBpcAhj  
kXFnPsDiyGM0rB8uZIsj  
vwsay6ctotyhSoxel0yq  
gd01dF83N09vMSlq  
ZHRoPSIzMCUiIHZhbGln  
ju9dnX0yAc1+PGNvbCB3  
zJH7bK2qINEgBnW2DZwq  
H818EjGukGHjUynu  
l3qpj4oviWe7WsB2BDCc  
krXtmMeeHJU9z9WoEk55  
S55pGJbaFVVxQXKrFBFs  
TUMmhJnoqq1diY5i  
Ii8+ILJozNA4aGY6jG2a  
LxPhSjN4TWqiC863QaBx  
kHVhWcxhC66kI2UdpPE+  
QMUfIfz1KESciTuz  
GO5gpZAyJOudUv4aWGV1  
OcEwBnNjSDskF6UsOLRj  
lboomvtkhRA6JVKoFCCi  
fF28Na9deKpkNGLp  
pEOGlY5zilvba3rqjrdx  
DsEtGNZeLAs8FGx3GQUy  
mJouXwCgBQF6RrL3BYF1  
lJTyiA5afMnpchgk  
cB8gJ5MeKKMyeiojJc07  
jO6oNwLxTtJ8BXwaGre+  
XV9DHL3GDHHLBVGDGZ9A  
RVIgREVBTjwvdGQ+  
JQUrLLN3lGupITyjHFIg  
tA1nHBOjT3f0OpNnFcU5  
SFyeQ4VsUHKsoealHa32  
jH1eEiZjUmL8DVab  
Y3WiigO2SZZqpDNvMXov  
TEE9W11tl1D5LMQlSJMb  
DNJ2pGQ8mK9qzJrqaypu  
bGVmdDsgdmVydGlj  
IXokSWucC233ATPzaMsw  
CgL4SxIgRaY8KMM0Y3Pv  
Abg4NYUifZjoNJ0ufSQb  
ASzaId2kjQyqsBhs  
FW7rUNHcirfjPYZpbL5q  
NTXvrOYvcAxyWK5mEBTb  
ghzkr773ViFxPFF1XJIz  
mWFaG1SthP4bHqGo  
KGKbURRiA5EuuDLxBVfi  
S069ECwrFnQ7VMAdmqSm  
N9GaKHKzmYdkNhT3v1Y0  
To3yGXAFDVVgdgyi  
dGQ+DABsCPW4lLwyUOlz  
ZCAnqV2dWTJrV7j4BaYt  
PaW5QHdnM0EzVLOqctky  
Kt13xF0lWfBdQdD8  
IUxvB0UihnM3TJTxaWAl  
XDiuHKB0E93bm0L3VRZr  
QQWrPRR1uDQ2wV1gzNjw  
bjogbGVmdDsgdmVy  
lBcbLBhxAIuoI740FJQu  
cHixGn9MHLA5M5BzPyc5  
APMyyBeaOF8kvXFhMHiu  
Dm5vuDjmqZyiKV6l  
WAFqlnunNTRnxG7zONCg  
rOIxfIhbRM0dZITvdxxw  
l150PkYxZYF8NOAunVAa  
P9LlwI9sFjDoBAAs  
DCTdK8DpmUZxPSlcW718  
JAvmBnB2UPLeyfZzP4Jq  
ICYjfMnuIwE6b7X3Tn0Q  
UDwvdGQ+AY13iq90  
B1NwVsoqTxn9TFHdDMM8  
rJK5zO1tLVOtPNxou6P3  
vHZ4L0KsnjImpr1gg0is  
ZJJbMBceD27emFZa  
r5E8ZNBgoIB9YSZpiFmq  
QrUidL45Gwg+PGNvbGdy  
d3LgReopw1wrf1ebvOz7  
IjMwJSIgdmFsaWdu  
FAI6r7ShMv83S98gZVxn  
ZHRoPSIzMCUiIHZhbGln  
ej1ytL6qOy7+PGNvbCB3  
xZH5aM6uBpKtOvL9  
IMqwS699LmOceXDbMyhb  
c6rrw7blcAg0QiLkTUNq  
kqQmtYbpYVK6z0SwSy59  
G5CruRgbk9RpIvw7  
fo28oPHkq1P4rUA1Q6Hu  
SBHlatpzhZQnyWxbRJ0p  
DNDscjmiNGTgrN7rHAId  
O9g8HyGcUjH2TCce  
W9MtnrP3FEYhzLFjXAVo  
mXBGiE3birtne0cykeek  
MdSkJTFkKJd8IMc4XNOu  
xIivNoNoSHS9IlM7  
EYX5mJQhbF4tnLdwpsjg  
dC7bSdu+EJn0s1kdkQWy  
DI6lwNG5GT29LI23vDYq  
t8F2cAB8K9FwTXVd  
zhoowbzlrGD7RSImCJRy  
nY05Xj5dlYwkCe4rCCWr  
FLY5ZEIzbYDcN6CaiD1v  
OiNrMVVwETRzI0Fs  
uUJdOZftV185GFuxIrN4  
RONzctLlQ3KqWJGpfGkn  
QrA6q6G8Dy6KGO76ZU54  
XB40oZSvj7Q2sIK1  
T8QlFFZcgqilgzlogRP9  
GWAzXFTfkL91Wb8shArs  
Kv9fAQBtXSI2ZWWowVBj  
O9MczC0xAcMlPDEb  
JLLkH2FvnPKwIJocI364  
HDwcPeD6MCRunpSpY7Ru  
BFGmvCpoAnL0m4M9Jo6U  
Zb89VG61AC29wSRy  
y0Y9gSJ6I7OqAWUxjbzg  
tyvnbZO2DVTnTQUbqS96  
Cc7npTcoIf1rYCEuLKO4  
BYTudDNvU2AwzZ1u  
TrVqUFUyALWiR6JeeUMu  
AFguP680LPlmApL9IFTv  
csYkJ4TmUTEuxZrbIsK5  
h4M8Qg3WRGxjdxc3  
C1VtTvoqoIR+LJ62XCWu  
IU34iDUswQHas4axtEv4  
FzAjXEJvJGN8oAhjCOyo  
w8TtXMNfK00crURr  
c2U (more content   
not included)...    Normal                                  [facility]  
   
                                                    ED Clinical Summaryon 2023   
   
                                        ED Clinical Summary [facility] ?   
Urgent Care  
[address] [id number]  
[phone]  
Clinical Summary  
PERSON INFORMATION  
Name: THERESAMICHI DUMONT KWAN Age:   
28 Years Sex: MALE  
: 1995 MRN:   
Acct#:  
Visit Reason:   
Abscess - axilla;   
UNDER ARM RASH,   
PAIN, DISCHARGE   
Arrival: 2023   
09:28:18 Discharge:   
2023 10:45:00  
LOS: 000 01:17 Check   
In: 2023   
09:28:18 Checkout:   
2023 10:45:00  
Address:  
[address] [place] [address]   
[id number]  
PCP: EFFIE FAM  
PROVIDER INFORMATION  
Provider Role   
Assigned Unassigned  
FRANCHESCA Titus, Samantha ED   
Nurse 2023   
09:53:10  
Buzz Diaz ED PA 2023   
09:55:10  
VITALS INFORMATION  
Vital Sign Triage   
Latest  
Temperature Tympanic  
Temperature Temporal   
Artery  
Pulse Rate  
O2 Sat 95 % 95 %  
Respiratory Rate  
Blood Pressure /77   
mmHg /77 mmHg  
MEDICAL INFORMATION  
Medications Given:  
Allergy Information:  
shellfish  
PHYSICIAN   
DOCUMENTATION  
DISCHARGE   
INFORMATION:  
Discharge   
Disposition: Home  
Discharge Location:   
Home  
PATIENT EDUCATION   
INFORMATION  
Instructions: Body   
Ringworm  
Follow-Up:  
With: Address: When:  
EFFIE FAM [address] [place]   
[address] [phone]   
Within 3 to 5 days  
Comments:  
Diagnosis is tinea   
corporis or ringworm   
in the left axillary   
region. You have had   
this for couple   
weeks, and as   
discussed this is a   
fungal type   
infection, and can   
take sometimes over   
2 weeks to resolve   
with treatment. Will   
provide new topical   
cream apply as   
prescribed.   
Follow-up with your   
own primary care   
provider next 3 to 5   
days for   
reevaluation. Return   
for worsening   
symptoms or   
concerns.  
DIAGNOSIS:  
1:Tinea corporis  
Patient Understands:   
Yes -   
Patient/family/careg  
iver verbalizes   
understanding of   
instructions given  
Comment:            Normal                                  [facility]  
   
                                                    ED Patient Summaryon   
023   
   
                                        ED Patient Summary  [facility] ?   
Urgent Care  
[address] [place], [place] [id number]  
[phone]  
PATIENT DISCHARGE   
INSTRUCTIONS  
Patient Information  
Name: MICHI TOLENTINO KWAN Age:   
28 Years  
YOB: 1995  
MRN: [id number] FIN:   
[id number]  
Reason For Visit:   
Abscess - axilla;   
UNDER ARM RASH,   
PAIN, DISCHARGE  
Arrival Time:   
2023 09:28:18  
Phone: [phone]  
Primary Care   
Physician: EFFIE FAM  
Attending Physician:   
Buzz Diaz  
Comment:  
Patient Education  
With: Address: When:  
EFFIE FAM [address] [place]   
[address] [phone]   
Within 3 to 5 days  
Comments:  
Diagnosis is tinea   
corporis or ringworm   
in the left axillary   
region. You have had   
this for couple   
weeks, and as   
discussed this is a   
fungal type   
infection, and can   
take sometimes over   
2 weeks to resolve   
with treatment. Will   
provide new topical   
cream apply as   
prescribed.   
Follow-up with your   
own primary care   
provider next 3 to 5   
days for   
reevaluation. Return   
for worsening   
symptoms or   
concerns.  
Body Ringworm  
Body ringworm is an   
infection of the   
skin that often   
causes a ring-shaped   
rash. Body ringworm   
is also called tinea   
corporis.  
Body ringworm can   
affect any part of   
your skin. This   
condition is easily   
spread from person   
to person (is very   
contagious).  
What are the causes?  
This condition is   
caused by fungi   
called   
dermatophytes. The   
condition develops   
when these fungi   
grow out of control   
on the skin.  
You can get this   
condition if you   
touch a person or   
animal that has it.   
You can also get it   
if you share any   
items with an   
infected person or   
pet. These include:  
? Clothing, bedding,   
and towels.  
? Brushes or garcia.  
? Gym equipment.  
? Any other object   
that has the fungus   
on it.  
What increases the   
risk?  
You are more likely   
to develop this   
condition if you:  
? Play sports that   
involve close   
physical contact,   
such as wrestling.  
? Sweat a lot.  
? Live in areas that   
are hot and humid.  
? Use public   
showers.  
? Have a weakened   
immune system.  
What are the signs   
or symptoms?  
(Inserted Image.   
Unable to display)  
Symptoms of this   
condition include:  
? Itchy, raised red   
spots and bumps.  
? Red scaly patches.  
? A ring-shaped   
rash. The rash may   
have:  
? A clear center.  
? Scales or red   
bumps at its center.  
? Redness near its   
borders.  
? Dry and scaly skin   
on or around it.  
How is this   
diagnosed?  
This condition can   
usually be diagnosed   
with a skin exam. A   
skin scraping may be   
taken from the   
affected area and   
examined under a   
microscope to see if   
the fungus is   
present.  
How is this treated?  
This condition may   
be treated with:  
? An antifungal   
cream or ointment.  
? An antifungal   
shampoo.  
? Antifungal   
medicines. These may   
be prescribed if   
your ringworm:  
? Is severe.  
? Keeps coming back.  
? Lasts a long time.  
Follow these   
instructions at   
home:  
? Take   
over-the-counter and   
prescription   
medicines only as   
told by your health   
care provider.  
? If you were given   
an antifungal cream   
or ointment:  
? Use it as told by   
your health care   
provider.  
? Wash the infected   
area and dry it   
completely before   
applying the cream   
or ointment.  
? If you were given   
an antifungal   
shampoo:  
? Use it as told by   
your health care   
provider.  
? Leave the shampoo   
on your body for 3?5   
minutes before   
rinsing.  
? While you have a   
rash:  
? Wear loose   
clothing to stop   
clothes from rubbing   
and irritating it.  
? Wash or change   
your bed sheets   
every night.  
? Disinfect or throw   
out items that may   
be infected.  
? Wash clothes and   
bed sheets in hot   
water.  
? Wash your hands   
often with soap and   
water. If soap and   
water are not   
available, use hand   
.  
? If your pet has   
the same infection,   
take your pet to see   
a  for   
treatment.  
How is this   
prevented?  
? Take a bath or   
shower every day and   
after every time you   
work out or play   
sports.  
? Dry your skin   
completely after   
bathing.  
? Wear sandals or   
shoes in public   
places and showers.  
? Change your   
clothes every day.  
? Wash athletic   
clothes after each   
use.  
? Do not share   
personal items with   
others.  
? Avoid touching red   
patches of skin on   
other people.  
? Avoid touching   
pets that have bald   
spots.  
? If you touch an   
animal that has a   
bald spot, wash your   
hands.  
Contact a health   
care provider if:  
? Your rash   
continues to spread   
after 7 days of   
treatment.  
? Your rash is not   
gone in 4 weeks.  
? The area around   
your rash gets red,   
warm, tender, and   
swollen.  
Summary  
? Body ringworm is   
an infection of the   
skin that often   
causes a ring-shaped   
rash.  
? This condition is   
easily spread from   
person to person (is   
very contagious).  
? This condition may   
be treated with   
antifungal cream or   
ointment, antifungal   
shampoo, or   
antifungal   
medicines.  
? Take   
over-the-counter and   
prescription   
medicines only as   
told by your health   
care provider.  
This information is   
not intended to   
replace advice given   
to you by your   
health care   
provider. Make sure   
you discuss any   
questions you have   
with your health   
care provider. (more   
content not   
included)...        Normal                                  [facility]  
   
                                                    Urgent Care [place] -2  
023   
   
                                        Urgent Care Record  [facility] ?   
Urgent Care  
[address] [id number] [phone]  
PATIENT DISCHARGE   
INSTRUCTIONS  
Patient Information  
Name: MICHI TOLENTINO Age:   
28 Years  
YOB: 1995  
MRN: [id number] FIN:   
[id number]  
Reason For Visit:   
Abscess - axilla;   
UNDER ARM RASH,   
PAIN, DISCHARGE  
Arrival Time:   
2023 09:28:18  
Phone: [phone]  
Primary Care   
Physician: EFFIE FAM  
Attending Physician:   
Buzz Diaz  
Comment:  
Visit Diagnosis:  
Diagnoses This Visit  
Abscess - axilla   
([id number]  
[id number])  
Tinea corporis   
(B35.4)  
If you received any   
narcotics, sedation,   
or any other   
medication that   
causes drowsiness   
for the next 24   
hours, unless   
otherwise directed:  
? Do not drive a   
car.  
? Do not operate   
machinery such as   
power tools, lawn   
mowers, drills,   
sewing machines, or   
stoves  
? Avoid alcoholic   
beverages and drugs   
for allergies,   
nerves, or sleep  
? Do not make   
important personal   
or business   
decisions or sign   
any legal documents  
With: Address: When:  
EFFIE FAM [address] [place] [address] [address] [id number] [phone]   
Within 3 to 5 days  
Comments:  
Diagnosis is tinea   
corporis or ringworm   
in the left axillary   
region. You have had   
this for couple   
weeks, and as   
discussed this is a   
fungal type   
infection, and can   
take sometimes over   
2 weeks to resolve   
with treatment. Will   
provide new topical   
cream apply as   
prescribed.   
Follow-up with your   
own primary care   
provider next 3 to 5   
days for   
reevaluation. Return   
for worsening   
symptoms or   
concerns.  
Medication   
Information:  
The exam and   
treatment you   
received today in   
the [facility] Urgent   
Care were for an   
urgent problem and   
are not intended as   
complete care. It is   
important for you to   
follow up with a   
doctor, nurse   
practitioner, or   
physician?s   
assistant for   
ongoing care. If   
your symptoms become   
worse or you do not   
improve as expected   
and you are unable   
to reach your usual   
health care   
provider, you should   
return to the   
Emergency   
Department, we are   
available 24 hours a   
day.  
For those patients   
who have received   
Radiology results,   
the interpretation   
of your X-ray as   
given to you by our   
Urgent Care   
physician is only a   
preliminary report.   
The Radiologist will   
review your films   
and if there is a   
change in the   
diagnosis you will   
be notified by   
phone. Please make   
sure you have   
provided a working   
phone number so we   
can reach you if   
necessary.  
In the event that   
you had a lab   
culture while you   
were a patient in   
the Urgent Care, you   
will be notified by   
phone if there is a   
need to change your   
antibiotic. Please   
make sure you have   
provided a working   
phone number so we   
can reach you if   
necessary.  
[facility]   
Urgent Care has   
provided you with a   
complete list of   
medications post   
discharge. Please   
inform your primary   
care MD/provider of   
your visit and for   
further instruction   
on these   
medications. Any   
specific questions   
regarding your   
chronic medications   
and dosages should   
be discussed with   
your primary care   
physician(s) and/or   
pharmacist.  
New Medications  
[id number],   
[address], [place]   
[id number], [phone]  
clotrimazole topical   
(clotrimazole 1%   
topical cream) 1 mara   
Topical (on the   
skin) 2 times a day   
(scheduled) for 14   
Days. Refills: 0.  
Additional   
medications on your   
home medication list   
not specifically   
addressed. Please   
contact the ordering   
physician if you   
have questions about   
these medications.  
busPIRone (busPIRone   
10 mg oral tablet)  
carisoprodol   
(carisoprodol 350 mg   
oral tablet) 1   
tab(s) Oral (given   
by mouth) 3 times a   
day (scheduled).   
take UP TO 1 tablet   
by mouth three times   
a day if needed.  
diclofenac   
(diclofenac   
potassium 50 mg oral   
tablet) take 1   
tablet by mouth   
three times a day if   
needed.  
diclofenac   
(diclofenac   
potassium 50 mg oral   
tablet)  
DULoxetine   
(DULoxetine 60 mg   
oral delayed release   
capsule)  
gabapentin   
(gabapentin 800 mg   
oral tablet)  
lamoTRIgine   
(lamoTRIgine 25 mg   
oral tablet)  
methylPREDNISolone   
(MethylPREDNISolone   
Dose Pack 4 mg oral   
tablet) 1 packet(s)   
Oral (given by   
mouth) once for 6   
Days. as directed on   
package labeling.  
oxyBUTYnin   
(oxyBUTYnin 5 mg   
oral tablet)  
pantoprazole   
(pantoprazole 40 mg   
oral delayed release   
tablet)  
pregabalin   
(pregabalin 150 mg   
oral capsule)  
propranolol   
(propranolol 10 mg   
oral tablet)  
QUEtiapine   
(QUEtiapine 200 mg   
oral tablet)  
tamsulosin   
(tamsulosin 0.4 mg   
oral capsule)  
tiZANidine   
(tiZANidine 4 mg   
oral tablet)  
topiramate   
(topiramate 100 mg   
oral tablet)  
traMADol (traMADol   
50 mg oral tablet) 1   
tab(s) Oral (given   
by mouth) every 4   
hours (scheduled) as   
needed as needed for   
pain.  
traZODone (traZODone   
100 mg oral tablet)  
Visit Information  
Allergies:  
Substance Reaction   
Symptoms Type   
Comments  
shellfish Food  
Vital Signs:  
Vitals and   
Measurements this   
Visit (last charted   
value for your   
2023 visit)  
Vital Signs This   
Visit  
Temperature Oral:   
36.8 DegC  
Peripheral Pulse   
Rate: 100 bpm  
Respiratory Rate: 18   
br/min  
Systolic Blood   
Pressure: 119 mmHg  
Diastolic Blood Pr   
(more content not   
included)...        Normal                                  [facility]  
   
                                                    Lab Reportson 2023   
   
                                        Lab Reports         [id number]  
[id number]  
#[id number]         Normal                                  [facility]  
   
                                                    Amphetamine Screen Ql (U)Ord  
ered By: ABIGAIL MCCLENDON on 2023   
   
                      Amphetamines Ql (U) Negative              Negative   [facility]  
   
                                                    Barbiturates [Presence] in U  
rine by Screen methodOrdered By: ABIGAIL MCCLENDON on   
2023   
   
                                                    Barbiturates Screen Ql   
(U)             Negative                        Negative        [facility]  
   
                                                    Benzodiazepines Screen Ql (U  
)Ordered By: ABIGAIL MCCLENDON on 2023   
   
                      Benzodiazepines Ql (U) Negative              Negative   [facility]  
   
                                                    Benzoylecgonine [Presence] i  
n Urine by Screen methodOrdered By: ABIGAIL MCCLENDON on  
   
2023   
   
                                                    Benzoylecgonine Screen   
Ql (U)          Negative                        Negative        [facility]  
   
                                                    Cannabinoids [Presence] in U  
rine by Screen methodOrdered By: ABIGAIL MCCLENDON on   
2023   
   
                                                    Cannabinoids Screen Ql   
(U)             Positive                        Negative        [facility]  
   
                                        Comment on above:   These are unconfirme  
d results and should not be used for legal  
   
purposes. Drug Cut-Off Concentration: AMPH 1000 ng/mL ABEBE 200   
ng/mL SHAWN 200 ng/mL COCM 300 ng/mL  ng/mL PCP 25 ng/mL   
THC 20 ng/mL   
   
                                                    Lab Reportson 2023   
   
                                        Lab Reports         [id number]  
[id number]  
#[id number]         Normal                                  [facility]  
   
                                                    Opiates [Presence] in Urine   
by Screen methodOrdered By: ABIGAIL MCCLENDON on   
2023   
   
                      Opiates Screen Ql (U) Negative              Negative   [facility]  
   
                                                    Phencyclidine Screen Ql (U)O  
rdered By: ABIGAIL MCCLENDON on 2023   
   
                      Phencyclidine Ql (U) Negative              Negative   [facility]  
   
                                                    Patient Educationon 20   
   
                                        Patient Education   Urology  
Hypogonadism, Male  
(Inserted Image.   
Unable to display)  
Male hypogonadism is   
a condition of   
having a level of   
testosterone that is   
lower than normal.   
Testosterone is a   
chemical, or   
hormone, that is   
made mainly in the   
testicles.  
In boys,   
testosterone is   
responsible for the   
development of male   
characteristics   
during puberty.   
These include:  
? Making the penis   
bigger.  
? Growing and   
building the   
muscles.  
? Growing facial   
hair.  
? Deepening the   
voice.  
In adult men,   
testosterone is   
responsible for   
maintaining:  
? An interest in sex   
and the ability to   
have sex.  
? Muscle mass.  
? Sperm production.  
? Red blood cell   
production.  
? Bone strength.  
Testosterone also   
gives men energy and   
a sense of   
well-being.  
Testosterone   
normally decreases   
as men age and the   
testicles make less   
testosterone.   
Testosterone levels   
can vary from man to   
man. Not all men   
will have signs and   
symptoms of low   
testosterone.   
Weight, alcohol use,   
medicines, and   
certain medical   
conditions can   
affect a man's   
testosterone level.  
What are the causes?  
This condition is   
caused by:  
? A natural decrease   
in testosterone that   
occurs as a man   
grows older. This is   
the main cause of   
this condition.  
? Use of medicines,   
such as   
antidepressants,   
steroids, and   
opioids.  
? Diseases and   
conditions that   
affect the testicles   
or the making of   
testosterone. These   
include:  
? Injury or damage   
to the testicles   
from trauma, cancer,   
cancer treatment, or   
infection.  
? Diabetes.  
? Sleep apnea.  
? Genetic conditions   
that men are born   
with.  
? Disease of the   
pituitary gland.   
This gland is in the   
brain. It produces   
hormones.  
? Obesity.  
? Metabolic   
syndrome. This is a   
group of diseases   
that affect blood   
pressure, blood   
sugar, cholesterol,   
and belly fat.  
? HIV or AIDS.  
? Alcohol abuse.  
? Kidney failure.  
? Other long-term or   
chronic diseases.  
What are the signs   
or symptoms?  
Common symptoms of   
this condition   
include:  
? Loss of interest   
in sex (low sex   
drive).  
? Inability to have   
or maintain an   
erection (erectile   
dysfunction).  
? Feeling tired   
(fatigue).  
? Mood changes, like   
irritability or   
depression.  
? Loss of muscle and   
body hair.  
? Infertility.  
? Large breasts.  
? Weight gain   
(obesity).  
How is this   
diagnosed?  
Your health care   
provider can   
diagnose   
hypogonadism based   
on:  
? Your signs and   
symptoms.  
? A physical exam to   
check your   
testosterone levels.   
This includes blood   
tests. Testosterone   
levels can change   
throughout the day.   
Levels are highest   
in the morning. You   
may need to have   
repeat blood tests   
before getting a   
diagnosis of   
hypogonadism.  
Depending on your   
medical history and   
test results, your   
health care provider   
may also do other   
tests to find the   
cause of low   
testosterone.  
How is this treated?  
This condition is   
treated with   
testosterone   
replacement therapy.   
Testosterone can be   
given by:  
? Injection or   
through pellets   
inserted under the   
skin.  
? Gels or patches   
placed on the skin   
or in the mouth.  
Testosterone therapy   
is not for everyone.   
It has risks and   
side effects. Your   
health care provider   
will consider your   
medical history,   
your risk for   
prostate cancer,   
your age, and your   
symptoms before   
putting you on   
testosterone   
replacement therapy.  
Follow these   
instructions at   
home:  
? Take   
over-the-counter and   
prescription   
medicines only as   
told by your health   
care provider.  
? Eat foods that are   
high in fiber, such   
as beans, whole   
grains, and fresh   
fruits and   
vegetables. Limit   
foods that are high   
in fat and processed   
sugars, such as   
fried or sweet   
foods.  
? If you drink   
alcohol:  
? Limit how much you   
have to 0?2 drinks a   
day.  
? Know how much   
alcohol is in your   
drink. In the [place]   
one drink equals one   
12 oz bottle of beer   
(355 mL), one 5 oz   
glass of wine (148   
mL), or one 1? oz   
glass of hard liquor   
(44 mL).  
? Return to your   
normal activities as   
told by your health   
care provider. Ask   
your health care   
provider what   
activities are safe   
for you.  
? Keep all follow-up   
visits. This is   
important.  
Contact a health   
care provider if:  
? You have any of   
the signs or   
symptoms of low   
testosterone.  
? You have any side   
effects from   
testosterone   
therapy.  
Summary  
? Male hypogonadism   
is a condition of   
having a level of   
testosterone that is   
lower than normal.  
? The natural drop   
in testosterone   
production that   
occurs with age is   
the most common   
cause of this   
condition.  
? Low testosterone   
can also be caused   
by many diseases and   
conditions that   
affect the testicles   
and the making of   
testosterone.  
? This condition is   
treated with   
testosterone   
replacement therapy.  
? There are risks   
and side effects of   
testosterone   
therapy. Your health   
care provider will   
consider your age,   
medical history,   
symptoms, and risks   
for prostate cancer   
before putting you   
on testosterone   
therapy.  
This information is   
not intended to   
replace advice given   
to you by your   
health care   
provider. Make sure   
you discuss any   
questions you have   
with your health   
care provider.  
Document Revised:   
2021 Document   
(more content not   
included)...        Normal                                  [facility]  
   
                                                    Urology Office/Clinic Noteon  
 2023   
   
                                                    Urology Office/Clinic   
Note                                    Chief Complaint  
Incontinence  
HPI Staff  
KML pt  
6m Testosterone   
Level & Discuss   
Incontinence  
DX: Hypogonadotropic   
Hypogonadism, High   
serum T4, Frequency,   
Dysfunctional   
voiding of Urine &   
Post Void Dribbling  
*Clomiphene 25 mg   
QD, Tamsulosin 0.4mg   
QD therapy &   
Oxybutynin 15 mg ER   
qhs therapy  
Testosterone   
2/15/23- 264   
([id number])  
Testosterone Free is   
not yet finalized  
Pt is concerned with   
the incontinence at   
night time. At least   
3x/wk. Started back   
in , becoming   
worse.  
Does not get   
awakened with urge   
to void.  
Occasional dribbling   
during the day.  
Occasional burning   
with urination, at   
the beginning.  
Can tell when he   
misses a dose of   
Oxybutynin.   
Increased Frequency   
& Urgency at that   
time.  
PVR 85ml  
History of Present   
Illness  
staff HPI reviewed   
and agree.  
Review of Systems  
PHQ Score  
Initial Depression   
Screen Score: 0  
no fever, chills,   
malaise, myalgia.  
no rash/lesions.  
no chest pain,   
palpitations, or   
SOB.  
no abdominal pain,   
nausea, vomiting.  
no unilateral calf   
swelling, redness,   
pain  
Physical Exam  
Vitals &   
Measurements  
HR: 68(Peripheral)   
RR: 16 BP: 124/86  
HT: 75 in HT: 190 cm   
WT: 165.9 kg WT:   
364.98 lb BMI: 45.96  
General: nontoxic,   
NAD  
Mouth: moist mucosa  
Lungs: normal   
respiratory effort  
Cardio: regular   
rate, good distal   
perfusion  
Abdomen:   
nondistended, no   
suprapubic   
distention or   
tenderness, no CVA   
tenderness  
Neurologic: Grossly   
normal  
Skin: No rashes or   
suspicious lesions  
Assessment/Plan  
1. Hypogonadism male   
(E29.1: Testicular   
hypofunction)  
Originally saw an   
outside provider for   
Low T. Sx included   
low energy, low   
libido, ED.  
Pt was intolerant on   
intranasal   
testosterone   
replacement. Pt had   
a reaction minutes   
after first   
testosterone   
injection.  
Also, Pt wishes to   
maintain fertility.  
Pt had brain MRI   
March of last year   
ordered by another   
provider which shows   
no tumors and nl   
pituitary.  
Testosterone   
(ng/dL):  
22 - 62  
23 - 1.07  
23 - LH 5.4.   
FSH 2.9. T 286   
([id number]). Free T   
11.3 (9.3-26.5)  
09/15/23 - 264   
([id number])  
Pt taking Clomiphene   
25 mg QD (50mg 1/2   
tab daily)  
Discussed T level   
results with pt,   
remains on the low   
end of normal range   
but better than   
prior to treatment.   
Pt states that he   
does feel like there   
are some sx   
improvements since   
starting the   
Clomiphene -   
strength, stamina,   
energy, mood. Pt is   
still on his psych   
meds, feels like he   
is better with his   
mental and physical   
health. Pt states   
that his libido has   
NOT improved at all   
and this is very   
bothersome to him.   
Discussed trying to   
increase the   
Clomiphene to a full   
dose, 50mg (pt has   
been taking half a   
tab). Advised pt   
that after a while   
of taking the full   
dose, if he does not   
notice a difference   
in his libido he is   
to go back to half a   
tab. He agrees with   
this plan.  
We are still waiting   
on the complete   
results of his   
Testosterone F&T.   
Will call pt with   
results per his   
request.  
-increase to   
Clomiphene 50mg QD   
(full tab).  
-Call pt with   
results of F&T   
portion.  
-Will order   
Testosterone F&T,   
FSH, and LH for next   
visit.  
2. Nocturnal   
enuresis (N39.44:   
Nocturnal enuresis)  
Pt is most concerned   
with the   
incontinence at   
night time. At least   
3x/wk. Started back   
in , becoming   
worse.  
Does not get   
awakened with urge   
to void. This is the   
most bothersome.  
Discussed adding an   
extra med for the pt   
to take at bedtime   
to layer on to what   
he's taking for 24hr   
control. Pt states   
he would like this.   
Understands this is   
off-label and higher   
risk for side   
effects. Advised pt   
to call our office   
if he does not see   
an improvement.  
-Will add Oxybutynin   
5mg IR at Bedtime.  
3. Frequency of   
urination (R35.0:   
Frequency of   
micturition)  
Taking Oxybutynin 15   
mg ER qd.  
Random Bladder Scan   
85  
Can tell when he   
misses a dose of   
Oxybutynin.   
Increased Frequency   
& Urgency at that   
time. Thinks it's   
helping a lot with   
his sx. No   
intolerable side   
effects.  
-Cont Oxybutynin 15   
ER QD.  
4. Dysfunctional   
voiding of urine   
(N39.8: Other   
specified disorders   
of urinary system)  
UDS 22 -   
delayed sensation,   
large bladder   
capacity 870 mL,   
normal max detrusor   
pressure, EMG   
activity when given   
permission to void   
which quiets once   
flow starts.   
Staccato shaped bell   
curve. Sphincter   
squeezing   
inappropriately when   
trying to void on   
cystoscopy.  
Taking Tamsulosin   
0.4 mg qd. No   
bothersome SE from   
Tamsulosin. Feels   
his flow is better   
and post void   
dribbling has   
resolved.  
-Cont Tamsulosin.  
Follow up in 6 mos.   
All   
questions/concerns   
were discussed. Pt   
to call the office   
if he encounters any   
issues prior. Pt   
acknowledges   
understanding.  
Follow-up  
With When Contact   
Information  
LIAN GALLAGHER PA-C, URL In 6 months   
[address] [place]   
[id number]  
Additional   
Instructions: w/T   
F&T, FSH, and LH  
Patient Education  
Hypogonadism, Male  
I, Nola Bowen,   
personally scribed   
for Lian Gallagher PA-C on 2023   
14:06:56.   
Electronically   
signed by isabella Bowen on   
2023 14:06:56.  
Documentation   
recorded by the   
scribDemetrio fanga   
(more content not   
included)...        [facility]  
   
                                        Comment on above:   Result Comment: Elec  
tronically Signed By: LIAN GALLAGHER PA-C\.br\Date and Time Signed: 23 14:20   
EDT\.br\Electronically Co-Signed By: Nola Bowen\.br\Date and Time Co-Signed: 23 14:07 EDT   
   
                                                    Outside Records Officeon    
   
                                        Outside Records Office [phone].  
090  
[id number]  
#1.00CD:127         [facility] 2023   
   
                                        CNPN                Telephone (NIQ)  
--------------------  
MICHI TOLENTINO   
([id number])   
1995 M  
Date Time Provider   
Department  
23 [address]AB[address] CASTILLO  
During your visit   
today, we recorded   
the following   
information about   
you:  
Sola Bakerbbins   
2023 11:50 AM   
Signed  
Patient is calling   
in stating that his   
MRI was denied. He   
would like to speak  
with the nurse  
Ph. [phone]  
Sky Magana RN   
2023 12:55 PM   
Signed  
Ne[facility] SPINE CARE   
COORDINATION QUICK   
NOTE  
Spoke with pt who   
reported insurance   
has not approved   
MRI, pt scheduled   
MRI at  
[facility], pt   
reports [facility] will   
not do prior auth,   
pt will schedule  
MRI at  facility,   
scheduling number   
provided to pt.  
Allergies As of   
Date: 2023   
Noted Allergy   
Reaction  
SHELLFISH CONTAINING   
PRODUCTS 2023   
9 - Itching  
Date Reviewed:   
2023  
Reviewed by: Jess Zambrano MA - Fully   
Assessed  
Reason for Visit:  
Information [[id number]]  
Prescriptions as of   
2023  
- topiramate   
(TOPAMAX) 100 mg   
tablet  
Take 100 mg by mouth   
twice daily.  
- DULoxetine   
(CYMBALTA) 60 mg   
capsule  
1 capsule.  
- tiZANidine   
(ZANAFLEX) 4 mg   
tablet  
take 1 tablet by   
mouth every 8 hours   
if needed for muscle   
spasm  
- gabapentin   
(NEURONTIN) 600 mg   
tablet  
take 1 tablet by   
mouth three times a   
day for 30 DAYS for   
30  
- QUEtiapine   
(SEROQUEL) 100 mg   
tablet  
Take 150 mg by mouth   
daily at bedtime.  
- tamsulosin   
(FLOMAX) 0.4 mg  
Take 0.4 mg by mouth   
once daily.  
- lamoTRIgine   
(LAMICTAL) 25 mg   
tablet  
Take 50 mg by mouth   
once daily.  
- oxybutynin ER   
(DITROPAN XL) 15 mg   
24 hr Extended Rel   
Tab  
Take 15 mg by mouth   
once daily.  
- pantoprazole DR   
(PROTONIX) 40 mg   
tablet  
take 1 tablet by   
mouth once daily for   
30  
- clomiPHENe   
(SEROPHENE) 50 mg   
tablet  
Take 25 mg by mouth.  
- diclofenac   
potassium (CATAFLAM)   
50 mg tablet  
1 tablet Orally 3   
times a day as   
needed  
- NURTEC ODT 75 mg   
disintegrating   
tablet  
take 1 tablet by   
mouth AT ONSET OF   
MIGRAINE FOR 30 DAYS  
Problem List As Of   
Date: 2023  
(None)  
Encounter Number:   
[id number]  
Encounter   
Status:Closed by   
SKY MAGANA on   
23             [facility]  
   
                                                    CNOVon 2023   
   
                                        CNOV                Office Visit   
(SPNSMN)  
--------------------  
MICHI TOLENTINO   
([id number])   
1995 M  
Date Time Provider   
Department  
23 3:00 PM   
BABAR CASTILLO   
SPNSMN  
During your visit   
today, we recorded   
the following   
information about   
you:  
Pulse Respiration   
Blood pressure   
Weight  
109/minute 18/minute   
124/88 165.9 kg  
Height  
1.905 m  
GLYNN GriffithCNP 5/10/2023   
10:26 AM Signed  
SPINE SURGERY NEW   
PATIENT  
This is an in person   
visit  
DATE OF SERVICE: May   
9, 2023  
This is an in-person   
visit.  
REFERRING PROVIDER:   
Dr. Effie Fam for Disc   
displacement, lumbar  
SUBJECTIVE  
HISTORY OF PRESENT   
ILLNESS:  
Michi Tolentino is   
a 27 year old male   
presenting with   
partner, Nicki.  
CHIEF COMPLAINT:   
pain  
2019? developed back   
issues after bending   
- developed acute   
onset of BLE  
numbness and   
inability to walk.  
He reports his   
girlfriend took him   
to the ED and was dc  
Had trouble getting   
MRI but finally did  
Had an EMG  
Told he needed   
surgery  
Pre-op sx back pain,   
leg pain and leg   
numbness  
Underwent right L4-5   
laminotomy and   
foraminotomies with   
decompression and  
discectomy on   
2021 at [facility]  
His girlfriend   
reports he was worse   
after surgery - a   
lot of back pain  
He reports numbness   
in the legs reduced;   
leg pain less after   
surgery  
He reports he used a   
brace 9 months post   
op  
He reports he has   
had a lot of PT and   
was told there was   
not much they could   
do  
for him  
Pool therapy was   
closed due to PT  
Had lumbar RFA   
2022 and he   
reports great   
success for his pain   
with this.  
Was working in a   
office 2022, bent   
to get something off   
the floor and  
developed worsening   
sx  
Reports worsening   
back and RLE pain  
Now having trouble   
lifting heavy items   
since  
Reports 2-3   
injections since   
with no benefit -   
pain worse the first   
week after  
MRI completed  
PAIN EVALUATION  
2023  
1459  
Pain Level: 7  
Pain Location:   
Back-Lower  
left hip, right leg,   
buttocks  
Description:   
Burning;Aching;Numbn  
ess;Stabbing;Tinglin  
g  
Duration Amount of   
Time: 3.5  
Duration Units:   
Years  
Frequency:   
Continuous  
Intervention/Comfort   
measure:   
Heat;Exercise;Cold;M  
edication;Relaxation  
Pain Radiation:   
midline low back,   
bilateral buttock,   
right posterolateral   
hip,  
right anterior thigh   
to the right lower   
leg to the top of   
the right foot and  
the great toe  
- denies left leg   
pain  
Aggravating Factors:   
Walking, lifting,   
prolonged sitting,   
prolonged sleeping,  
laying on the right   
side  
Alleviating Factors:   
Lying supine, cold  
Subjective weakness:   
yes - RLE  
Numbness/tingling:   
Yes - RLE  
Patient goals for   
visit today: wants a   
fix  
DERMATOMAL   
DISTRIBUTION:  
Right: L5  
AMBULATORY STATUS:   
Independent   
Community Distances   
- using a cane since   
2022,usingmore   
recently  
PREVIOUS   
CONSERVATIVE   
TREATMENTS:  
Muscle relaxer  
Oral steroids  
Membrane stabilizer:   
Gabapentin 600mg TID  
NSAIDS - diclofenac,   
aleve, ibuprofen  
Antidepressant:   
cymbalta  
Injections  
21 Bilateral L   
4/5 5/1 MBB  
21 Harish L 4/5   
5/1 MBB  
22 Bilateral   
L4/5, 5/1 MBB  
22 Right and   
22 Left L4/5,   
5/1 RFA - pt   
reported great   
benefit  
10/21/22 Right NRI   
L4, 5 - pt reported   
zero benefit  
2023 caudal   
Epidural Steroid   
Injection - pt   
reported zero   
benefit  
3/17/23 Right L2, 3   
NRI - pt reported   
zero benefit  
There is no problem   
list on file for   
this patient.  
PAST MEDICAL HISTORY  
Diagnosis Date  
Chronic back pain  
GERD   
(gastroesophageal   
reflux disease)  
Marijuana smoker,   
continuous  
Morbid obesity (HCC)  
Sleep apnea  
PAST SURGICAL   
HISTORY  
Procedure Laterality   
Date  
BACK SURGERY HX   
Right 2021  
right L4-5   
laminotomy and   
foraminotomies with   
decompression and   
discectomy on  
2021 at [facility]  
Social History  
Tobacco Use  
Smoking status:   
Former  
Types: Cigarettes  
Smokeless tobacco:   
Current  
Types: Chew  
Substance Use Topics  
Drug use: Yes  
Types: Marijuana  
ALLERGIES  
Allergen Reactions  
Shellfish Containin*   
Itching  
MEDICATIONS:  
topiramate (TOPAMAX)   
100 mg tablet Take   
100 mg by mouth   
twice daily.  
DULoxetine   
(CYMBALTA) 60 mg   
capsule 1 capsule.  
tiZANidine   
(ZANAFLEX) 4 mg   
tablet take 1 tablet   
by mouth every 8   
hours if  
needed for muscle   
spasm  
gabapentin   
(NEURONTIN) 600 mg   
tablet take 1 tablet   
by mouth three times   
a day  
for 30 DAYS for 30  
QUEtiapine   
(SEROQUEL) 100 mg   
tablet Take 150 mg   
by mouth daily at   
bedtime.  
tamsulosin (FLOMAX)   
0.4 mg Take 0.4 mg   
by mouth once daily.  
lamoTRIgine   
(LAMICTAL) 25 mg   
tablet Take 50 mg by   
mouth once daily.  
oxybutynin ER   
(DITROPAN XL) 15 mg   
24 hr Extended Rel   
Tab Take 15 mg by   
mouth  
once daily.  
pantoprazole DR   
(PROTONIX) 40 mg   
tablet take 1 tablet   
by mouth once daily   
for  
30  
clomiPHENe   
(SEROPHENE) 50 mg   
tablet Take 25 mg by   
mouth.  
diclofenac potassium   
(CATAFLAM) 50 mg   
tablet 1 tablet   
Orally 3 times a day   
as  
needed  
NURTEC ODT 75 mg   
disintegrating   
tablet take 1 tablet   
by mouth AT ONSET OF  
MIGRAINE FOR 30 DAYS  
iv contrast (will be   
provided with radio   
(more content not   
included)...        Normal                                  [facility]  
   
                                                    CT biopsyOrdered By: Reggie Gallagher on 2023   
   
                      Transferrin [Mass/Vol] 266 mg/dL             180-380    [facility]  
   
                                                    Iron [Mass/volume] in Serum   
or PlasmaOrdered By: Lian Gallagher on 2023   
   
                      Iron [Mass/Vol] 65 ug/dL                   [facility]  
   
                                                    Prolactin [Mass/volume] in S  
nitin or PlasmaOrdered By: Lian Gallagher on   
2023   
   
                      Prolactin [Mass/Vol] 3.35 ng/mL            2.64-13.13 [facility]  
   
                                                    Random cortisol measurementO  
rdered By: Lian Gallagher on 2023   
   
                      Cortisol [Mass/Vol] 4.7 ug/dL                        [facility]  
   
                                        Comment on above:   Reference range: AM   
6 - 24 ug/dl PM <10 ug/dl   
   
                                                    Serum or plasma thyroxine (T  
4) measurement (mass/volume)Ordered By: Lian Gallagher on   
2023   
   
                      T4 [Mass/Vol] 14.10 ug/dL            5.39-11.82 [facility]  
   
                                                    Testosterone [Mass/volume] i  
n Serum or PlasmaOrdered By: Lian Gallagher on   
2023   
   
                      Testosterone [Mass/Vol] 1.07 ng/mL            1.75-7.81  [facility]  
   
                                                    MRI LSPINE WO CONon 20   
   
                                        MRI LSPINE WO CON   EXAMINATION: MRI   
LSPINE WO CON  
HISTORY:   
Intervertebral disc   
prolapse ; chronic   
lumbar pain   
radiating into right  
leg  
COMPARISON: MRI   
lumbar spine   
2022  
TECHNIQUE: A variety   
of imaging planes   
and parameters were   
utilized for  
visualization of   
suspected pathology.  
FINDINGS:  
For the purposes of   
numbering, sagittal   
T2 image # 8 extends   
from the T12  
vertebral body   
superiorly to the S2   
level inferiorly.  
PARASPINAL AREA:   
Normal with no   
visible mass.  
BONES: No fracture,   
pars defect, or   
osseous lesion.  
CORD/CAUDA EQUINA:   
Normal caliber,   
contour, and signal   
intensity.  
DISC LEVELS:  
12-L1: No   
significant   
disc/facet   
abnormality, spinal   
stenosis, or   
foraminal  
stenosis.  
L1-L2: No   
significant   
disc/facet   
abnormality, spinal   
stenosis, or   
foraminal  
stenosis.  
L2-L3: Moderate   
central canal   
narrowing with trace   
amount of CSF   
surrounding the  
nerve roots. Minimal   
foramen narrowing   
bilaterally. Mild   
diffuse disc bulging  
with slight right   
paracentral   
broad-based   
protrusion. Mild   
disc desiccation  
without significant   
disc height   
reduction. Mild   
degenerative facet   
arthropathy  
bilaterally.  
L3-L4: No   
significant   
disc/facet   
abnormality, spinal   
stenosis, or   
foraminal  
stenosis.  
L4-L5: Mild foramen   
narrowing   
bilaterally without   
significant central   
canal  
narrowing. Mild   
diffuse disc bulging   
and small posterior   
broad-based disc  
protrusion. Minimal   
disc height   
reduction. Mild   
degenerative facet   
arthropathy  
bilaterally.  
L5-S1: Mild foramen   
narrowing   
bilaterally without   
significant central   
canal  
narrowing. Minimal   
diffuse disc bulging   
and mild bilateral   
facet arthropathy.  
IMPRESSION:  
1. L2-3 and grossly   
stable moderate   
central canal   
narrowing and mild   
bilateral  
foraminal narrowing   
secondary to mild   
diffuse disc bulging   
and mild right  
paracentral   
protrusion.  
2. L4-5 mild foramen   
narrowing   
bilaterally   
secondary to mild   
degenerative disc  
disease which   
appears to have   
progressed slightly.  
Electronically   
authenticated by:   
CHESTER NICK Date:   
2022 13:43    Normal                                  [facility]  
   
                                                    XR Knee Complete Left*on    
   
                                        XR Knee Complete Left* HISTORY: Fall  
  
COMPARISON: None   
available  
  
TECHNIQUE: AP,   
lateral, oblique,   
and sunrise views.  
  
  
FINDINGS:  
  
No acute fracture or   
dislocation. Joint   
spaces are   
maintained. No knee   
joint effusion.  
Soft tissues are   
within normal   
limits.  
  
  
IMPRESSION:  
  
No acute osseous   
abnormality.  
  
  
  
Report reported and   
signed by Omari Singh on 2022   
1122                Normal                                  [facility]  
   
                                                    MRI BRAIN WO W CONon   
022   
   
                                        MRI BRAIN WO W CON  EXAMINATION: MRI   
BRAIN WO W CON  
HISTORY: Refractory   
migraine without   
aura  
COMPARISON: No   
relevant comparison   
available.  
TECHNIQUE: A variety   
of imaging planes   
and parameters were   
utilized for  
visualization of   
suspected pathology.   
Images were   
performed with 20 ml   
Dotarem  
contrast.  
FINDINGS:  
CEREBRUM: No edema,   
hemorrhage, mass,   
acute infarction, or   
inappropriate  
atrophy.  
CEREBELLUM: No   
edema, hemorrhage,   
mass, acute   
infarction, or   
inappropriate  
atrophy.  
BRAINSTEM: No edema,   
hemorrhage, mass,   
acute infarction, or   
inappropriate  
atrophy.  
CSF SPACES:   
Ventricles,   
cisterns, and sulci   
are appropriate for   
age. No  
hydrocephalus,   
subarachnoid   
hemorrhage, or mass.  
SKULL: No mass or   
other significant   
visible lesion.  
SINUSES: Limited   
views demonstrate no   
significant mucosal   
thickening or fluid.  
ORBITS: Limited   
views are   
unremarkable.  
OTHER: No abnormal   
meningeal or   
parenchymal   
enhancement.   
Nonvisualization of   
the  
right vertebral   
artery  
IMPRESSION:  
No acute   
intracranial   
abnormality  
Electronically   
authenticated by:   
ABIGAIL FORD Date:   
2022 14:53    Normal                                  [facility]  
   
                                                    MRI LSPINE WO CONon 20  
22   
   
                                        MRI LSPINE WO CON   MRI LUMBAR SPINE   
WITHOUT CONTRAST:   
2022 8:59 AM   
EST  
History:Urinary   
incontinence  
Comparison:   
2020 .  
Sequences per   
routine unenhanced   
protocol.  
STUDY QUALITY: Good  
NUMBERING SCHEME:   
Lowest lumbar type   
vertebra is labeled   
L5  
OSSEOUS: Mild   
sessile endplate   
irregularities at   
several levels   
likely secondary  
to chronic Schmorl's   
nodes. No marrow   
edema pattern or   
compression   
deformity is  
evident.  
SPINAL CANAL SIZE:   
Developmentally is   
little less than   
average in size  
T11-T12: Exam in the   
sagittal plane only   
is negative  
T12-L1: No HNP or   
central stenosis.   
Slight facet   
overgrowths  
L1-2: No HNP or   
central stenosis.   
Mild facet   
elongation and   
overgrowths  
L2-3: . Some loss of   
water signal within   
the disc. Mild loss   
of disc space  
height. No change in   
these findings.   
Minimal   
retrolisthesis. Disc   
is mildly  
bulging. There is a   
mild sessile right   
paracentral disc   
protrusion which is  
unchanged. The   
posterior ligaments   
are not   
hypertrophied.   
Slight facet  
elongation and   
overgrowth. There is   
a mild acquired   
central stenosis   
which is  
unchanged.  
L3-4: No HNP or   
central stenosis.   
Mild facet   
elongation and   
slight overgrowth  
L4-5: Modest loss of   
disc space height   
posteriorly. Some   
loss of water signal  
within the disc.   
These findings are   
unchanged.  
The disc is fairly   
mildly bulging.   
There is mild to   
modest superimposed   
right  
paracentral disc   
protrusion which is   
thinner in AP   
dimension on the   
prior exam.  
Mild asymmetric   
deformity upon the   
right side of the   
sac is less apparent   
today.  
Interval partial   
right laminectomy   
and takedown right   
ligament. Probable  
granulation tissue   
right lateral to the   
sac. Posterior   
ligaments are not  
hypertrophied.   
Minimal facet   
overgrowth. Very   
mild central   
stenosis. L4   
foramina  
are mildly narrowed   
by disc bulge and   
facet orientation.  
L5-S1: Again,   
relatively mild   
retrolisthesis.   
Slight loss of disc   
space height  
posteriorly. No HNP   
or central stenosis.   
Neither S1 root   
sleeve is effaced or  
displaced. Facet   
joints are mildly   
elongated with mild   
overgrowth. L5   
foramina  
are again modestly   
narrowed by facet   
orientation with   
overgrowth, much   
greater  
than by any disc   
material.  
S1 foramina are   
widely patent  
Included aspects of   
the S1 foramina are   
unremarkable  
SPINAL CORD: No   
myelomalacia. No   
syrinx.  
CONAL TIP: At L1  
EXTRASPINAL SOFT   
TISSUES: No acute   
finding.  
OTHER: No acute   
finding  
IMPRESSION:  
1. Developmentally   
the lumbar spinal   
canal is little less   
than average in   
size.  
2. Mild somewhat   
sessile disc   
protrusion to the   
right at L2-L3 is   
unchanged.  
Mild central   
stenosis is again   
present at this   
level.  
3. Interval surgery   
on the right at   
L4-L5. On this   
unenhanced study   
suspect some  
residual or   
recurrent disc   
protrusion towards   
the right but this   
is smaller than  
on the prior study.   
A fairly mild   
central stenosis at   
this level.  
4. Facet orientation   
and overgrowths at   
several levels,   
greater than   
expected  
for age.  
5. See report for   
details at   
individual levels  
  
Electronically   
authenticated by:   
OMARI ARREDONDO Date:   
2022 10:08    Normal                                  [facility]  
   
                                                    XR Chest 2 Views*on 20  
22   
   
                                        XR Chest 2 Views*   CLINICAL HISTORY:   
Shortness of breath.   
Former smoker.   
History of Covid.  
  
COMPARISON: None.  
  
TECHNIQUE: Chest   
radiographs, PA and   
lateral  
  
RESULT:  
  
No consolidation. No   
pleural effusion. No   
pneumothorax. Normal   
pulmonary vascular  
pattern. Normal   
cardiomediastinal   
silhouette.] No   
acute osseous   
findings.  
  
IMPRESSION:  
No acute   
radiographic   
abnormality.  
  
  
  
  
  
Report reported and   
signed by Orestes Pro on   
2022 1332     Normal                                  [facility]   
Medical   
Specialist  
   
                                                    Initial Visit (Orthopaedic S  
[facility])on 2021   
   
                                                    Initial Visit   
(Orthopaedic Surgery)                   Diagnoses/Problems  
Assessed  
Failed back syndrome   
of lumbar spine   
(722.83) (M96.1)  
Chronic lumbar   
radiculopathy   
(724.4) (M54.16)  
Patient   
Discussion/Summary  
25-year-old man with   
a very unfortunate   
situation including   
intractable pain   
after a recent   
lumbar discectomy   
procedure. Given the   
fact that his   
symptoms began   
immediately after   
surgery, it is   
likely that there   
was some surgery   
related reason for   
the persistent   
irritated nerve   
symptoms. His   
follow-up MRI,   
however, does not   
show any   
reherniation or any   
obvious anatomic   
problem which should   
respond well to   
further surgery. As   
result, I would not   
recommend any   
further operation,   
as this will only   
likely increase his   
persistent and   
intractable   
dysesthetic nerve   
pain. I recommend   
further pain   
management as   
appropriate.  
The patient was in   
agreement with the   
plan. At the end of   
the visit today, the   
patient felt that   
all questions had   
been answered   
satisfactorily. The   
patient was pleased   
with the visit and   
very appreciative   
for the care   
rendered.  
Thank you very much   
for the kind   
referral. It is a   
privilege, and a   
pleasure, to partner   
with you in the care   
of your patients. I   
would be delighted   
to assist you with   
any further   
consultations as   
needed. Please feel   
free to have your   
patients refer to my   
website,   
[email or web address], for patient   
education materials   
regarding treatment   
options for common   
spinal conditions.  
  
Chief Complaint  
LUMBAR PAIN NPV  
  
Reference   
Documentation  
See scanned note   
Office Note: .  
  
History of Present   
Illness  
Michi is a   
25-year-old man who   
comes in today for a   
second opinion. He   
has been struggling   
with persistent   
intractable back   
pain and buttock   
pain since his   
recent surgery with   
Dr. Jonel Ellison   
at [facility].   
Prior to that   
operation he was   
having terrible leg   
pain. The patient   
does admit that his   
leg pain improved   
such that he was   
able to walk,   
however after   
surgery immediately   
when he woke up he   
noticed intractable   
pain in his but in   
his back. There was   
some questionable   
history as to   
whether or not there   
might have been an   
intraoperative dural   
defect. The patient   
is unsure. He denies   
any specific   
symptoms in regard   
to. Unfortunately   
these have persisted   
since his surgery   
greater than 6   
months ago. He has   
now been to close to   
10 physicians   
including other   
surgeons and pain   
management   
specialist to get   
further opinions   
about what to be   
done. His pain is so   
bad at the moment   
that he cannot   
return to work which   
she would very much   
like to do. He has a   
very difficult time   
sitting for any   
period of time.  
  
Review of Systems  
See patient history   
sheet  
  
Active Problems  
Problems  
Lower back pain   
(724.2) (M54.5)  
Physical Exam  
Physical Exam:  
Constitutional:   
Patient is   
well-appearing but   
obviously   
uncomfortable; he   
chooses to stand   
rather than sit  
Eyes: Pupils are   
equal and round  
Psych: Normal affect  
Cardiovascular:   
Regular rate and   
rhythm  
Abdomen:   
Nondistended  
Respiratory:   
Nonlabored breathing  
Musculoskeletal:[No   
pain with range of   
motion of the hips   
bilaterally]  
Neurologic: [4 out   
of 5 strength in   
lower extremities],   
[positive right   
straight leg raise],   
[no hyperreflexia],   
[no clonus]  
  
Results/Data  
Recent MRI of the   
lumbar spine from   
outside facility was   
personally reviewed.   
It does demonstrate   
prior right L4-5   
hemilaminotomy and   
discectomy. There   
appears to be scar   
tissue around the   
root but no obvious   
persistent   
compression of the   
root  
  
Signatures  
Electronically   
signed by : Moisés Kelsey MD; Sep 16   
2021 8:54AM EST   
(Author)            Normal                                  [facility]  
   
                                                    MRI LUMBAR SP W & WO CONTRAS  
Ton 2021   
   
                                                    MRI LUMBAR SP W & WO   
CONTRAST                                STUDY:  
MRI LUMBAR SP W WO   
CONTRAST; 2021   
10:41 am  
INDICATION:  
LUMBAR   
RADICULOPATHY.  
COMPARISON:  
MRI of lumbar spine   
from 2021  
ACCESSION NUMBER(S):  
[id number]  
ORDERING CLINICIAN:  
Gamaliel Kuhn  
TECHNIQUE:  
Sagittal and axial   
T1 and T2 weighted   
images of the lumbar   
spine were  
acquired. Sagittal   
STIR imaging was   
also performed.   
After  
administration of 15   
cc Gadavist,   
sagittal and axial   
T1 weighted  
imaging was also   
performed.  
FINDINGS:  
There is minimal   
retrolisthesis of L2   
over L3, L4 over L5   
and L5 over  
S1 levels.  
The vertebral bodies   
demonstrate expected   
height. The marrow   
signal  
is within normal   
limits. There is   
mild desiccation of   
several  
intervertebral discs   
with minimal loss of   
disc height at   
L4-L5.  
The lower thoracic   
cord is   
unremarkable. The   
conus terminates  
appropriately at   
lower L2-1.  
At L5-S1, there is   
circumferential disc   
bulge with bilateral   
facet  
hypertrophy. There   
is no central canal   
stenosis. There is   
mild  
bilateral neural   
foraminal narrowing   
as before.  
At L4-L5, there is   
evidence of   
right-sided   
laminotomy. There is   
a  
small amount of   
granulation tissue   
or scarring in the   
laminotomy bed  
and extending along   
the right lateral   
aspect of thecal sac   
into the  
region of right   
lateral recess. It   
appears to encase   
the right-sided  
L5 nerve root. There   
is no overall   
central canal   
stenosis. There is  
mild bilateral   
neural foraminal   
narrowing, left   
greater than right,  
similar to prior   
study.  
At L3-L4, there is   
circumferential disc   
bulge with bilateral   
facet  
hypertrophy. There   
is no central canal   
stenosis or neural   
foraminal  
narrowing.  
At L2-L3, there is   
posterior disc bulge   
with a superimposed   
right  
paracentral disc   
protrusion. There is   
bilateral facet and   
ligamentum  
flavum hypertrophy.   
There is mild   
central canal   
stenosis slightly  
asymmetric to the   
right with mild   
right neural   
foraminal narrowing.  
The overall findings   
are similar to prior   
study.  
At L1-L2, there is   
posterior disc bulge   
with bilateral facet   
and  
ligamentum flavum   
hypertrophy. There   
is no central canal   
stenosis.  
There is no neural   
foraminal narrowing.  
Mild edema is noted   
within the right   
posterior   
paraspinous soft  
tissues surrounding   
the laminotomy site   
at L4-L5. The   
prevertebral  
soft tissues and   
anterior paraspinous   
soft tissues are   
within normal  
limits.  
IMPRESSION:  
Postsurgical changes   
at L4-L5 with   
evidence of   
right-sided  
laminotomy. There is   
granulation tissue   
versus scarring   
along the  
right lateral aspect   
of thecal sac at   
this level which   
appears to  
encase the right   
traversing L5 nerve   
root.  
Mild lumbar   
spondylosis.        Normal                                  [facility]  
   
                                                    Chronic Pain Follow Upon    
   
                                        Chronic Pain Follow Up [facility]   
[facility]   
Name:   
MICHI TOLENTINO  
[address] [place] [address]   
MR#: [id number]  
[place]   
[address] ACCT:   
[id number]  
EVALUATION - Pain   
Management :   
94  
Service Date:   
21 0937  
Assessment and Plan   
[facility]  
Problem List  
1. Other   
intervertebral disc   
displacement, lumbar   
region  
M51.26 - Other   
intervertebral disc   
displacement, lumbar   
region  
2. Radiculopathy,   
lumbar region  
M54.16 -   
Radiculopathy,   
lumbar region  
Plan  
EMG demonstrated   
right L5   
radiculopathy. The   
patient's pattern of   
pain does not fit   
just  
an L5 radiculopathy.   
I suggested to the   
patient that he   
consider neurology   
evaluation.  
The patient states   
he has a second   
surgical opinion   
upcoming next week.   
I asked him to  
call our office if   
no further surgical   
indication is   
identified. At that   
time we will  
refer him for his   
neurologic   
consultation. There   
is nothing at this   
time that I would  
recommend from an   
interventional pain   
perspective. He is   
not a candidate for   
opioid  
therapy based on   
ongoing marijuana   
use and etiology of   
pain. The patient   
voiced  
understanding.  
History Present   
Illness PM  
Past Medical History  
Past Medical History  
Reports Psych   
Problems:, Reports   
GI Problems  
Diabetes: NO  
Other Medical Prob:   
HAS HAD EGD /   
COLONOSCOPY/BEATRICE   
AND ANXIETY  
Previous Surgeries:   
>20YRS AGO   
TONSILLECTOMY  
Have you received a   
COVID Vaccination?   
No  
HPI  
Chief Complaint:  
F/U DISCUSS EMG  
BACK PAIN RADIATED   
DOWN R LEG AND   
PELVIC AREA,SHOULDER   
PAIN  
Pain Location LOW   
BACK  
Radiates to RIGHT   
LEG TESTICLE  
Severity/Quality   
ELECTRICAL ICE PICK  
Associated Symptoms   
NUMBNESS IN   
LEGS/FEET, TINGLING   
IN LEGS/FEET  
Modifying Factors   
IMPROVED WITH ICE,   
UNCHANGED WITH HEAT  
History of Present   
Illness  
MICHI TOLENTINO is   
a 25 Male presenting   
for follow up   
regarding right leg   
pain that  
radiates into the   
abdomen, right   
groin, and buttocks.   
Persistent right leg   
pain. The  
patient is following   
up from EMG of the   
right lower   
extremity. Patient   
states that his  
pain remains severe.   
He has a second   
surgical opinion   
scheduled for next   
week.  
Dates for Last  
OARRS: 21  
SOAPP: 21  
SOAPP Score: 12  
PTA: 21  
Current Pain Scale:   
7  
In the past 2 weeks,   
have you: In the   
past 2 weeks, have   
you:  
Little   
interest/pleasure in   
doing things: 3   
NEARLY EVERY DAY  
Feeling   
down,depressed,hopel  
ess: 1 SEVERAL DAYS  
Total: 4  
How many times in   
the past year,   
5/more drinks per   
day? 0  
Alcohol Use: Yes  
Drug Use: Yes  
Drug Type: MARIJUANA  
Review of   
Systems/Physical PMC  
Review of Systems  
ROS  
The 12-system review   
of systems was   
completed by patient   
and any positives   
reported below.  
These were reviewed   
by DOUGLAS Hernandez MD.   
All other systems   
reported negative  
Skin NO COMPLAINTS  
Neurologic NUMBNESS   
LEGS  
Respiratory NO   
COMPLAINT  
GastroIntestinal   
ABDOMINAL PAIN,   
BLOOD IN STOOL,   
CONSTIPATION,   
DIARRHEA,   
GERD/REFLUX  
Urinary NO   
COMPLAINTS  
Musculoskeletal   
JOINT PAIN, JOINT   
STIFFNESS, SPASM   
LEGS AND BACK  
Endocrine NO   
COMPLAINTS  
Hematologic NO   
COMPLAINTS  
Psychiatric: NO   
COMPLAINT  
Physical Exam  
Physical  
Vital Signs Initial   
Set Verdana 4d  
Result Date Time  
Temp 98.4 908  
Pulse Ox 97 911  
B/P 146/85 911  
Pulse 113 911  
Height (cm): 190.50   
Weight (kg): 158.761   
BMI: 43.7  
Constitutional:  
No acute distress.   
Alert and oriented   
x3. Well-nourished.   
Well-developed.  
Musculoskeletal:  
Patient moves the   
right lower   
extremity without   
apparent difficulty.  
EMG 2021: Mild   
chronic denervation   
injury to the right   
L5 nerve root. No   
evidence of  
peripheral   
neuropathy. The   
right L2-4 and S1   
nerve roots are   
fully viable. Left   
L2-S1  
roots are fully   
viable. No focal   
entrapment to the   
major nerve tracks   
in the lower limbs.  
Allergies/Home   
Medications  
Allergies  
Coded Allergies:  
NO KNOWN ALLERGENS   
(20)  
Reconcile   
Medications  
Scheduled   
Medications  
Diclofenac Sodium *   
(Voltaren *) 25 MG   
TABLET.DR 25 MG PO   
TID, Ref 0   
(Reported)  
? DOSE  
Entered as Reported   
by ETIENNE GUEVARA on   
21 1216  
Last Action:   
Reviewed on 21 by CORRINE HOPPER  
Gabapentin *   
(Neurontin 300mg   
Capsule*) 300 MG   
CAPSULE 300 MG PO   
TID, Ref 0   
(Reported)  
Entered as Reported   
by CORRINE HOPPER on   
21 1520  
Last Action:   
Reviewed on 21 by CORRINE HOPPER  
traMADol HCl *   
(Ultram 50mg   
Tablet*) 50 MG   
TABLET 50 MG PO TID,   
Ref 0 (Reported)  
Entered as Reported   
by CORRINE HOPPER on   
21  
Last Action: New on   
21 by   
CORRINE HOPPER  
Scheduled PRN   
Medications  
Carisoprodol 350 MG   
TABLET 350 MG PO   
TIDPRN PRN Spasms 10   
Days #30 TABLET, Ref   
0  
Prescribed by   
WESLEY CORREA on   
21  
Last Action:   
Reviewed on 21 by CORRINE HOPPER  
Discontinued   
Medications  
Ibuprofen * (Motrin   
*) 600 MG TABLET 600   
MG PO Q6PRN PRN   
Pain, Ref 0   
(Reported)  
Discontinued reason:   
DC'ed by Physician  
Last Action:   
Discontinued on   
21 by   
CORRINE HOPPER  
Electronically   
Signed eSign Date   
and Time  
David (more   
content not   
included)...        Normal                                  [facility]  
   
                                                    Chronic Pain Evaluationon    
   
                                        Chronic Pain Evaluation [facility]   
[facility]   
Name:   
MICHI TOLENTINO  
[address] [place] [address]   
MR#: [id number]  
[place] [id number] ACCT:   
[id number]  
FOLLOW UP - Pain   
Management :   
94  
Service Date:   
210  
Assessment and Plan   
[facility]  
Problem List  
1. Radiculopathy,   
lumbar region  
M54.16 -   
Radiculopathy,   
lumbar region  
2. Other   
intervertebral disc   
displacement, lumbar   
region  
M51.26 - Other   
intervertebral disc   
displacement, lumbar   
region  
Plan  
Severe low back and   
leg pain consistent   
with lumbar   
radiculopathy. His   
symptoms are out  
of proportion to the   
MRI findings. I am   
concerned that there   
possibly other   
neurologic  
issues contributing   
to the patient's   
condition. I   
recommend bilateral   
lower extremity  
EMGs for further   
evaluation. The   
patient agrees. To   
follow-up after EMG   
is complete.  
Not a candidate for   
opioid therapy due   
to ongoing marijuana   
use. Continue   
gabapentin and  
ibuprofen. Patient   
agrees with plan of   
care.  
History Present   
Illness [facility]  
Past Medical History  
Past Medical History  
Reports Psych   
Problems:, Reports   
GI Problems  
Diabetes: NO  
Other Medical Prob:   
HAS HAD EGD /   
COLONOSCOPY/BEATRICE   
AND ANXIETY  
Previous Surgeries:   
>20YRS AGO   
TONSILLECTOMY  
Have you received a   
COVID Vaccination?   
No  
Dates for Last  
OARRS: 21  
SOAPP: 21  
SOAPP Score: 12  
PTA: 21  
Current Pain Scale:   
8  
. In the past 2   
weeks, have you:  
Little   
interest/pleasure in   
doing things: 0 NOT   
AT ALL  
Feeling   
down,depressed,hopel  
ess: 0 NOT AT ALL  
Total: 0  
Treatments/Meds   
Tried  
Medications Tried:  
SOMA  
GABAPENTIN  
HYDROCODONE  
HYDROCODONE/ACETAMIN  
OPHEN  
Past Social History  
Alcohol Use Yes  
How many times in   
the past year,   
5/more drinks per   
day? 0  
Drug Use Yes  
Drug Type MARIJUANA  
HPI  
Worst pain   
experienced, leading   
you to seek care? 10  
Chief Complaint:  
BACK PAIN RADIATED   
DOWN R LEG AND   
PELVIC AREA  
Pain Location LOW   
BACK  
Radiates to RIGHT   
LEG TESTICLE  
Severity/Quality   
ELECTRICAL ICE PICK  
Associated Symptoms   
NUMBNESS IN   
LEGS/FEET, TINGLING   
IN LEGS/FEET  
Modifying Factors   
IMPROVED WITH ICE,   
UNCHANGED WITH HEAT  
History of Present   
Illness  
MICHI TOLENTINO is   
a [birth year]25 Male and   
presents to the Pain   
Clinic with a chief   
complaint of  
right low back pain   
that radiates to the   
posterior lateral   
aspect of the right   
leg and  
right testicle.   
History of over 1   
year of severe low   
back pain. Had an   
epidural steroid  
injection with some   
urinary issues. That   
led to Dr. Santos   
recommending   
surgical  
intervention.   
Patient underwent   
right L4-5   
laminotomy and   
foraminotomies with  
decompression and   
discectomy on   
2021. The   
patient has had   
severe right low   
back pain  
following the   
surgery. The patient   
underwent right L4-5   
transforaminal   
epidural steroid  
injection with 80 mg   
of Depo-Medrol on   
3/26/2021. Directly   
following the   
procedure the  
patient experiences   
severe exacerbation   
of his pain in the   
typical distribution   
he  
experiences it. The   
exacerbation lasted   
for 4 days. Let the   
patient be seen in   
the  
emergency room and   
receiving updated   
MRI of the lumbar   
spine. The patient   
was referred  
here to discuss   
further treatment   
options. Any sort of   
movement exacerbates   
his pain.  
Denies loss of bowel   
or bladder function,   
fevers chills. He is   
unstable on his   
feet. He  
has put off physical   
therapy due to the   
pain.  
Review of   
Systems/Physical PMC  
Review of Systems  
ROS  
The 12-system review   
of systems was   
completed by patient   
and any positives   
reported below.  
These were reviewed   
by DOUGLAS Hernandez MD.   
All other systems   
reported negative  
Skin NO COMPLAINTS  
Neurologic NUMBNESS   
LEGS  
Respiratory NO   
COMPLAINT  
GastroIntestinal   
ABDOMINAL PAIN,   
BLOOD IN STOOL,   
CONSTIPATION,   
DIARRHEA,   
GERD/REFLUX  
Urinary NO   
COMPLAINTS  
Musculoskeletal   
JOINT PAIN, JOINT   
STIFFNESS, SPASM   
LEGS AND BACK  
Endocrine NO   
COMPLAINTS  
Hematologic NO   
COMPLAINTS  
Psychiatric NO   
COMPLAINT  
Physical Exam  
Physical  
Vital Signs Initial   
Set Verdana 4d  
Result Date Time  
Temp 98.4  1506  
Height (cm): 190.50   
Weight (kg): 158.761   
BMI: 43.7  
Constitutional:  
No acute distress.   
Well nourished. Well   
developed.  
Eyes:  
No exudate or   
deformity.  
Extraocular muscles   
intact.  
Ears, Nose, Mouth   
and Throat:  
Ears and nose are   
without deformity.  
Wears a mask.  
Neck:  
No noticeable   
masses, tracheal   
deviation or   
asymmetry.  
No thyromegaly on   
inspection.  
Respiratory:  
No gasping or   
shortness of breath.  
No accessory muscle   
use.  
Cardiovascular:  
Pulses in   
extremities   
palpable.  
No noticeable lower   
extremity edema or   
varicosities.  
Gastrointestinal   
(Abdomen):  
No distention.  
No pain with   
palpitation.  
Lymphatic:  
No supraclavicular   
or cervical   
lymphadenopathy.  
Skin:  
Inspection of skin   
reveals no rashed,   
lesions or ulcers.  
Normal skin turgor.  
Psychiatric:  
Oriented to time,   
place and person.  
Normal mood and   
affect.  
Neurologic Exam:  
4/5 strength   
throughout right   
lower extremity.   
Clonus throughout   
right lower extrem   
(more content not   
included)...        Normal                                  [facility]  
   
                                                    MRI LUMBAR SP WO CONTRASTon   
2021   
   
                                                    MRI LUMBAR SP WO   
CONTRAST                                STUDY:  
MRI LUMBAR SP WO   
CONTRAST; 2021   
8:46 am  
INDICATION:  
LUMBAR   
RADICULOPATHY.  
COMPARISON:  
MRI scan of the   
lumbar spine dated   
2021  
ACCESSION NUMBER(S):  
[id number]  
ORDERING CLINICIAN:  
Jonel Barnard  
TECHNIQUE:  
Sagittal T1, T2,   
STIR, axial T1 and   
T2 weighted images   
of the lumbar  
spine were acquired.  
FINDINGS:  
The alignment of the   
lumbar vertebrae is   
normal. Marrow   
signal is  
within normal   
limits. Signal   
intensity within the   
conus is normal.  
L5-S1: The findings   
are unremarkable.  
L4-5: Again, note is   
made of changes   
consistent with   
previous  
right-sided   
laminotomy. The   
shallow central disc   
herniation is again  
noted which does not   
cause significant   
central canal   
stenosis. The  
neural foramina are   
patent.  
L3-4: The findings   
are unremarkable.  
L2-3: Again, note is   
made of a shallow   
right paracentral   
disc  
herniation which   
causes mild   
impression on the   
subarachnoid space.  
The neural foramina   
are patent.  
L1-2: The findings   
are unremarkable.  
IMPRESSION:  
No significant   
interval change   
compared to prior   
examination dated  
2021          Normal                                  [facility]  
   
                                                    OPERATIVE REPORTon    
   
                                        OPERATIVE REPORT    NAME: MICHI TOLENTINO  
MR#: [id number]  
SURGEON: Jonel Barnard MD  
DATE OF SURGERY:   
2021  
OPERATIVE REPORT   
PREOPERATIVE   
DIAGNOSIS:   
Radiculitis.  
POSTOPERATIVE   
DIAGNOSIS:   
Radiculitis.  
PROCEDURE PERFORMED:   
Right L4-5   
transforaminal   
epidural injection   
with 80 mg  
of Depo-Medrol.  
DESCRIPTION OF   
PROCEDURE: The   
patient was placed   
prone on the x-ray   
table and  
MAC anesthesia   
administered. The   
back was prepped and   
draped in usual  
fashion. Local   
anesthesia was   
infiltrated with 1%   
plain Xylocaine.   
Under C-  
arm guidance, a   
20-gauge Tuohy   
epidural needle was   
placed onto the   
right L4-5  
foramen and checked   
using C-arm in AP   
and lateral planes.   
A small amount of  
dye was injected and   
we saw an excellent   
epidurogram and   
lupis-neurogram. This  
area was then   
injected with 8 cc   
of 1% plain   
Xylocaine and 80 mg   
of Depo-  
Medrol. The needle   
was withdrawn.   
Dressing applied.   
The patient was  
awakened, taken to   
recovery room in   
excellent condition.   
There were no  
complications.  
____________________  
________________  
MD THANG SANTIAGO/MODL/[id number]  
[id number]  
D: 2021   
13:54:18 T:   
2021  
E/S: Jonel Barnard MD  
21 1345  
Electronically   
Signed  
[facility] PT   
NAME:   
MICHI TOLENTINO  
MR#: [id number]  
[address] [place] [address] ACCT:   
[id number]  
: 89  
OPERATIVE REPORT    Normal                                  [facility]  
  
  
  
Vital Signs  
  
  
                          Date Time    Vital Sign   Value        Performing   
Clinician                               Facility  
   
                                                    2024   
09:          Body height         190.5 cm            Balbir JAMES  
Work Phone:   
[phone]                          [facility]   
[facility]  
   
                                                    2024   
09:                              Body mass index (BMI)   
[Ratio]                   43.87 kg/m2               Balbir JAMES  
Work Phone:   
[phone]                          [facility]   
[facility]  
   
                                                    2024   
09:          Body weight         159.21 kg           Balbir JAMES  
Work Phone:   
[phone]                          [facility]   
[facility]  
   
                                                    2024   
09:                              Diastolic blood   
pressure                  66 mm[Hg]                 Balbir JAMES  
Work Phone:   
[phone]                          [facility]   
[facility]  
   
                                                    2024   
09:          Heart rate          99 /min             Balbir JAMES  
Work Phone:   
[phone]                          [facility]  
   
                                                    2024   
09:          Respiratory rate    18 /min             Balbir JAMES  
Work Phone:   
[phone]                          [facility]  
   
                                                    2024   
09:                              SaO2% (BldA) [Mass   
fraction]                 100 %                     Balbir JAMES  
Work Phone:   
[phone]                          [facility]  
   
                                                    2024   
09:                              Systolic blood   
pressure                  132 mm[Hg]                Balbir JAMES  
Work Phone:   
[phone]                          [facility]  
   
                                                    2024   
18:                              Diastolic blood   
pressure                  68 mm[Hg]                 DO ANTON Sanchezvance  
Work Phone:   
[phone]                          [facility]  
   
                                                    2024   
18:          Heart rate          65 /min             DO ANTON Sanchezvance  
Work Phone:   
[phone]                          [facility]  
   
                                                    2024   
18:          Respiratory rate    16 /min             DO ANTON Sanchezvance  
Work Phone:   
[phone]                          [facility]  
   
                                                    2024   
18:                              SaO2% (BldA) [Mass   
fraction]                 94 %                      DO ANTON Sanchezrebecadain  
Work Phone:   
[phone]                          [facility]  
   
                                                    2024   
18:                              Systolic blood   
pressure                  133 mm[Hg]                DO ANTON Sanchezrebecadain  
Work Phone:   
[phone]                          [facility]  
   
                                                    2024   
18:                              Inhaled oxygen flow   
rate                      1 L/min                   DO ANTON Sanchezvance  
Work Phone:   
[phone]                          [facility]  
   
                                                    2024   
17:          Body temperature    98 [degF]           DO ANTON Sanchezvance  
Work Phone:   
[phone]                          [facility]  
   
                                                    2024   
13:          Body height         190.5 cm            DO ANTON Sanchezvance  
Work Phone:   
[phone]                          [facility]  
   
                                                    2024   
13:          Body weight         162 kg              DO ANTON Salcido Sarwat  
Work Phone:   
[phone]                          [facility]  
   
                                                    2024   
13:                              Diastolic blood   
pressure                  58 mm[Hg]                 DO ANTON Fam  
Work Phone:   
[phone]                          [facility]  
   
                                                    2024   
13:          Heart rate          58 /min             DO ANTON Fam  
Work Phone:   
[phone]                          [facility]  
   
                                                    2024   
13:          Respiratory rate    18 /min             DO ANTON Fam  
Work Phone:   
[phone]                          [facility]  
   
                                                    2024   
13:                              SaO2% (BldA) [Mass   
fraction]                 96 %                      DO ANTON Fam  
Work Phone:   
[phone]                          [facility]  
   
                                                    2024   
13:                              Systolic blood   
pressure                  126 mm[Hg]                DO ANTON Fam  
Work Phone:   
[phone]                          [facility]  
   
                                                    2024   
10:          Body height         190.5 cm            DO ANTON Fam  
Work Phone:   
[phone]                          [facility]  
   
                                                    2024   
10:          Body temperature    98 [degF]           DO ANTON Fam  
Work Phone:   
[phone]                          [facility]  
   
                                                    2024   
10:          Body weight         165 kg              DO ANTON Fam  
Work Phone:   
[phone]                          [facility]  
   
                                                    2024   
13:          Body height         190.5 cm            Balbir JAMES  
Work Phone:   
[phone]                          [facility]  
   
                                                    2024   
13:                              Body mass index (BMI)   
[Ratio]                   47 kg/m2                  Balbir JAMES  
Work Phone:   
[phone]                          [facility]   
[facility]  
   
                                                    2024   
13:          Body weight         170.55 kg           Balbir JAMES  
Work Phone:   
[phone]                          [facility]  
   
                                                    2024   
08:                              Body mass index (BMI)   
[Ratio]                   48.84 kg/m2               Shweta JAMES  
Work Phone:   
[phone]                          [facility]  
   
                                                    2024   
08:          Body temperature    97.9 [degF]         Shweta Crooks PA  
Work Phone:   
[phone]                          [facility]  
   
                                                    2024   
08:          Body weight         172.55 kg           Shweta Crooks PA  
Work Phone:   
[phone]                          [facility]  
   
                                                    2024   
08:                              Diastolic blood   
pressure                  92 mm[Hg]                 Shweta Crooks PA  
Work Phone:   
[phone]                          [facility]  
   
                                                    2024   
08:          Heart rate          105 /min            Shweta Crooks PA  
Work Phone:   
[phone]                          [facility]  
   
                                                    2024   
08:                              SaO2% (BldA) [Mass   
fraction]                 99 %                      Shweta Crooks PA  
Work Phone:   
[phone]                          [facility]  
   
                                                    2024   
08:                              Systolic blood   
pressure                  112 mm[Hg]                Shweta Crooks PA  
Work Phone:   
[phone]                          [facility]  
   
                                                    2023   
14:          Body height         190.5 cm            Balbir Ronquillo PA  
Work Phone:   
[phone]                          [facility]  
   
                                                    2023   
14:                              Body mass index (BMI)   
[Ratio]                   47 kg/m2                  Balbir Hopeenberg PA  
Work Phone:   
[phone]                          [facility]  
   
                                                    2023   
14:          Body weight         170.55 kg           Balbir Hopeenberg PA  
Work Phone:   
[phone]                          [facility]  
   
                                                    2023   
14:                              Diastolic blood   
pressure                  84 mm[Hg]                 Balbir Ronquillo PA  
Work Phone:   
[phone]                          [facility]  
   
                                                    2023   
14:          Heart rate          94 /min             Balbir Hopeenberg PA  
Work Phone:   
[phone]                          [facility]   
[facility]  
   
                                                    2023   
14:          Respiratory rate    16 /min             Balbir Herminiaenberg PA  
Work Phone:   
[phone]                          [facility]  
   
                                                    2023   
14:                              SaO2% (BldA) [Mass   
fraction]                 94 %                      Balbir Hopeenberg PA  
Work Phone:   
[phone]                          [facility]  
   
                                                    2023   
14:                              Systolic blood   
pressure                  130 mm[Hg]                Balbir JAMES  
Work Phone:   
[phone]                          [facility]  
   
                                                    2023   
18:                              Diastolic blood   
pressure                  86 mm[Hg]                 DO ANTON Fam  
Work Phone:   
[phone]                          [facility]  
   
                                                    2023   
18:          Heart rate          82 /min             DO ANTON Fam  
Work Phone:   
[phone]                          [facility]  
   
                                                    2023   
18:          Respiratory rate    16 /min             DO ANTON Fam  
Work Phone:   
[phone]                          [facility]  
   
                                                    2023   
18:                              SaO2% (BldA) [Mass   
fraction]                 93 %                      DO ANTON Fam  
Work Phone:   
[phone]                          [facility]  
   
                                                    2023   
18:                              Systolic blood   
pressure                  136 mm[Hg]                DO ANTON Fam  
Work Phone:   
[phone]                          [facility]  
   
                                                    2023   
18:                              Inhaled oxygen flow   
rate                      1 L/min                   DO ANTON Fam  
Work Phone:   
[phone]                          [facility]  
   
                                                    2023   
17:          Body temperature    97.5 [degF]         DO ANTON Fam  
Work Phone:   
[phone]                          [facility]  
   
                                                    2023   
16:          Body height         190.5 cm            DO ANTON Fam  
Work Phone:   
[phone]                          [facility]  
   
                                                    2023   
16:                              Body mass index (BMI)   
[Ratio]                   45.7 kg/m2                DO ANTON Fam  
Work Phone:   
[phone]                          [facility]  
   
                                                    2023   
16:          Body weight         166 kg              DO ANTON Sanchezrebecadain  
Work Phone:   
[phone]                          [facility]  
   
                                                    2023   
13:                              Blood Pressure   
Location                                            LIAN GALLAGHER  
Work Phone:   
[phone]                           [facility]   
[facility]  
   
                                                    2023   
13:                              Diastolic blood   
pressure                  86 mm[Hg]                 LIAN DEZ  
Work Phone:   
[phone]                           [facility]   
[facility]  
   
                                                    2023   
13:          Heart rate          68 /min             LIAN DEZ  
Work Phone:   
[phone]                           [facility]   
[facility]  
   
                                                    2023   
13:          Respiratory rate    16 /min             LIAN DEZ  
Work Phone:   
[phone]                           [facility]   
[facility]  
   
                                                    2023   
13:                              Systolic blood   
pressure                  124 mm[Hg]                LIAN DEZ  
Work Phone:   
[phone]                           [facility]   
[facility]  
   
                                                    2023   
15:          Body height         190.5 cm            Babar Anna   
APRN.CNP  
Work Phone:   
[phone]                          [facility]  
   
                                                    2023   
15:          Body weight         165.93 kg           Babar Anna   
APRN.CNP  
Work Phone:   
[phone]                          [facility]  
   
                                                    2023   
15:                              Diastolic blood   
pressure                  88 mm[Hg]                 Babar Anna   
APRN.CNP  
Work Phone:   
[phone]                          [facility]  
   
                                                    2023   
15:          Heart rate          109 /min            Babar Anna   
APRN.CNP  
Work Phone:   
[phone]                          [facility]  
   
                                                    2023   
15:          Respiratory rate    18 /min             Babar Anna   
APRN.CNP  
Work Phone:   
[phone]                          [facility]  
   
                                                    2023   
15:                              Systolic blood   
pressure                  124 mm[Hg]                Babar Anna   
APRN.CNP  
Work Phone:   
[phone]                          [facility]  
   
                                                    2023   
10:                              Blood Pressure   
Location                                            LIAN DEZ  
Work Phone:   
[phone]                           [facility]   
[facility]  
   
                                                    2023   
10:                              Diastolic blood   
pressure                  86 mm[Hg]                 LIAN DEZ  
Work Phone:   
[phone]                           [facility]   
[facility]  
   
                                                    2023   
10:          Heart rate          105 /min            LIAN DEZ  
Work Phone:   
[phone]                           [facility]   
[facility]  
   
                                                    2023   
10:                              Systolic blood   
pressure                  165 mm[Hg]                LIAN DEZ  
Work Phone:   
[phone]                           [facility]   
[facility]  
   
                                                    11-   
09:                              Blood Pressure   
Location                                            LIAN DEZ  
Work Phone:   
[phone]                           [facility]   
[facility]  
   
                                                    11-   
09:                              Diastolic blood   
pressure                  90 mm[Hg]                 LIAN DEZ  
Work Phone:   
[phone]                           [facility]   
[facility]  
   
                                                    11-   
09:          Heart rate          89 /min             LIAN DEZ  
Work Phone:   
[phone]                           [facility]   
[facility]  
   
                                                    11-   
09:                              Systolic blood   
pressure                  154 mm[Hg]                LIAN DEZ  
Work Phone:   
[phone]                           [facility]   
[facility]  
   
                                                    2022   
09:                              Blood Pressure   
Location                                            Aisha Lue  
Work Phone:   
[phone]                           [facility]   
[facility]  
Work Phone:   
[phone]  
   
                                                    2022   
09:                              Diastolic blood   
pressure                  74 mm[Hg]                 Aisha Lue  
Work Phone:   
[phone]                           [facility]   
[facility]  
Work Phone:   
[phone]  
   
                                                    2022   
09:          Heart rate          96 /min             Aisha Lue  
Work Phone:   
[phone]                           [facility]   
[facility]  
Work Phone:   
[phone]  
   
                                                    2022   
09:          Respiratory rate    16 /min             Aisha Lue  
Work Phone:   
[phone]                           [facility]   
[facility]   
Desiree  
Work Phone:   
[phone]  
   
                                                    2022   
09:                              Systolic blood   
pressure                  139 mm[Hg]                Aisha Marquez  
Work Phone:   
[phone]                           [facility]   
[facility]  
Work Phone:   
[phone]  
  
  
  
Encounters  
  
  
                          Encounter Date Encounter Type Care Provider Facility  
   
                                Start: 10- ambulatory      MONICA GALLAGHER                                   Facility: [facility]  
   
                                                    Start: 10-  
End: 10-     Refill              Netta Acosta RN       [facility]   
[facility]  
   
                                        Comment on above:   Lumbosacral spondylo  
sis without myelopathy   
   
                          Start: 10- ambulatory   ANTON Watsoni  
ty:[facility]  
   
                                                    Start: 2024  
End: 2024     ambulatory          EFFIE FAM     Not Available  
   
                                                    Start: 2024  
End: 2024                         Office outpatient visit   
25 minutes                              Balbir JAMES  
Work Phone:   
[phone]                          [facility]   
[facility]  
   
                                        Comment on above:   Disc displacement, t  
horacic (Primary Dx);  
Thoracic spondylosis without myelopathy   
   
                                                    Start: 2024  
End: 2024     ambulatory          ANITA S [facility]  
   
                                                    Start: 2024  
End: 2024     ambulatory          [facility]  
   
                                                    Start: 2024  
End: 2024     ambulatory          [facility]  
   
                                                    Start: 2024  
End: 09-     ambulatory          ALEXIS VEGA        Not Available  
   
                                                    Start: 2024  
End: 2024     ambulatory          MATTHEW AGOSTO V     Not Available  
   
                                                    Start: 2024  
End: 2024                         Admission to same day   
surgery center                          DO ANTON Fam  
Work Phone:   
[phone]                          [facility]   
[facility] [place]  
   
                                                    Start: 2024  
End: 2024           ambulatory                DO ANTON Fam  
Work Phone:   
[phone]                          [facility]  
Work Phone:   
[phone]  
   
                                                    Start: 2024  
End: 2024                         Patient encounter   
procedure                               DO ANTON Fam  
Work Phone:   
[phone]                          [facility]   
Testing  
Work Phone:   
[phone]  
   
                                                    Start: 2024  
End: 2024           ambulatory                DO ANTON Fam  
Work Phone:   
[phone]                          [facility]  
Work Phone:   
[phone]  
   
                                                    Start: 2024  
End: 2024     ambulatory          MATTHEW AGOSTO V     Not Available  
   
                                                    Start: 2024  
End: 2024                         Emergency department   
patient visit                           DO ANTON Fam  
Work Phone:   
[phone]                          [facility]Emergency   
Room  
Work Phone:   
[phone]  
   
                                                    Start: 2024  
End: 2024     ambulatory          ALEXIS VEGA        Not Available  
   
                                                    Start: 2024  
End: 2024     ambulatory          ALEXIS VEGA        Not Available  
   
                                                    Start: 08-  
End: 08-     ambulatory          [facility]  
   
                                Start: 2024 Registered Recurring DO ANTON Fam  
Work Phone:   
[phone]                          [facility]   
Credible  
   
                                                    Start: 2024  
End: 2024     ambulatory          EFFIE FAM     Not Available  
   
                          Start: 2024 ambulatory   EFFIE FAM [facility]  
   
                                                    Start: 2024  
End: 2024     ambulatory          JOSE A SANDERS      Not Available  
   
                                                    Start: 2024  
End: 2024     ambulatory          EFFIE FAM     Not Available  
   
                                                    Start: 2024  
End: 2024     ambulatory          EFFIE FAM     Not Available  
   
                                                    Start: 2024  
End: 2024     ambulatory          [facility]  
   
                                                    Start: 2024  
End: 2024     ambulatory          ALON [facility]  
   
                                                    Start: 2024  
End: 2024           ambulatory                PA-C LIAN ADAMS CHAMBERLAINRY                                   Facility:[facility]  
   
                                                    Start: 2024  
End: 2024                         Patient encounter   
procedure                               LIAN GALLAGHER  
Work Phone:   
[phone]                           [facility]   
[facility]  
Work Phone:   
[phone]  
   
                                                    Start: 2024  
End: 2024     ambulatory          BALBIR S [facility]  
   
                                                    Start: 04-  
End: 04-           ambulatory                PA-C LIAN ADAMS   
DEZ                                   Facility:[facility]  
   
                                                    Start: 04-  
End: 04-                         Patient encounter   
procedure                               LIAN GALLAGHER  
Work Phone:   
[phone]                           [facility]   
[facility]  
Work Phone:   
[phone]  
   
                                                    Start: 2024  
End: 2024     ambulatory          Lian Gallagher    Facility:[facility]  
   
                                                    Start: 2024  
End: 2024     ambulatory          ALON [facility]  
   
                                                    Start: 2024  
End: 2024     ambulatory          MACK LAMAS SWAIN     [facility]  
   
                                                    Start: 2024  
End: 2024           ambulatory                PA-C LIAN GALLAGHER                                   Facility:[facility]  
   
                                                    Start: 2024  
End: 2024                         Patient encounter   
procedure                               LIAN GALLAGHER  
Work Phone:   
[phone]                           [facility]   
[facility]  
Work Phone:   
[phone]  
   
                                                    Start: 2024  
End: 2024     ambulatory          BALBIRRUSLAN RONQUILLO  [facility]  
   
                                                    Start: 2024  
End: 2024                         Office outpatient visit   
25 minutes                              Balbir JAMES  
Work Phone:   
[phone]                          [facility]   
[facility]  
   
                                        Comment on above:   Lumbar radiculopathy  
 (Primary Dx)   
   
                          Start: 2024 Refill       Estephanie Alan RN [facility]   
[facility]  
   
                                        Comment on above:   Lumbosacral spondylo  
sis without myelopathy   
   
                                Start: 2024 Bamboo flowsheet Shweta Crooks   
PA  
Work Phone:   
[phone]                          [facility]   
   
                                Start: 2024 Bamboo flowsheet Shweta Crooks   
PA  
Work Phone:   
[phone]                          [facility]   
   
                                                    Start: 2024  
End: 2024     ambulatory          SHWETA CROOKS        Not Available  
   
                                                    Start: 2024  
End: 2024                         Office outpatient visit   
25 minutes                              Shweta Crooks PA  
Work Phone:   
[phone]                          [facility]   
   
                                        Comment on above:   Intertrigo (Primary   
Dx);  
Ringworm   
   
                          Start: 2024 Refill       Melony Samuel RN [facility]   
[facility]  
   
                                                    Start: 2024  
End: 2024     ambulatory          EFFIE FAM     Not Available  
   
                                                    Start: 2023  
End: 2023           ambulatory                Buzz JAMES                                      Facility:[facility]  
   
                                                    Start: 2023  
End: 2023                         Office outpatient visit   
15 minutes                              Balbir Ronquillo   
PA  
Work Phone:   
[phone]                          [facility]   
Pain [facility]  
   
                                        Comment on above:   Lumbosacral spondylo  
sis without myelopathy (Primary Dx)   
   
                                                    Start: 2023  
End: 2023     ambulatory          BALBIR RONQUILLO  [facility]  
   
                                                    Start: 10-  
End: 10-     ambulatory          BABAR CASTILLO        Facility:[facility]  
   
                                                    Start: 10-  
End: 10-                         Admission to same day   
surgery center                          Babar Castillo   
APRN.CNP  
Work Phone:   
[phone]                          [facility] [place]  
   
                                        Comment on above:   History of back surg  
venancio (Primary Dx);  
Acute right lumbar radiculopathy   
   
                                                    Start: 10-  
End: 10-                         Telemedicine consultation   
with patient                            Babar Castillo   
APRN.CNP  
Work Phone:   
[phone]                          [facility]   
MAIN  
   
                                Start: 10- ambulatory      MONICA GALLAGHER                                   Facility:[facility]  
   
                                                    Start: 2023  
End: 2023                         Admission to same day   
surgery center                          DO ANTON Salcido Sarwat  
Work Phone:   
[phone]                          [facility]   
[facility] [place]  
   
                                                    Start: 2023  
End: 2023           ambulatory                DO ANTON Salcido Sarwat  
Work Phone:   
[phone]                          [facility]  
Work Phone:   
[phone]  
   
                                                    Start: 2023  
End: 2023           ambulatory                PA-C LIAN GALLAGHER                                   Facility:[facility]  
   
                                                    Start: 2023  
End: 2023                         Patient encounter   
procedure                               LIAN GALLAGHER  
Work Phone:   
[phone]                           [facility]   
[facility]  
Work Phone:   
[phone]  
   
                                                    Start: 09-  
End: 09-           ambulatory                DO ANTON Salcido Sarwat  
Work Phone:   
[phone]                          [facility]  
Work Phone:   
[phone]  
   
                                                    Start: 09-  
End: 09-                         Patient encounter   
procedure                               DO NATON Sanchezvance  
Work Phone:   
[phone]                          [facility]   
[place]  
Work Phone:   
[phone]  
   
                                                    Start: 2023  
End: 2023           ambulatory                DO ANTON Omari Fam  
Work Phone:   
[phone]                          [facility]  
Work Phone:   
[phone]  
   
                                                    Start: 2023  
End: 2023           Departed Referred         DO ANTON Omari Fam  
Work Phone:   
[phone]                          [facility]   
Ctr-Pre-Surgical   
Testing  
Work Phone:   
[phone]  
   
                                                    Start: 2023  
End: 2023                         Patient encounter   
procedure                               DO ANTON Salcido Sarwat  
Work Phone:   
[phone]                          [facility]   
Ctr-Pre-Surgical   
Testing  
Work Phone:   
[phone]  
   
                                                    Start: 2023  
End: 05-     ambulatory          BABAR CASTILLO        Facility:[facility]  
   
                                                    Start: 2023  
End: 2023                         Patient encounter   
procedure                               Babar Castillo   
APRN.CNP  
Work Phone:   
[phone]                          [facility] [place]  
   
                                        Comment on above:   Radiculopathy of lum  
bar region (Primary Dx);  
Chronic midline low back pain with right-sided sciatica;  
Right leg numbness;  
History of back surgery   
   
                          Start: 2023 Chart abstracting Unk Pcp (Hist) Roc madera  
   
                                                    Start: 2023  
End: 2023                         Patient encounter   
procedure                               LIAN GALLAGHER  
Work Phone:   
[phone]                           [facility]   
[facility]  
Work Phone:   
[phone]  
   
                                                    Start: 2023  
End: 2023           ambulatory                DO ANTON Fam  
Work Phone:   
[phone]                          [facility]  
Work Phone:   
[phone]  
   
                                                    Start: 2023  
End: 2023                         Patient encounter   
procedure                               DO ANTON Fam  
Work Phone:   
[phone]                          [facility]   
[place]  
Work Phone:   
[phone]  
   
                                                    Start: 2022  
End: 2022                         Patient encounter   
procedure                               LIAN GALLAGHER  
Work Phone:   
[phone]                           [facility]   
[facility]  
Work Phone:   
[phone]  
   
                                                    Start: 2022  
End: 2022     ambulatory          [facility]       Facility:  
   
                                                    Start: 11-  
End: 11-                         Patient encounter   
procedure                               LIAN GALLAGHER  
Work Phone:   
[phone]                           [facility]   
[facility]  
Work Phone:   
[phone]  
   
                                                    Start: 2022  
End: 2022                         Patient encounter   
procedure                               Aisha Marquez  
Work Phone:   
[phone]                           [facility]   
[facility]  
Work Phone:   
[phone]  
   
                                                    Start: 2022  
End: 2022                         Patient encounter   
procedure                               Aisha Marquez  
Work Phone:   
[phone]                           [facility]  
Work Phone:   
[phone]  
   
                                                    Start: 2022  
End: 2022                         Patient encounter   
procedure                               Aisha STANFORDRico Jmimy  
Work Phone:   
[phone]                           [facility]  
Work Phone:   
[phone]  
   
                                                    Start: 2022  
End: 2022                         Patient encounter   
procedure                               Aisha HIENRico Branhamadams  
Work Phone:   
[phone]                           [facility]   
[facility]  
Work Phone:   
[phone]  
   
                                                    Start: 2022  
End: 2022                         Patient encounter   
procedure                               LIAN GALLAGHER  
Work Phone:   
[phone]                           [facility]   
[facility]  
Work Phone:   
[phone]  
   
                                                    Start: 2022  
End: 2022     ambulatory          DR DOCTOR LUNA      Facility:[facility]  
   
                                                    Start: 2022  
End: 2022     ambulatory          DR BEREKET LLANOS    Facility:[facility]  
   
                                        Start: 2021   Office consultation   
new/estab patient 80 min                Moisés Kelsey MD  
Work Phone:   
[phone]                          [facility]   
[address]  
Work Phone:   
[phone]  
   
                                        Start: 2021   Patient encounter   
procedure                               Moisés Kelsey MD  
Work Phone:   
[phone]                          [facility]   
[address]  
Work Phone:   
[phone]  
  
  
  
Procedures  
  
  
                          Date         Procedure    Procedure Detail Performing   
Clinician  
   
                          Start: 2024 Laparoscopic appendectomy             
   DO ANTON Fam  
Work Phone:   
[phone]  
   
                                        Start: 2024   Computed tomography   
of   
abdomen and pelvis with   
contrast                                            DO ANTON Fam  
Work Phone:   
[phone]  
   
                          Start: 2023 Repair of umbilical hernia            
    DO ANTON Fam  
Work Phone:   
[phone]  
   
                                        Start: 2023   Nerve-block blunt ne  
edle   
(physical object)                                   LIAN GALLAGHER  
Work Phone:   
[phone]  
   
                                        Start: 2022   Cystoscope, device   
(physical object)                                   Aisha Marquez  
Work Phone:   
[phone]  
   
                          Start: 2021 H/O Spinal surgery              NOHEMI GALLAGHER  
Work Phone:   
[phone]  
   
                                        Comment on above:   to make room for my   
nerves 2019   
   
                                       History of tonsillectomy              ABI GALLAGHER  
Work Phone:   
[phone]  
   
                                                            Laser uterosacral ne  
rve   
ablation                                            Aisha Marquez  
Work Phone:   
[phone]  
   
                                                            Local anesthetic ner  
ve   
block in back                                       LIAN GALLAGHER  
Work Phone:   
[phone]  
  
  
  
Plan of Treatment  
  
  
                          Date         Care Activity Detail       Author  
   
                          Start: 2025 Adult BMI Screening Adult BMI Screen  
ing [facility]  
   
                          Start: 2025 Tobacco Screening Tobacco Screening   
[facility]  
   
                          Start: 2025 Adult BMI Screening Adult BMI Screen  
ing [facility]  
   
                          Start: 2024 Adult BMI Screening Adult BMI Screen  
ing [facility]  
   
                          Start: 2024 Tobacco Screening Tobacco Screening   
[facility]  
   
                                        Start: 2024   COVID-19 Vaccine ( season)                         COVID-19 Vaccine ( season)                         [facility]  
   
                          Start: 2024 Influenza vaccination Influenza Vacc  
ine [facility]  
   
                          Start: 2024                           [facility]  
   
                          Start: 2024                           [facility]  
   
                                                    Start: 2024  
End: 2024                         Patient encounter   
procedure                               2024 1:15 PM EST   
Office Visit [facility] [address]   
[place]   
[id number] [phone]   
Balbir Ronquillo PA   
[address]   
[place] [address]   
[phone] (Work)   
[phone] (Fax)                      [facility]   
[facility]   
Clinic  
   
                                        Start: 2024   DTaP,Tdap and Td   
Vaccines (6 - Td or   
Tdap)                                   DTaP,Tdap and Td   
Vaccines (6 - Td or   
Tdap)                                   [facility]  
   
                                        Start: 2024   Urine microalbumin   
profile                                 DTaP,Tdap,Td Vaccine (6   
- Td or Tdap)                           [facility]  
   
                                                    Start: 2023  
End: 2023                                             [facility]  
   
                          Start: 09-                           [facility]  
   
                                        Start: 2023   Covid-19 Vaccine ( season)                         Covid-19 Vaccine ( season)                         [facility]  
   
                          Start: 2023 Influenza vaccination              [facility]  
   
                          Start: 2023 DEPRESSION ASSESSMENT DEPRESSION ASS  
ESSMENT [facility]  
   
                                        Start: 2021   COVID-19 VACCINE (2   
-   
Booster for Brianna   
series)                                 COVID-19 VACCINE (2 -   
Booster for Brianna   
series)                                 [facility]  
   
                                        Start: 2014   HPV Vaccine (2 - Mal  
e   
3-dose series)                          HPV Vaccine (2 - Male   
3-dose series)                          [facility]  
   
                                        Start: 2013   Urine microalbumin   
profile                   DTAP,TDAP,TD (1 - Tdap)   [facility]  
   
                                        Start: 2012   Adult BMI Follow Up   
Plan                      Adult BMI Follow Up Plan  [facility]   
[facility]  
   
                          Start: 2012 HEPATITIS C SCREENING HEPATITIS C SC  
REENING [facility]  
   
                          Start: 2012 HIV SCREENING HIV SCREENING [facility]  
   
                          Start: 2006 [facility]  
   
                                        Start: 1994   HEPATITIS B (1 of 3   
-   
3-dose series)                          HEPATITIS B (1 of 3 -   
3-dose series)                          [facility]  
   
                          Start: 1994 Tobacco Counseling Tobacco Counselin  
g [facility]   
[facility]  
   
                                                      
End: 2025                         MR Thoracic spine WO   
contrast                                MR thoracic spine   
without contrast Imaging   
Routine Disc   
displacement, thoracic   
Thoracic spondylosis   
without myelopathy 1   
Occurrences starting   
2024 until   
2025                              [employer]  
Work Phone:   
[phone]  
   
                                        Comment on above:   1 Occurrences starti  
ng 2024 until 2025   
   
                                                      
End: 2024                         Mri spinal canal lumbar   
w/o & w/contr matrl                     MRI LUMBAR SPINE WO/W   
IVCON Radiology Routine   
Radiculopathy of lumbar   
region Chronic midline   
low back pain with   
right-sided sciatica   
Right leg numbness 1   
Occurrences starting   
2023 until   
2024                              [facility]  
Work Phone:   
[phone]  
   
                                        Comment on above:   1 Occurrences starti  
ng 2023 until 2024   
   
                                                            Njx anes&/strd w/img  
   
tfrml edrl lmbr/sac 1   
lvl                                     INJECTION SPINE   
TRANSFORAMINAL Lumbar   
radiculopathy                           [facility]   
System  
   
                                       Patient Education              [facility]  
Work Phone:   
[phone]  
   
                                       Patient referral              [facility]  
Work Phone:   
[phone]  
   
                                                            Testosterone Free   
[Mass/volume] in Serum   
or Plasma                                           [facility]  
  
  
  
Immunizations  
  
  
                      Immunization Date Immunization Notes      Care Provider Fa  
cilirivas  
   
                                        2021          Brianna COVID-19 Vac  
cine   
0.5 ML Intramuscular   
Suspension                                          Moisés Kelsey MD  
Work Phone:   
[phone]                          [facility]  
   
                                        2021          SARS-CoV-2 (COVID-19  
)   
Ad26 vaccine,   
recombinant                                         Aisha Lue  
Work Phone:   
[phone]                           [facility]   
[facility]  
Work Phone:   
[phone]  
   
                                        2021          SARS-CoV-2 (COVID-19  
)   
Ad26 vaccine,   
recombinant                                         Aisha Lue  
Work Phone:   
[phone]                           [facility]  
Work Phone:   
[phone]  
   
                                        2014          HPV, unspecified   
formulation                                         LIAN GALLAGHER  
Work Phone:   
[phone]                           [facility]  
   
                                        2014          human papilloma viru  
s   
vaccine, quadrivalent                               Moisés Kelsey MD  
Work Phone:   
[phone]                          [facility]  
[address]  
Work Phone:   
[phone]  
   
                                        2014          tetanus toxoid, redu  
evangelina   
diphtheria toxoid, and   
acellular pertussis   
vaccine, adsorbed                                   Moisés Kelsey MD  
Work Phone:   
[phone]                          [facility]  
   
                                        1996          diphtheria, tetanus   
toxoids and pertussis   
vaccine                                             Moisés Kelsey MD  
Work Phone:   
[phone]                          [facility]  
[address]  
Work Phone:   
[phone]  
   
                                        1996          haemophilus influenz  
ae   
type b vaccine,   
conjugate unspecified   
formulation                                         Moisés Kelsey MD  
Work Phone:   
[phone]                          [facility]  
[address]  
Work Phone:   
[phone]  
   
                                        1996          Hib, unspecified   
formulation                                         LIAN CHAMBERLAINRY  
Work Phone:   
[phone]                           [facility]  
   
                                        10-          measles, mumps and   
rubella virus vaccine                               Moisés Kelsey MD  
Work Phone:   
[phone]                          [facility]  
   
                                        1995          diphtheria, tetanus   
toxoids and pertussis   
vaccine                                             Moisés Kelsey MD  
Work Phone:   
[phone]                          [facility]  
[address]  
Work Phone:   
[phone]  
   
                                        1995          haemophilus influenz  
ae   
type b vaccine,   
conjugate unspecified   
formulation                                         Moisés Kelsey MD  
Work Phone:   
[phone]                          [facility]  
[address]  
Work Phone:   
[phone]  
   
                                        1995          hepatitis B vaccine,  
   
pediatric or   
pediatric/adolescent   
dosage                                              Moisés Kelsey MD  
Work Phone:   
[phone]                          [facility]  
   
                                        1995          Hib, unspecified   
formulation                                         LIAN GALLAGHER  
Work Phone:   
[phone]                           [facility]  
   
                                        1995          trivalent poliovirus  
   
vaccine, live, oral                                 Moisés Kelsey MD  
Work Phone:   
[phone]                          [facility]  
[address]  
Work Phone:   
[phone]  
   
                                        1995          diphtheria, tetanus   
toxoids and pertussis   
vaccine                                             Moisés Kelsey MD  
Work Phone:   
[phone]                          [facility]  
[address]  
Work Phone:   
[phone]  
   
                                        1995          haemophilus influenz  
ae   
type b vaccine,   
conjugate unspecified   
formulation                                         Moisés Kelsey MD  
Work Phone:   
[phone]                          [facility]  
[address]  
Work Phone:   
[phone]  
   
                                        1995          hepatitis B vaccine,  
   
pediatric or   
pediatric/adolescent   
dosage                                              Moisés Kelsey MD  
Work Phone:   
[phone]                          [facility]  
   
                                        1995          Hib, unspecified   
formulation                                         LIAN GALLAGHER  
Work Phone:   
[phone]                           [facility]  
   
                                        1995          trivalent poliovirus  
   
vaccine, live, oral                                 Moisés Kelsey MD  
Work Phone:   
[phone]                          [facility]  
[address]  
Work Phone:   
[phone]  
   
                                        1995          diphtheria, tetanus   
toxoids and pertussis   
vaccine                                             Moisés Kelsey MD  
Work Phone:   
[phone]                          [facility]  
[address]  
Work Phone:   
[phone]  
   
                                        1995          haemophilus influenz  
ae   
type b vaccine,   
conjugate unspecified   
formulation                                         Moisés Kelsey MD  
Work Phone:   
[phone]                          [facility]  
[address]  
Work Phone:   
[phone]  
   
                                        1995          Hib, unspecified   
formulation                                         LIAN GALLAGHER  
Work Phone:   
[phone]                           [facility]  
   
                                        1995          trivalent poliovirus  
   
vaccine, live, oral                                 Moisés Kelsey MD  
Work Phone:   
[phone]                          [facility]  
[address]  
Work Phone:   
[phone]  
   
                                        1994          hepatitis B vaccine,  
   
pediatric or   
pediatric/adolescent   
dosage                                              Moisés Kelsey MD  
Work Phone:   
[phone]                          [facility]  
  
  
  
Payers  
  
  
                          Date         Payer Category Payer        Policy ID  
   
                          2023   Self-pay                  [id number]  
[id number]  
   
                          2020   Medicaid                  [id number]  
[id number]  
   
                          2020   Medicaid                  [id number]   
[id number]  
   
                          2014   Unknown                   [id number] [facility]  
[id number]  
   
                          1994   Unknown                   [id number] 2.16.84  
[id number]  
   
                          1994   Unknown                   [id number] 2.16.84  
0.1.276944.3.579.2.593  
   
                          1994   Unknown                   [id number] 2.16.84  
0.1.036700.3.579.2.593  
   
                          1994   Unknown                   [id number] 2.16.8  
40.1.652938.3.579.2.727  
   
                          1994   Unknown                   [id number] 2.16.8  
40.1.573924.3.579.2.727  
   
                          1994   Unknown                   [id number] 2.16.8  
40.1.093785.3.579.2.727  
   
                          1994   Unknown                   [id number] 2.16.8  
40.1.675540.3.579.2.727  
   
                          1994   Unknown                   [id number] 2.16.8  
40.1.975208.3.579.2.727  
   
                          1994   Unknown                   [id number] 2.16.8  
40.1.435527.3.579.2.727  
   
                          1994   Unknown                   [id number] 2.16.8  
40.1.415918.3.579.2.1286  
   
                          1994   Unknown                   [id number] 2.16.8  
40.1.744717.3.579.2.1286  
   
                          1994   Unknown                   [id number] 2.16.8  
40.1.889417.3.579.2.1286  
   
                          1994   Unknown                   [id number] 2.16.8  
40.1.808490.3.579.2.1281994   Unknown                   [id number] 2.16.8  
40.1.265634.3.579.2.1281994   Unknown                   [id number] 2.16.8  
40.1.354309.3.579.2.1286  
   
                          1994   Unknown                   [id number] 2.16.8  
40.1.127930.3.579.2.1281994   Unknown                   [id number] 2.16.8  
40.1.717648.3.51994   Unknown                   [id number] 2.16.8  
40.1.501839.3.51994   Unknown                   [id number] 2.16.8  
40.1.540625.3.51994   Unknown                   [id number] 2.16.8  
40.1.233857.3.51994   Unknown                   [id number] 2.16.8  
40.1.542683.3.51994   Unknown                   [id number] 2.16.8  
40.1.360395.3.51994   Unknown                   [id number] 2.16.8  
40.1.444826.3.51994   Unknown                   [id number] 2.16.8  
40.1.259656.3.51994   Unknown                   [id number] 2.16.8  
40.1.936439.3.51994   Unknown                   [id number] 2.16.84  
0.1.534633.3.51994   Unknown                   [id number] 2.16.84  
0.1.134502.3.51994   Unknown                   [id number] 2.16.84  
0.1.747766.3.51994   Unknown                   [id number] 2.16.84  
0.1.912781.3.51994   Unknown                   [id number] 2.16.84  
0.1.073709.3.51994   Unknown                   [id number] 2.16.84  
0.1.525070.3.51994   Unknown                   [id number] 2.16.84  
0.1.860767.3.579.2.9  
   
                          1994   Unknown                   [id number] 2.16.84  
0.1.911695.3.579.2.9  
   
                          1994   Unknown                   [id number] 2.16.84  
0.1.944052.3.51994   Unknown                   [id number] 2.16.84  
0.1.333882.3.51994   Unknown                   [id number] 2.16.84  
0.1.559803.3.51994   Unknown                   [id number] 2.16.84  
0.1.102886.3.51994   Unknown                   [id number] 2.16.84  
0.1.040306.3.51994   Unknown                   [id number] 2.16.84  
0.1.612871.3.51994   Unknown                   [id number] 2.16.84  
0.1.716293.3.51994   Unknown                   [id number] 2.16.8  
40.1.509848.3.51994   Unknown                   [id number] 2.16.8  
40.1.048409.3.51994   Unknown                   [id number] 2.16.8  
40.1.542144.3.579.2.718  
   
                          1960   Unknown                   [id number]  
   
                                       Unknown      [employer]     
   
                                       Unknown      [place] [employer] [id number]   
[id number]  
   
                                       Unknown      [place] [employer] [id number]   
[id number]  
   
                                       Unknown                   [id number] 2.16.8  
40.1.441884.3.579.2.531  
   
                                       Unknown                   [id number] 2.16.8  
40.1.031521.3.579.2.531  
   
                                       Unknown                   [id number] 2.16.8  
40.1.410354.3.579.2.531  
   
                                       Unknown                   [id number] 2.16.8  
40.1.454831.3.579.2.531  
  
  
  
Social History  
  
  
                          Date         Type         Detail       Facility  
   
                                                    Start: 2021  
End: 2023           Tobacco smoking status    Smokeless tobacco user   
within last 30 days                     [facility]   
[facility]  
Work Phone:   
[phone]  
   
                                                    Start: 05-  
End: 2024     Sex Assigned At Birth Male                [facility]   
[facility]  
Work Phone:   
[phone]  
   
                          Start: 1995 Sex Assigned At Birth Male         [facility]  
   
                                                    Start: 11-  
End: 2023           Tobacco smoking status    Never smoked tobacco   
(finding)                               [facility]  
   
                                                            Tobacco smoking stat  
[facility]                                    Tobacco smoking   
consumption unknown                     [facility]  
   
                          Start: 1995 Sex Assigned At Birth Not on file  C  
[facility]  
   
                                        Start: 2023   Tobacco smoking stat  
[facility]                      Ex-smoker                 [facility]  
Work Phone:   
[phone]  
   
                                                    Start: 2024  
End: 2024     History of tobacco use Current smoker      [facility]  
Work Phone:   
[phone]  
   
                                       History of tobacco use Cigarette Smoker C  
[facility]  
Work Phone:   
[phone]  
   
                                                    Start: 11-  
End: 2023                         Tobacco use and   
exposure                                User of smokeless   
tobacco                                 [facility]  
Work Phone:   
[phone]  
   
                                       History of tobacco use Chews Tobacco [facility]  
Work Phone:   
[phone]  
   
                                                    Start: 05-  
End: 2024                         History of Social   
function                                            [facility]  
   
                                                    Start: 2023  
End: 2024           Alcohol intake            Current drinker of   
alcohol (finding)                       [facility]   
System  
   
                                                            Within the last year  
,   
have you been afraid of   
your partner or   
ex-partner?               No                        [facility]  
   
                                                            Are you now ,  
   
, ,   
, never   
 or living with   
a partner?                Living with partner       [facility]  
   
                                                            How often to you hav  
e a   
drink containing   
alcohol?                  Monthly or less           [facility]  
   
                                                            How many standard   
drinks containing   
alcohol do you have on   
a typical day?            1 or 2                    [facility]  
   
                                                            How often do you hav  
e 6   
or more drinks on 1   
occasion?                 Never                     [facility]  
   
                                                            How hard is it for y  
ou   
to pay for the very   
basics like food,   
housing, medical care,   
and heating               Not very hard             [facility]  
   
                                                            Do you feel stress -  
   
tense, restless,   
nervous, or anxious, or   
unable to sleep at   
night because your mind   
is troubled all the   
time - these days [OSQ]   Rather much               [facility]  
   
                                                            (I/We) worried wheth  
er   
(my/our) food would run   
out before (I/we) got   
money to buy more.        Never true                [facility]  
   
                                Start: 07- Alcohol Comment 6+ drinks less  
 than   
monthly                                 [facility]  
   
                                Start: 03- Gender identity Identifies as   
male   
gender (finding)                        [facility]  
  
  
  
Medical Equipment  
  
  
                                Procedure Code  Equipment Code  Equipment Origin  
al   
Text                                    Equipment   
Identifier                              Dates  
   
                                                    Repair, hernia,   
umbilical                                           Abdominal hernia   
surgical mesh,   
synthetic polymer,   
non-bioabsorbable                       [id number]  
86 FDA                                  Start:   
2023  
  
  
  
Goals  
  
  
                                Date            Patient Goal    Desired Activity  
/State  
   
                                                                  
  
  
  
Functional Status  
  
  
                          Date         Assessment   Result       Facility  
   
                          2023   Functional Status N/A          [facility]  
   
                          2023   Functional Status N/A          [facility]  
   
                          11-   Functional Status N/A          [facility]  
   
                          2022                N/A          [facility]  
Work Phone: [phone]  
   
                          2022   Functional Status N/A          [facility]  
  
  
  
Clinical Notes 2022 to 10-  
    Telephone Encounter - Netta Acosta RN - 10/07/2024 10:17 AM EDTTelephone   
Encounter - JACOB Burris - 10/07/2024 10:17 AM EDTTelephone Encounter 
- Netta Acosta RN - 10/07/2024 10:17 AM EDT  
  
                                Note Date & Type Note            Facility  
   
                                                    10- Miscellaneous   
Notes                                   Formatting of this note might be   
different from the original.  
Last Office Visit: 2024  
Next Office Visit: Visit date not   
found  
Last Urine Drug Screen: No   
results found for:  BENZOSCRN   
  
OARRS appropriate  
  
  
Electronically signed by eNtta Acosta RN at 10/07/2024 10:20 AM   
EDT  
Formatting of this note might be   
different from the original.  
Is patient still on propanalol?   
There is contraindication between   
zanaflex and propanalol when I   
try to send Rx.  
Electronically signed by JACOB Burris at 10/08/2024 3:36   
PM EDT  
Formatting of this note might be   
different from the original.  
At his last refill request dated   
2024, this was flagged at   
that time, I called him per your   
request, he stated he'd been on   
it for at least 6-7 months with   
no side effects  
  
Today I called pt to inquire   
about any side effects   
(hypotension, dizziness, passing   
out) since the July conversation;   
pt denies any symptoms and is   
aware to call this office if he   
develops side effects.  
Electronically signed by Netta Acosta RN at 10/08/2024 4:56 PM   
EDT  
documented in this encounter            [facility]  
   
                                                    10- Telephone   
encounter Note                          Formatting of this note might be   
different from the original.  
Last Office Visit: 2024  
Next Office Visit: Visit date not   
found  
Last Urine Drug Screen: No   
results found for:  BENZOSCRN   
  
OARRS appropriate  
  
  
Electronically signed by Netta Acosta RN at 10/07/2024 10:20 AM   
EDT  
                                        [facility]  
   
                                                    10- Telephone   
encounter Note                          Formatting of this note might be   
different from the original.  
Is patient still on propanalol?   
There is contraindication between   
zanaflex and propanalol when I   
try to send Rx.  
Electronically signed by JACOB uBrris at 10/08/2024 3:36   
PM EDT  
                                        [facility]  
   
                                                    10- Telephone   
encounter Note                          Formatting of this note might be   
different from the original.  
At his last refill request dated   
2024, this was flagged at   
that time, I called him per your   
request, he stated he'd been on   
it for at least 6-7 months with   
no side effects  
  
Today I called pt to inquire   
about any side effects   
(hypotension, dizziness, passing   
out) since the July conversation;   
pt denies any symptoms and is   
aware to call this office if he   
develops side effects.  
Electronically signed by Netta Acosta RN at 10/08/2024 4:56 PM   
EDT  
                                        [facility]  
   
                                                    2024 History of   
Present illness Narrative               Formatting of this note is   
different from the original.  
[facility]  
Pain Management  
[address] [place] [address]  
Phone: [phone]  
  
Patient: Michi Tolentino  
Sex: male : 1995 Age: 29   
y.o.  
PCP: EFFIE FAM JR, DO  
  
2024  
Michi Tolentino is here for   
a(n) follow up post 2024   
right L 5,S1 nerve root injection   
with 50% relief of leg pain.   
Patient continues with PT for   
thoracic pain. He has completed 9   
visits as of today. Patient will   
be starting aqua therapy after   
incision from recent appendectomy   
heals.  
  
  
Chief Complaint  
Patient presents with  
Back Pain  
  
HPI:  
PT/HEP 2023, 2024(is   
currently in therapy has had 9   
visits and is going to pool after   
appy incision heals - making   
patient feel worse)  
  
Back:  
21Bilateral L 4/5 5/1 MBB   
with 80-90% relief for 3-4 hours.  
21 Harish L 4/5 5/1 MBB w/mod   
relief  
22 Bilateral L4/5, 5/1 MBB   
w/ 100% relief x2 days  
22 Right and 22 Left   
L4/5, 5/1 RFA with 80% relief   
reported  
10/21/22 Right NRI L4, 5 with no   
relief reported.  
2023 caudal Epidural Steroid   
Injection with 50% relief x 2   
days.  
3/17/23 Right L2, 3 NRI with no   
relief  
23 Left and 23 Right   
L4/5, 5/1 RFA with 50% relief  
3/29/24 NRI Right L5S1 with 60%   
relief and 100% RLE reported. He   
is able to walk without a cane   
since procedure  
2024 right L 5,S1 nerve root   
injection with 50% relief of leg   
pain.  
  
Back Pain  
This is a chronic problem. The   
current episode started more than   
1 year ago (2020). The   
problem occurs constantly. The   
problem has been gradually   
worsening since onset. The pain   
is present in the lumbar spine   
and thoracic spine. Quality:   
pressure, pulsing, dull. Radiates   
to: right groin and right leg,   
foot, big toe. The pain is at a   
severity of 8/10 (lumbar 8/10,   
thoracic 7-8/10). The pain is   
severe. The pain is Worse during   
the day (worse when he is   
working). The symptoms are   
aggravated by bending, sitting,   
twisting, standing and coughing   
(walking, stairs, lifting,   
leaning, push/pull,   
sneezing,transitioning, cold).   
Stiffness is present In the   
morning. Associated symptoms   
include headaches (x1 weekly),   
leg pain (right leg, foot/toes),   
numbness (right leg to foot/toes)   
and tingling (right leg to   
foot/toes). Pertinent negatives   
include no bladder incontinence,   
bowel incontinence, chest pain,   
fever or weakness. Risk factors   
include lack of exercise, obesity   
and poor posture. He has tried   
NSAIDs, muscle relaxant, ice and   
home exercises (tramadol mild   
relief, PT/HEP(), hx back sx,   
injections, activity   
modifications/avoidance;   
cymbalta, gabapentin, diclofenac,   
voltaren, aleve, IBU: mild relief   
w muscle relaxer, ) for the   
symptoms. The treatment provided   
moderate relief.  
  
The effect of pain on patient's   
ADLS: Mild Impairment.  
  
Past Medical History:  
Diagnosis Date  
Chronic lower back pain  
Chronic pain disorder  
COVID 2022  
Depression  
Gait abnormality  
utilizes cane  
GERD (gastroesophageal reflux   
disease)  
Joint pain  
Low testosterone  
Migraine  
Numbness  
Obesity  
Sleep apnea  
  
Past Surgical History:  
Procedure Laterality Date  
APPENDECTOMY 2024  
BACK SURGERY 2021  
Dr. Yinka Monsalve's   
in [place]  
COLONOSCOPY  
ESOPHAGOGASTRODUODENOSCOPY  
INJECTION BLOCK EPIDURAL CAUDAL   
STEROID N/A 2023  
Performed by Mack Swain MD   
at [facility]  
INJECTION BLOCK NERVE MEDIAL   
BRANCH bilat L 4/5,  Bilateral   
2021  
Performed by Mack Swain MD   
at [facility]  
INJECTION BLOCK NERVE MEDIAL   
BRANCH: bilat L 4/5 / Bilateral   
2022  
Performed by Mack Swain MD   
at [facility]  
INJECTION BLOCK NERVE MEDIAL   
BRANCH: bilat L 2/3, 3/4   
Bilateral 2024  
Performed by Mack Swain MD   
at [facility]  
INJECTION BLOCK SACROILIAC JOINT   
Bilateral 2021  
Performed by Mack Swain MD   
at [facility]  
INJECTION SPINE TRANSFORAMINAL   
Right L 2,3 Nroot Right 3/17/2023  
Performed by Mack Swain MD   
at [facility]  
INJECTION SPINE TRANSFORAMINAL   
Right L 4,5 Nroot Right   
10/21/2022  
Performed by Mack Swain MD   
at [facility] SPINE TRANSFORAMINAL:   
right L 5,1 nroot Right 2024  
Performed by Mack Swain MD   
at [facility]  
INJECTION SPINE TRANSFORAMINAL:   
right L 5,1 nroot Right 3/29/2024  
Performed by Mack Swain MD   
at [facility]  
INJECTION SPINE TRANSFORAMINAL:   
right L 5,1 nroot Right   
12/15/2023  
Performed by Mack Swain MD   
at [facility]  
RADIOFREQUENCY ABLATION SPINAL:   
left L 4/5 5/1 Left 2023  
Performed by Mack Swain MD   
at [facility]  
RADIOFREQUENCY ABLATION SPINAL:   
left L 4/5 5/1 Left 2022  
Performed by Mack Swain MD   
at [facility]  
RADIOFREQUENCY ABLATION SPINAL:   
right L 4/5 5/ Right 2023  
Performed by Mack Swain MD   
at [facility]  
RADIOFREQUENCY ABLATION SPINAL:   
right L 4/5 5/ Right 2022  
Performed by Mack Swain MD   
at [facility]  
TONSILLECTOMY ADENOIDECTOMY  
UMBILICAL HERNIA REPAIR 2023  
  
Allergies  
Allergen Reactions  
Other Other (See Comments)  
Shellfish makes itch  
Iv dye ok pt had  
  
Shellfish Derived  
Itch-  
IV dye ok pt had  
  
Family History  
Problem Relation Age of Onset  
Hypertension Mother  
Cancer Mother  
Cancer Father  
Hypertension Father  
Cancer Maternal Grandmother  
Hypertension Maternal Grandmother  
Cancer Maternal Grandfather  
Hypertension Maternal Grandfather  
Cancer Paternal Grandmother  
Hypertension Paternal Grandmother  
Cancer Paternal Grandfather  
Hypertension Paternal Grandfather  
  
Social History  
  
Socioeconomic History  
Marital status: Single  
Spouse name: Not on file  
Number of children: Not on file  
Years of education: Not on file  
Highest education level: Not on   
file  
Occupational History  
Not on file  
Tobacco Use  
Smoking status: Never  
Smokeless tobacco: Current  
Types: Chew  
Vaping Use  
Vaping status: Never Used  
Substance and Sexual Activity  
Alcohol use: Yes  
Alcohol/week: 1.0 standard drink   
of alcohol  
Types: 1 Cans of beer per week  
Drug use: Yes  
Types: Marijuana, Other  
Comment: 7 grams per wk of   
marijuana  
Sexual activity: Defer  
Other Topics Concern  
Not on file  
Social History Narrative  
Not on file  
  
Social Determinants of Health  
  
Financial Resource Strain: Low   
Risk (2023)  
Received from [facility]  
Overall Financial Resource Strain   
(CARDIA)  
Difficulty of Paying Living   
Expenses: Not very hard  
Food Insecurity: No Food   
Insecurity (2024)  
Hunger Screening  
Food Insecurity - Worry: Never   
True  
Food Insecurity - Inability:   
Never True  
Transportation Needs: No   
Transportation Needs (2023)  
Received from [facility]  
PRAPARE - Transportation  
Lack of Transportation (Medical):   
No  
Lack of Transportation   
(Non-Medical): No  
Physical Activity: Inactive   
(2023)  
Received from [facility]  
Exercise Vital Sign  
Days of Exercise per Week: 0 days  
Minutes of Exercise per Session:   
0 min  
Stress: Stress Concern Present   
(2023)  
Received from [facility]  
[place] [employer]ational   
Health - Occupational Stress   
Questionnaire  
Feeling of Stress : Rather much  
Social Connections: Socially   
Isolated (2023)  
Received from [facility]  
Social Connection and Isolation   
Panel [NHANES]  
Frequency of Communication with   
Friends and Family: Once a week  
Frequency of Social Gatherings   
with Friends and Family: Once a   
week  
Attends [employer] Services: Never  
Active Member of Clubs or   
Organizations: No  
Attends Club or Organization   
Meetings: Never  
Marital Status: Living with   
partner  
Interpersonal Safety: Unknown   
(2024)  
[facility] from [facility]  
[place] Safety [place]  
Fear of Current or Ex-Partner:   
Not on file  
Emotionally Abused: Not on file  
Physically Abused: Not on file  
Sexually Abused: Not on file  
Physically or Sexually Abused:   
Not on file  
Housing Instability: Low Risk   
(2023)  
Received from [facility]  
Housing Stability Vital Sign  
Unable to Pay for Housing in the   
Last Year: No  
Number of Places Lived in the   
Last Year: 1  
Unstable Housing in the Last   
Year: No  
  
Review of Systems  
Constitutional: Negative.   
Negative for chills, fatigue and   
fever.  
HENT: Negative.  
Eyes: Negative.  
Respiratory: Negative. Negative   
for cough and shortness of   
breath.  
Cardiovascular: Negative.   
Negative for chest pain.  
Gastrointestinal: Negative.   
Negative for bowel incontinence.  
Endocrine: Negative.  
Genitourinary: Negative. Negative   
for bladder incontinence.  
Musculoskeletal: Positive for   
back pain. Negative for gait   
problem.  
Skin: Negative for rash and   
wound.  
Allergic/Immunologic: Negative.  
Neurological: Positive for   
tingling (right leg to   
foot/toes), numbness (right leg   
to foot/toes) and headaches (x1   
weekly). Negative for weakness.  
Hematological: Negative.  
Psychiatric/Behavioral: Negative.   
Negative for self-injury and   
suicidal ideas.  
Hx of bipolar disorder  
  
  
Vital Signs:  
/66   Pulse 99   Resp 18     
Ht 190.5 cm (6' 3 )   Wt (!)   
159.2 kg (351 lb)   SpO2 100%     
BMI 43.87 kg/m  
  
Physical Exam:  
GENERAL - Healthy patient that   
appears stated age.  
HEENT - Normocephalic /   
Atraumatic, Extraoccular   
movements intact, trachea   
midline, thyroid within normal   
limits.  
CV - pulse regular, Warm   
extremities with appropriate   
color of nailbeds.  
RESP - No obvious wheezing, No   
Shortness of Breath, No   
overexertion response to exam   
maneuvers.  
COORDINATION - remains intact.  
PSYCH - Alert and Oriented x4,   
Attentive and appropriate,   
constitutionally normal, displays   
normal mood and affect per   
situation, answered questions   
appropriately during examination,   
demonstrated appropriate   
attention during discussion,   
demonstrated appropriate   
cognitive reasoning and   
understanding of the medical   
condition by asking appropriate   
questions regarding the diagnosis   
and risks/benefits/alternatives   
of treatment modalities. No   
obvious deficits in memory,   
reasoning, or intellect.  
Thoracic:  
SKIN - No rashes or bruising in   
the area of the patient s pain.  
LYMPH NODES - demonstrate no   
obvious enlargement.  
EXTREMITIES - Extremities are   
warm, with minimal edema and   
palpable pulses.  
Tenderness to palpation noted in   
the thoracic spine and paraspinal   
musculature. Pain is elicited   
with flexion, extension, and   
lateral rotation of the thoracic   
spine. Range of motion is   
diminished with these motions due   
to pain. Facet palpation is noted   
to be somewhat tender but not   
concordant with the patient s   
normal pain complaints.  
STRENGTH - noted to be 5 out of 5   
all muscle groups bilateral upper   
and lower extremities. No notable   
atrophy, fasciculations or spasm.  
SENSORY - No notable sensory   
deficits in the thoracic   
dermatomal distributions.  
  
Assessment/Treatment Plan:  
Michi was seen today for back   
pain.  
  
Diagnoses and all orders for this   
visit:  
  
Disc displacement, thoracic  
- MR thoracic spine without   
contrast; Future  
  
Thoracic spondylosis without   
myelopathy  
- MR thoracic spine without   
contrast; Future  
  
Pregabalin 150 mg three times   
daily  
  
MRI - Thoracic MRI without   
contrast  
It is felt that additional   
diagnostic testing is necessary   
to further evaluate the patients   
current pain pathology. For this   
reason, we will order additional   
imaging noted above. It is   
hopeful that this study will   
identify a significant pain   
generator that will be amenable   
to therapy. It is felt that this   
modality is necessary due to the   
severity and chronicity of   
symptoms and physical exam   
findings combined with the lack   
of recent imaging of the area. An   
MRI is specifically felt to be   
necessary due to the physical   
exam findings noted above and the   
patient s description of   
refractory pain in a neuropathic   
distribution that is not relieved   
by change in body position and   
interferes with the patient s   
activities of daily living  
  
Follow up after MRI  
  
The medications I have prescribed   
have been reviewed for medication   
interactions/contraindications   
and/or for upcoming procedures:   
continue current medication   
regimen without any changes.  
  
DISCUSSION: Treatment options   
discussed with patient and all   
questions answered to patient's   
satisfaction. Discussed the rules   
and regulations surrounding   
prescription of opioids and   
compliance at length. Failure to   
follow the rules and regulation   
will result in tapering and   
discontinuation of medications if   
applicable. The patient has been   
instructed as to the type of   
medication prescribed along with   
directions for use. Potential   
side effects have been discussed,   
along with risks and benefits of   
taking this medication. (S)he was   
instructed as to what to do if   
(s)he experiences side effects,   
including when to discontinue the   
medication. (S)he was advised to   
call this office in this event.   
Also discussed at length safety   
and security of RX and   
medications. Due to the high risk   
nature of this patient's pain   
medication regimen, frequent   
office visit refill appointments   
(every 1-3 months) are medically   
necessary to monitor for an   
addiction disorder.  
  
Prescribed medication that   
requires intensive monitoring for   
toxicity Lyrica.  
  
Treatment plans discussed but not   
opted for at this time: Thoracic   
Epidural Steroid Injection.   
Patient would like to proceed   
with the current outlined   
treatment plan before moving   
forward with any other options  
  
The spine model was demonstrated   
and MRI was reviewed and used to   
explain the condition.  
  
Chronic conditions not treated   
during this visit that affected   
my overall medical decision   
making: Obesity, Anxiety,   
Depression  
  
OARRS: OARRS reviewed, discussed   
and appropriate for medications   
prescribed.  
  
Scribe Statement:  
  
Scribed for and in the presence   
of JACOB BURRIS by   
Rebeca Malone RN.  
  
Provider Statement:  
  
I, JACOB BURRIS,   
personally performed the services   
described in the documentation,   
as scribed by Rebeca Malone RN in my presence, and it is both   
accurate and complete.  
  
  
Rebeca Malone RN  
24 [id number]  
  
  
Rebeca Malone RN  
24 [id number]  
  
  
JACOB Burris  
10/03/24 1536  
  
Electronically signed by JACOB Burris at 10/03/2024 3:36   
PM EDT  
documented in this encounter            [facility]  
   
                                        2024 Instructions   
  
  
Rebeca Malone RN -   
2024 8:45 AM EDTFormatting   
of this note might be different   
from the original.  
Call Pain Management at   
[phone] once you have been   
scheduled for your MRI. We will   
then schedule a follow up   
appointment to review MRI report.  
Electronically signed by Rebeca Malone RN at 2024   
9:34 AM EDT  
  
  
  
  
The following attachments cannot   
be sent through Care   
Everywhere.MRI Scan   
(English)documented in this   
encounter                               [facility]  
   
                                        2024 Note     [id number]  
[id number]  
[id number]  
   
                                                    20[facility]   
Discharge instructions                    
  
  
Patient Education  
  
  
2024 15:33:07  
  
  
Hypogonadism, Male  
  
  
Hypogonadism, Male  
Male hypogonadism is a condition   
of having a level of testosterone   
that is lower than normal.   
Testosterone is a chemical, or   
hormone, that is made mainly in   
the testicles.  
In boys, testosterone is   
responsible for the development   
of male characteristics during   
puberty. These include:  
Making the penis bigger.  
Growing and building the muscles.  
Growing facial hair.  
Deepening the voice.  
In adult men, testosterone is   
responsible for maintaining:  
An interest in sex and the   
ability to have sex.  
Muscle mass.  
Sperm production.  
Red blood cell production.  
Bone strength.  
Testosterone also gives men   
energy and a sense of well-being.  
Testosterone normally decreases   
as men age and the testicles make   
less testosterone. Testosterone   
levels can vary from man to man.   
Not all men will have signs and   
symptoms of low testosterone.   
Weight, alcohol use, medicines,   
and certain medical conditions   
can affect a man's testosterone   
level.  
What are the causes?  
This condition is caused by:  
A natural decrease in   
testosterone that occurs as a man   
grows older. This is the main   
cause of this condition.  
Use of medicines, such as   
antidepressants, steroids, and   
opioids.  
Diseases and conditions that   
affect the testicles or the   
making of testosterone. These   
include:  
?Injury or damage to the   
testicles from trauma, cancer,   
cancer treatment, or infection.  
?Diabetes.  
?Sleep apnea.  
?Genetic conditions that men are   
born with.  
?Disease of the pituitary gland.   
This gland is in the brain. It   
produces hormones.  
?Obesity.  
?Metabolic syndrome. This is a   
group of diseases that affect   
blood pressure, blood sugar,   
cholesterol, and belly fat.  
?HIV or AIDS.  
?Alcohol abuse.  
?Kidney failure.  
?Other long-term or chronic   
diseases.  
What are the signs or symptoms?  
Common symptoms of this condition   
include:  
Loss of interest in sex (low sex   
drive).  
Inability to have or maintain an   
erection (erectile dysfunction).  
Feeling tired (fatigue).  
Mood changes, like irritability   
or depression.  
Loss of muscle and body hair.  
Infertility.  
Large breasts.  
Weight gain (obesity).  
How is this diagnosed?  
Your health care provider can   
diagnose hypogonadism based on:  
Your signs and symptoms.  
A physical exam to check your   
testosterone levels. This   
includes blood tests.   
Testosterone levels can change   
throughout the day. Levels are   
highest in the morning. You may   
need to have repeat blood tests   
before getting a diagnosis of   
hypogonadism.  
Depending on your medical history   
and test results, your health   
care provider may also do other   
tests to find the cause of low   
testosterone.  
How is this treated?  
This condition is treated with   
testosterone replacement therapy.   
Testosterone can be given by:  
Injection or through pellets   
inserted under the skin.  
Gels or patches placed on the   
skin or in the mouth.  
Testosterone therapy is not for   
everyone. It has risks and side   
effects. Your health care   
provider will consider your   
medical history, your risk for   
prostate cancer, your age, and   
your symptoms before putting you   
on testosterone replacement   
therapy.  
Follow these instructions at   
home:  
Take over-the-counter and   
prescription medicines only as   
told by your health care   
provider.  
Eat foods that are high in fiber,   
such as beans, whole grains, and   
fresh fruits and vegetables.   
Limit foods that are high in fat   
and processed sugars, such as   
fried or sweet foods.  
If you drink alcohol:  
?Limit how much you have to 0 2   
drinks a day.  
?Know how much alcohol is in your   
drink. In the [place] one drink   
equals one 12 oz bottle of beer   
(355 mL), one 5 oz glass of wine   
(148 mL), or one 1 oz glass of   
hard liquor (44 mL).  
Return to your normal activities   
as told by your health care   
provider. Ask your health care   
provider what activities are safe   
for you.  
Keep all follow-up visits. This   
is important.  
Contact a health care provider   
if:  
You have any of the signs or   
symptoms of low testosterone.  
You have any side effects from   
testosterone therapy.  
Summary  
Male hypogonadism is a condition   
of having a level of testosterone   
that is lower than normal.  
The natural drop in testosterone   
production that occurs with age   
is the most common cause of this   
condition.  
Low testosterone can also be   
caused by many diseases and   
conditions that affect the   
testicles and the making of   
testosterone.  
This condition is treated with   
testosterone replacement therapy.  
There are risks and side effects   
of testosterone therapy. Your   
health care provider will   
consider your age, medical   
history, symptoms, and risks for   
prostate cancer before putting   
you on testosterone therapy.  
This information is not intended   
to replace advice given to you by   
your health care provider. Make   
sure you discuss any questions   
you have with your health care   
provider.  
Document Revised: 2021   
Document Reviewed: 2021  
[employer] Patient Education    
[employer]  
  
  
  
Follow Up Care  
  
  
2024 13:42:53  
  
  
With:LIAN GALLAGEHR PA-C, URL  
Address:  
[facility] [address]  
[place] [address]  
[phone]  
  
When:  
Unknown                                 [facility]   
[facility]  
Work Phone:   
[phone]  
   
                                                    2024 History of   
Present illness Narrative               Formatting of this note is   
different from the original.  
[facility]  
Pain Management  
[address] [place] [address]  
Phone: [phone]  
  
Patient: Michi Tolentino  
Sex: male : 1995 Age: 28   
y.o.  
PCP: EFFIE FAM JR, DO  
  
2024  
Michi Tolentino is here for   
a(n) follow up. He reports his   
low back right leg pain has been   
increasing.  
  
Chief Complaint  
Patient presents with  
Back Pain  
  
HPI:  
PT/HEP 2023  
  
Back:  
21Bilateral L 4/5 5/1 MBB   
with 80-90% relief for 3-4 hours.  
21 Harish L 4/5 5/1 MBB w/mod   
relief  
22 Bilateral L4/5, 5/1 MBB   
w/ 100% relief x2 days  
22 Right and 22 Left   
L4/5, 5/1 RFA with 80% relief   
reported  
10/21/22 Right NRI L4, 5 with no   
relief reported.  
2023 caudal Epidural Steroid   
Injection with 50% relief x 2   
days.  
3/17/23 Right L2, 3 NRI with no   
relief  
23 Left and 23 Right   
L4/5, 5/ RFA with 50% relief  
  
Back Pain  
This is a chronic problem. The   
current episode started more than   
1 year ago (2020). The   
problem occurs constantly. The   
problem has been gradually   
worsening since onset. The pain   
is present in the gluteal, lumbar   
spine, sacro-iliac and thoracic   
spine. Quality: pressure,   
pulsing, dull. The pain does not   
radiate. Pain scale: 4/10   
increases to 7/10 w/standing.   
walking, ADLs. The pain is   
moderate. The pain is Worse   
during the day. The symptoms are   
aggravated by bending, sitting,   
twisting, standing and coughing   
(walking, stairs, lifting,   
leaning, push/pull,   
sneezing,transitioning, cold).   
Associated symptoms include   
headaches (x1 weekly), numbness   
(bilat shins) and tingling (bilat   
shins). Pertinent negatives   
include no bladder incontinence,   
bowel incontinence, chest pain,   
fever, leg pain or weakness. Risk   
factors include lack of exercise,   
obesity and poor posture. He has   
tried NSAIDs, muscle relaxant,   
ice and home exercises (tramadol   
mild relief, PT/HEP(), hx   
back sx, injections, activity   
modifications/avoidance;   
cymbalta, gabapentin, diclofenac,   
voltaren, aleve, IBU: mild relief   
w muscle relaxer, ) for the   
symptoms. The treatment provided   
moderate relief.  
  
The effect of pain on patient's   
ADLS: Moderate Impairment.  
  
Past Medical History:  
Diagnosis Date  
Chronic lower back pain  
Chronic pain disorder  
COVID 2022  
Depression  
Gait abnormality  
utilizes cane  
GERD (gastroesophageal reflux   
disease)  
Joint pain  
Low testosterone  
Migraine  
Numbness  
Obesity  
Sleep apnea  
  
Past Surgical History:  
Procedure Laterality Date  
BACK SURGERY 2021  
Dr. Yinka Ellison [facility]   
in [place]  
COLONOSCOPY  
ESOPHAGOGASTRODUODENOSCOPY  
INJECTION BLOCK EPIDURAL CAUDAL   
STEROID N/A 2023  
Performed by Mack Swain MD   
at [facility]  
INJECTION BLOCK NERVE MEDIAL   
BRANCH bilat L 4/5, 5/1 Bilateral   
2021  
Performed by Mack Swain MD   
at [facility]  
INJECTION BLOCK NERVE MEDIAL   
BRANCH: bilat L 4/5 5/1 Bilateral   
2022  
Performed by Mack Swain MD   
at [facility]  
INJECTION BLOCK SACROILIAC JOINT   
Bilateral 2021  
Performed by Mack Swain MD   
at [facility]  
INJECTION SPINE TRANSFORAMINAL   
Right L 2,3 Nroot Right 3/17/2023  
Performed by Mack Swain MD   
at [facility]  
INJECTION SPINE TRANSFORAMINAL   
Right L 4,5 Nroot Right   
10/21/2022  
Performed by Mack Swain MD   
at [facility]  
INJECTION SPINE TRANSFORAMINAL:   
right L 5,1 nroot Right   
12/15/2023  
Performed by Mack Swain MD   
at [facility]  
RADIOFREQUENCY ABLATION SPINAL:   
left L 4/5 5/1 Left 2023  
Performed by Mack Swain MD   
at [facility]  
RADIOFREQUENCY ABLATION SPINAL:   
left L 4/5 5/1 Left 2022  
Performed by Mack Swain MD   
at [facility]  
RADIOFREQUENCY ABLATION SPINAL:   
right L 4/5 5/1 Right 2023  
Performed by Mack Swain MD   
at [facility]  
RADIOFREQUENCY ABLATION SPINAL:   
right L 4/5 5/1 Right 2022  
Performed by Mack Swain MD   
at [facility]  
TONSILLECTOMY ADENOIDECTOMY  
  
Allergies  
Allergen Reactions  
Other Other (See Comments)  
Shellfish makes itch  
Iv dye ok pt had  
  
Shellfish Derived  
Itch-  
IV dye ok pt had  
  
Family History  
Problem Relation Age of Onset  
Hypertension Mother  
Cancer Mother  
Cancer Father  
Hypertension Father  
Cancer Maternal Grandmother  
Hypertension Maternal Grandmother  
Cancer Maternal Grandfather  
Hypertension Maternal Grandfather  
Cancer Paternal Grandmother  
Hypertension Paternal Grandmother  
Cancer Paternal Grandfather  
Hypertension Paternal Grandfather  
  
Social History  
  
Socioeconomic History  
Marital status: Single  
Spouse name: Not on file  
Number of children: Not on file  
Years of education: Not on file  
Highest education level: Not on   
file  
Occupational History  
Not on file  
Tobacco Use  
Smoking status: Never  
Smokeless tobacco: Current  
Types: Chew  
Vaping Use  
Vaping Use: Never used  
Substance and Sexual Activity  
Alcohol use: Yes  
Alcohol/week: 1.0 standard drink   
of alcohol  
Types: 1 Cans of beer per week  
Drug use: Yes  
Types: Marijuana, Other  
Comment: 7 grams per wk of   
marijuana  
Sexual activity: Defer  
Other Topics Concern  
Not on file  
Social History Narrative  
Not on file  
  
Social Determinants of Health  
  
Financial Resource Strain: Not on   
file  
Food Insecurity: No Food   
Insecurity (2024)  
Hunger Screening  
Food Insecurity - Worry: Never   
True  
Food Insecurity - Inability:   
Never True  
Transportation Needs: Not on file  
Physical Activity: Not on file  
Stress: Not on file  
Social Connections: Not on file  
Interpersonal Safety: Not on file  
Housing Instability: Not on file  
  
Review of Systems  
Constitutional: Negative for   
chills and fever.  
HENT: Negative.  
Eyes: Negative.  
Respiratory: Negative for cough   
and shortness of breath.  
Cardiovascular: Negative for   
chest pain.  
Gastrointestinal: Negative.   
Negative for bowel incontinence.  
Endocrine: Negative.  
Genitourinary: Negative. Negative   
for bladder incontinence.  
Musculoskeletal: Positive for   
back pain.  
Skin: Negative.  
Allergic/Immunologic: Negative.  
Neurological: Positive for   
tingling (bilat shins), numbness   
(bilat shins) and headaches (x1   
weekly). Negative for weakness.  
Hematological: Negative.  
Psychiatric/Behavioral: Negative.  
  
Vital Signs:  
Ht 190.5 cm (6' 3 )   Wt (!)   
170.6 kg (376 lb)   BMI 47.00   
kg/m  
  
Physical Exam:  
GENERAL - Healthy patient that   
appears stated age.  
HEENT - Normocephalic /   
Atraumatic, Extraoccular   
movements intact, trachea   
midline, thyroid within normal   
limits.  
CV - pulse regular, Warm   
extremities with appropriate   
color of nailbeds.  
RESP - No obvious wheezing, No   
Shortness of Breath, No   
overexertion response to exam   
maneuvers.  
COORDINATION - remains intact.  
PSYCH - Alert and Oriented x4,   
Attentive and appropriate,   
constitutionally normal, displays   
normal mood and affect per   
situation, answered questions   
appropriately during examination,   
demonstrated appropriate   
attention during discussion,   
demonstrated appropriate   
cognitive reasoning and   
understanding of the medical   
condition by asking appropriate   
questions regarding the diagnosis   
and risks/benefits/alternatives   
of treatment modalities. No   
obvious deficits in memory,   
reasoning, or intellect.  
Lumbar:  
SKIN - No rashes or bruising in   
the area of the patient s pain.  
LYMPH NODES - demonstrate no   
obvious enlargement.  
EXTREMITIES - Lower extremities   
are warm, with minimal edema and   
palpable pulses.  
Tenderness to palpation noted in   
the lumbar spine and paraspinal   
musculature. Pain is elicited   
with flexion, extension, and   
lateral rotation of the lumbar   
spine. Range of motion is   
diminished with these motions due   
to pain. Facet palpation is noted   
to be somewhat tender and facet   
loading maneuvers are mildly   
positive, but not concordant with   
the patient s normal pain   
complaints.  
STRENGTH - noted to be 5 out of 5   
all muscle groups bilateral lower   
extremities including muscles   
involving hip flexion and   
abduction, knee flexion and   
extension, as well as foot   
dorsiflexion and plantarflexion.  
No notable atrophy,   
fasciculations or spasm.  
SENSORY - No notable sensory   
deficits in the bilateral lower   
extremities to touch or pinprick   
in all dermatomal distributions.  
Straight Leg Raise is Positive on   
the Right  
Gait is normal.  
  
Assessment/Treatment Plan:  
Michi was seen today for back   
pain.  
  
Diagnoses and all orders for this   
visit:  
  
Lumbar radiculopathy  
- Case request operating room:   
INJECTION SPINE TRANSFORAMINAL:   
right [id number]  
  
Refill Lyrica 150 mg TID and   
continue Diclofenac 50 mg TID and   
Zanaflex 4 mg TID PRN  
  
Right L5, S1 Nerve Root Injection   
- under fluoroscopy with the use   
of contrast dye (unless   
contraindicated)  
It is hopeful that the described   
procedure will provide   
symptomatic pain relief. It is   
felt to be medically necessary   
noting that the patient has tried   
and failed more conservative   
modalities of therapy and this is   
the next most appropriate step.   
The procedure was described in   
detail to the patient as well as   
the potential benefits of pain   
reduction alongside risks of the   
procedure and alternatives. Risks   
were described as including, but   
not limited to bleeding,   
infection, nerve damage, spinal   
cord injury, paralysis, stroke,   
dural puncture headache, and   
medication reaction. The patient   
expressed understanding regarding   
the risks and benefits and wishes   
to proceed.  
It was explained that Nerve Root   
Injections and Transforaminal   
Epidural Injections often require   
a series of 2-3 before   
significant relief is noted, but   
we will determine after each   
injection if another one is   
indicated. Depending on the   
amount and duration of relief   
obtained from the injection,   
additional modalities of therapy   
including medications and   
physical therapy may need to be   
utilized alongside or following   
the injections.  
  
Follow up 2 weeks after procedure  
  
The medications I have prescribed   
have been reviewed for medication   
interactions/contraindications   
and/or for upcoming procedures:   
continue current medication   
regimen without any changes.  
  
DISCUSSION: Treatment options   
discussed with patient and all   
questions answered to patient's   
satisfaction. Discussed the rules   
and regulations surrounding   
prescription of opioids and   
compliance at length. Failure to   
follow the rules and regulation   
will result in tapering and   
discontinuation of medications if   
applicable. The patient has been   
instructed as to the type of   
medication prescribed along with   
directions for use. Potential   
side effects have been discussed,   
along with risks and benefits of   
taking this medication. (S)he was   
instructed as to what to do if   
(s)he experiences side effects,   
including when to discontinue the   
medication. (S)he was advised to   
call this office in this event.   
Also discussed at length safety   
and security of RX and   
medications. Due to the high risk   
nature of this patient's pain   
medication regimen, frequent   
office visit refill appointments   
(every 1-3 months) are medically   
necessary to monitor for an   
addiction disorder.  
  
Prescribed medication that   
requires intensive monitoring for   
toxicity Lyrica. OARRS was   
reviewed, discussed and   
appropriate for medications   
prescribed.  
  
Lyrica was refilled at today's   
office visit.  
  
It is noted that the patient did   
have good response from the   
previously performed procedure.   
It is felt that the patient would   
benefit from an additional   
procedure of the same nature in   
that the same symptoms have   
returned. It is hopeful that this   
additional injection will provide   
additional benefit and duration   
when combined with the previous   
injection.  
  
The spine model was demonstrated   
and MRI was reviewed and used to   
explain the condition.  
  
Chronic conditions not treated   
during this visit that affected   
my overall medical decision   
making:  
  
Comorbidity- Obesity  
The patient does have a comorbid   
condition of obesity. This will   
be taken into account in that   
obesity will contribute to   
certain pain conditions. It can   
contribute to pain from   
degenerative disc disease as well   
as osteoarthritis of the joints.   
Many neuropathic symptoms are   
also amplified due to axial spine   
loading. Special benefits will   
also need to be given to   
procedures. Many procedures are   
technically more difficult in the   
light of severe obesity. I will   
also consider the possibility of   
undiagnosed obstructive sleep   
apnea (which often accompanies   
obesity) when prescribing any   
narcotic medications. I will   
weigh the risks and benefits and   
fully discuss them with the   
patient for these reasons.  
  
Comorbidity- Anxiety  
The patient describes a   
significant issue with anxiety.   
Although treatment is helpful   
with this regard, the patient is   
likely need special accommodation   
due to this condition. For this   
reason, necessary procedures will   
likely need to be performed under   
sedation to decrease procedural   
anxiety.  
  
Comorbidity- Depression  
The patient has an ongoing issue   
with depression and currently   
feels these symptoms are under   
control and further feels that   
appropriate pain management would   
also help these symptoms. The   
patient is optimistic about the   
treatment plan we have laid out.   
We will continue to monitor these   
symptoms and remain cogniscent   
that they may affect the patients   
perceived improvement from the   
treatment and willingness to   
pursue further treatment. At this   
time the patient appears to be   
mentally and emotionally stable   
to undergo procedural and medical   
therapy. If any warning signs   
become present, I may refer the   
patient to a mental health   
professional for further   
evaluation.  
  
OARRS: Reviewed.  
  
Scribe Statement:  
  
Scribed for and in the presence   
of JACOB BURRIS by Chely Feliciano CNA.  
  
Provider Statement:  
  
I, JACOB BURRIS,   
personally performed the services   
described in the documentation,   
as scribed by Chely Feliciano CNA in   
my presence, and it is both   
accurate and complete.  
  
  
Chely Feliciano CNA  
24 [id number]  
  
  
JACOB Burris  
24 [id number]  
  
Electronically signed by JACOB Burris at 2024 3:06   
PM EST  
documented in this encounter            [facility] imageloop  
   
                                        2024 Instructions   
  
  
Chely Feliciano CNA - 2024   
1:15 PM ESTFormatting of this   
note is different from the   
original.  
Epidural Steroid Injection (SHAWNA)   
/ Nerve Root Injection / Nerve   
Block  
  
These procedure(s) involve the   
injection of a steroid and   
anesthetic into the epidural   
space or the nerve sheath that is   
both diagnostic and potentially   
therapeutic for alleviating   
discomfort of the legs and arms   
secondary to compression of the   
respective nerves due to bulging   
discs, bone spurs and other   
potential causes. Steroids are   
potent anti-inflammatory drugs   
that act to decrease the swollen   
and inflamed nerves thus   
relieving your clinical symptoms.  
  
How Long Will This Procedure   
Last?  
The extent and duration of pain   
relief may depend on the amount   
of inflammation and how many   
areas are involved. Other   
coexisting factors may be   
responsible for your pain.  
  
You and your physician will   
discuss expected results of   
procedure(s).  
  
After Your Injection  
You may experience soreness and   
tenderness at the area of   
treatment. This pain may not   
occur until later today after the   
numbing medicine wears off.  
  
The steroid can take 3-5 days to   
work and provide noticeable   
improvement.  
  
Activity  
You may feel temporary numbness,   
weakness or tingling:  
In the neck, arm, or fingertips   
(if your procedure was done in   
your neck)  
In the legs (if your procedure   
was done in your lower back)  
These symptoms are normal, and   
should subside within 3-4 hours.   
In that time, be careful to avoid   
falls.  
  
As a safety precaution, you must   
have a  after a lumbar   
nerve root injection, even if you   
do not receive sedation.  
  
Resume activity as tolerated when   
function has returned.  
  
Medications  
Resume your routine medications   
after your procedure.  
  
You may resume blood thinners per   
your regular schedule after the   
procedure.  
  
If you received sedation:  
If you received sedation for your   
procedure, you may feel sleepy or   
not yourself for several hours   
today. For the next 24 hours   
avoid activities that requires   
alertness or coordination. This   
includes:  
Driving or operating heavy   
machinery  
Using power tools  
Consuming alcohol  
Do not make important or complex   
decisions or sign legal documents   
in the next 24 hours.  
  
Other Instructions:  
If you feel severe pain at the   
injection site with swelling and   
redness, increased leg weakness,   
a fever of 101 or higher,   
headache (or worsening headache),   
changes in vision or urinary   
retention: Please call the office   
at [phone], or have   
someone take you to the nearest   
emergency room. Tell the   
emergency room staff that you   
recently had a spine injection. A   
doctor must evaluate you for   
bleeding and injection   
complications.  
  
If you lose control over bowel,   
bladder, or legs: Go to the   
nearest emergency room.  
  
If you are diabetic, the steroids   
used in this procedure can   
increase your blood sugar. If   
your blood sugar is 250mg/dL or   
higher, contact your primary care   
physician, or the doctor who   
manages your diabetes, to discuss   
how to get it back to normal.  
  
Electronically signed by Chely Feliciano CNA at 2024 2:17 PM   
EST  
  
documented in this encounter            [facility]  
   
                                                    2024 Miscellaneous   
Notes                                   Formatting of this note might be   
different from the original.  
Last Office Visit: 2023  
Next Office Visit: 2024  
Last Urine Drug Screen: No   
results found for:  BENZOSCRN   
  
OARRS appropriate  
  
  
Electronically signed by Estephanie Alan RN at 2024 10:13 AM   
EST  
documented in this encounter            [facility]  
   
                                                    2024 Telephone   
encounter Note                          Formatting of this note might be   
different from the original.  
Last Office Visit: 2023  
Next Office Visit: 2024  
Last Urine Drug Screen: No   
results found for:  BENZOSCRN   
  
OARRS appropriate  
  
  
Electronically signed by Estephanie Alan RN at 2024 10:13 AM   
EST  
                                        [facility]  
   
                                                    2024 History of   
Present illness Narrative               Formatting of this note is   
different from the original.  
Subjective  
Patient ID: Michi Tolentino is   
a 28 y.o. male who presents for   
Rash (Patient presents with   
complains of ongoing rash,   
possible ringworm, on left   
underarm since late-2023. Patient reports it burns   
when water comes into contact   
with it. Patient does not wear   
deodorant, stating he is allergic   
to ' most of them'. Patient   
states previous treatment   
included antibiotics and   
antifungal cream which did not   
provide relief. ).  
  
Rash  
This is a new problem. The   
current episode started more than   
1 month ago. The problem is   
unchanged. The affected locations   
include the left axilla. The rash   
is characterized by burning,   
pain, redness and draining. It is   
unknown if there was an exposure   
to a precipitant. Pertinent   
negatives include no diarrhea,   
fever, shortness of breath or   
vomiting. Treatments tried:   
clomitrazole cream and cefdinir.   
The treatment provided mild   
relief. There is no history of   
allergies, asthma or eczema.  
  
Review of Systems  
Constitutional: Negative for   
chills and fever.  
Respiratory: Negative for   
shortness of breath.  
Cardiovascular: Negative for   
chest pain.  
Gastrointestinal: Negative for   
diarrhea, nausea and vomiting.  
Musculoskeletal: Negative for   
myalgias.  
Skin: Positive for rash.  
All other systems reviewed and   
are negative.  
  
Objective  
Visit Vitals  
BP (!) 112/92  
Pulse 105  
Temp 97.9 F (Temporal)  
Wt 380 lb 6.4 oz  
SpO2 99%  
BMI 48.84 kg/m  
Smoking Status Never  
BSA 3.01 m  
  
Physical Exam  
Constitutional:  
General: He is not in acute   
distress.  
Appearance: Normal appearance.  
HENT:  
Head: Normocephalic and   
atraumatic.  
Mouth/Throat:  
Mouth: Mucous membranes are   
moist.  
Cardiovascular:  
Rate and Rhythm: Normal rate and   
regular rhythm.  
Pulses: Normal pulses.  
Heart sounds: No murmur heard.  
No friction rub. No gallop.  
Pulmonary:  
Effort: No respiratory distress.  
Breath sounds: Normal breath   
sounds. No wheezing, rhonchi or   
rales.  
Skin:  
General: Skin is warm and dry.  
Findings: Rash (left axillary   
erythema with an isolated annular   
patch; no discharge) present.  
Neurological:  
General: No focal deficit   
present.  
Mental Status: He is alert and   
oriented to person, place, and   
time.  
Psychiatric:  
Mood and Affect: Mood normal.  
Behavior: Behavior normal.  
Judgment: Judgment normal.  
  
Assessment/Plan  
Diagnoses and all orders for this   
visit:  
  
Intertrigo  
- predniSONE (Deltasone) 20 MG   
tablet; Take 2 tablets for 5   
days, then take 1 tablet for 5   
days by mouth  
- Ambulatory referral to   
Dermatology; Future  
Ringworm  
- ketoconazole (NIZOral) 2 %   
cream; Apply topically Daily  
- Ambulatory referral to   
Dermatology; Future  
Electronically signed by JACOB Howard at 2024 11:13 AM   
EST  
documented in this encounter            [facility]  
   
                                        2024 Instructions   
  
  
JACOB Howard - 2024   
8:30 AM ESTFormatting of this   
note might be different from the   
original.  
Follow up with dermatology as   
directed for further evaluation;   
more extensive testing may be   
completed. Culture was unable to   
obtained due to lack of   
discharge. New medications as   
directed. Advised lukewarm   
showers. Avoid potential   
irritants. Rash can worsen with   
stress, scented soaps,   
scratching, dry skin, changes in   
weather, and skin infections.   
Instructions were given to   
contact the office if the rash   
worsens or fails to improve   
despite several weeks of   
treatment or if skin infection   
develops (yellow, honey-colored   
crusting). All questions   
answered. Call the office with   
any other questions or concerns.  
Electronically signed by JACOB Howard at 2024 11:13 AM   
EST  
  
documented in this encounter            [facility]  
   
                                                    2024 Miscellaneous   
Notes                                   Formatting of this note might be   
different from the original.  
Last Office Visit: 2023  
Next Office Visit: 2024  
Last Urine Drug Screen: No   
results found for:  BENZOSCRJERILYN   
  
OARRS N/A  
  
  
Electronically signed by Melony Samuel RN at 2024 2:35   
PM EST  
documented in this encounter            [facility]  
   
                                                    2024 Telephone   
encounter Note                          Formatting of this note might be   
different from the original.  
Last Office Visit: 2023  
Next Office Visit: 2024  
Last Urine Drug Screen: No   
results found for:  LISA FRANK N/A  
  
  
Electronically signed by Melony Samuel RN at 2024 2:35   
PM [facility]  
   
                                        2023 Note     Patient Education Ma  
terials   
Follows:Disease  
Body Ringworm  
Body ringworm is an infection of   
the skin that often causes a   
ring-shaped rash. Body ringworm   
is also called tinea corporis.  
Body ringworm can affect any part   
of your skin. This condition is   
easily spread from person to   
person (is very contagious).  
What are the causes?  
This condition is caused by fungi   
called dermatophytes. The   
condition develops when these   
fungi grow out of control on the   
skin.  
You can get this condition if you   
touch a person or animal that has   
it. You can also get it if you   
share any items with an infected   
person or pet. These include:  
? Clothing, bedding, and towels.  
? Brushes or garcia.  
? Gym equipment.  
? Any other object that has the   
fungus on it.  
What increases the risk?  
You are more likely to develop   
this condition if you:  
? Play sports that involve close   
physical contact, such as   
wrestling.  
? Sweat a lot.  
? Live in areas that are hot and   
humid.  
? Use public showers.  
? Have a weakened immune system.  
What are the signs or symptoms?  
(Inserted Image. Unable to   
display)  
Symptoms of this condition   
include:  
? Itchy, raised red spots and   
bumps.  
? Red scaly patches.  
? A ring-shaped rash. The rash   
may have:  
? A clear center.  
? Scales or red bumps at its   
center.  
? Redness near its borders.  
? Dry and scaly skin on or around   
it.  
How is this diagnosed?  
This condition can usually be   
diagnosed with a skin exam. A   
skin scraping may be taken from   
the affected area and examined   
under a microscope to see if the   
fungus is present.  
How is this treated?  
This condition may be treated   
with:  
? An antifungal cream or   
ointment.  
? An antifungal shampoo.  
? Antifungal medicines. These may   
be prescribed if your ringworm:  
? Is severe.  
? Keeps coming back.  
? Lasts a long time.  
Follow these instructions at   
home:  
? Take over-the-counter and   
prescription medicines only as   
told by your health care   
provider.  
? If you were given an antifungal   
cream or ointment:  
? Use it as told by your health   
care provider.  
? Wash the infected area and dry   
it completely before applying the   
cream or ointment.  
? If you were given an antifungal   
shampoo:  
? Use it as told by your health   
care provider.  
? Leave the shampoo on your body   
for 3?5 minutes before rinsing.  
? While you have a rash:  
? Wear loose clothing to stop   
clothes from rubbing and   
irritating it.  
? Wash or change your bed sheets   
every night.  
? Disinfect or throw out items   
that may be infected.  
? Wash clothes and bed sheets in   
hot water.  
? Wash your hands often with soap   
and water. If soap and water are   
not available, use hand   
.  
? If your pet has the same   
infection, take your pet to see a   
 for treatment.  
How is this prevented?  
? Take a bath or shower every day   
and after every time you work out   
or play sports.  
? Dry your skin completely after   
bathing.  
? Wear sandals or shoes in public   
places and showers.  
? Change your clothes every day.  
? Wash athletic clothes after   
each use.  
? Do not share personal items   
with others.  
? Avoid touching red patches of   
skin on other people.  
? Avoid touching pets that have   
bald spots.  
? If you touch an animal that has   
a bald spot, wash your hands.  
Contact a health care provider   
if:  
? Your rash continues to spread   
after 7 days of treatment.  
? Your rash is not gone in 4   
weeks.  
? The area around your rash gets   
red, warm, tender, and swollen.  
Summary  
? Body ringworm is an infection   
of the skin that often causes a   
ring-shaped rash.  
? This condition is easily spread   
from person to person (is very   
contagious).  
? This condition may be treated   
with antifungal cream or   
ointment, antifungal shampoo, or   
antifungal medicines.  
? Take over-the-counter and   
prescription medicines only as   
told by your health care   
provider.  
This information is not intended   
to replace advice given to you by   
your health care provider. Make   
sure you discuss any questions   
you have with your health care   
provider.  
Document Revised: 10/11/2022   
Document Reviewed: 10/11/2022  
[employer] Patient Education ?    
[employer]                           [facility]  
   
                                                    2023 History of   
Present illness Narrative               Formatting of this note is   
different from the original.  
[facility]  
Pain Management  
[address] [place] [address]  
Phone: [phone]  
  
Patient: Michi Tolentino  
Sex: male : 1995 Age: 28   
y.o.  
PCP: EFFIE FAM JR, DO  
  
2023  
Michi Tolentino is here for   
a(n) post procedure follow up   
12/15/2023 right L5,1 nerve root   
injection with 60-70%   
improvement. Patient is walking   
with cane, but states he can do   
more. Patient complains of   
increase pressure to right lumbar   
region and severe numbness and   
tingling to bilateral feet.  
  
Chief Complaint  
Patient presents with  
Back Pain  
  
HPI:  
PT/HEP 2023  
  
Back:  
21Bilateral L 4/5 5/1 MBB   
with 80-90% relief for 3-4 hours.  
21 Harish L 4/5 5/1 MBB w/mod   
relief  
22 Bilateral L4/5, 5/1 MBB   
w/ 100% relief x2 days  
22 Right and 22 Left   
L4/5, 5/1 RFA with 80% relief   
reported  
10/21/22 Right NRI L4, 5 with no   
relief reported.  
2023 caudal Epidural Steroid   
Injection with 50% relief x 2   
days.  
3/17/23 Right L2, 3 NRI with no   
relief  
23 Left and 23 Right   
L4/5, 5/ RFA with 50% relief  
  
Back Pain  
This is a chronic problem. The   
current episode started more than   
1 year ago (2020). The   
problem occurs intermittently   
(thoracic pain is intermittent.   
RLE is constant pressure). The   
problem has been gradually   
improving (post L 5, 1 NR   
injection) since onset. The pain   
is present in the gluteal and   
lumbar spine (right). Quality:   
pressure. The pain does not   
radiate. The pain is at a   
severity of 3/10. The pain is   
moderate. The pain is Worse   
during the day (worse depending   
on activity). The symptoms are   
aggravated by bending, sitting,   
twisting, standing and coughing   
(walking, stairs, lifting,   
leaning, push/pull,   
sneezing,transitioning, cold).   
Stiffness is present: n/a.   
Associated symptoms include   
headaches (migaines 2-3 times per   
week), numbness (bilat feet) and   
tingling (bilat feet). Pertinent   
negatives include no bladder   
incontinence, bowel incontinence,   
chest pain, fever or weakness.   
(Reports lots of pressure to   
right lumbar. N/T is severe to   
bilat feet ) Risk factors include   
lack of exercise, obesity and   
poor posture. He has tried   
NSAIDs, muscle relaxant, ice and   
home exercises (tramadol mild   
relief, PT/HEP(), hx back sx,   
injections, activity   
modifications/avoidance;   
cymbalta, gabapentin, diclofenac,   
voltaren, aleve, IBU: mild relief   
w muscle relaxer, ) for the   
symptoms. The treatment provided   
moderate relief.  
  
The effect of pain on patient's   
ADLS: Moderate Impairment.  
  
Past Medical History:  
Diagnosis Date  
Chronic lower back pain  
Chronic pain disorder  
COVID 2022  
Depression  
Gait abnormality  
utilizes cane  
GERD (gastroesophageal reflux   
disease)  
Joint pain  
Low testosterone  
Migraine  
Numbness  
Obesity  
Sleep apnea  
  
Past Surgical History:  
Procedure Laterality Date  
BACK SURGERY 2021  
Dr. Yinka Ellison [facility]   
in [place]  
COLONOSCOPY  
ESOPHAGOGASTRODUODENOSCOPY  
INJECTION BLOCK EPIDURAL CAUDAL   
STEROID N/A 2023  
Performed by Mack Swain MD   
at [facility]  
INJECTION BLOCK NERVE MEDIAL   
BRANCH bilat L 4/5, 5 Bilateral   
2021  
Performed by Mack Swain MD   
at [facility]  
INJECTION BLOCK NERVE MEDIAL   
BRANCH: bilat L 4/5  Bilateral   
2022  
Performed by Mack Swain MD   
at [facility]  
INJECTION BLOCK SACROILIAC JOINT   
Bilateral 2021  
Performed by Mack Swain MD   
at [facility]  
INJECTION SPINE TRANSFORAMINAL   
Right L 2,3 Nroot Right 3/17/2023  
Performed by Mack Swain MD   
at [facility]  
INJECTION SPINE TRANSFORAMINAL   
Right L 4,5 Nroot Right   
10/21/2022  
Performed by Mack Swain MD   
at [facility]  
INJECTION SPINE TRANSFORAMINAL:   
right L 5,1 nroot Right   
12/15/2023  
Performed by Mack Swain MD   
at [facility]  
RADIOFREQUENCY ABLATION SPINAL:   
left L 4/5 5/1 Left 2023  
Performed by Mack Swain MD   
at [facility]  
RADIOFREQUENCY ABLATION SPINAL:   
left L 4/5 5/1 Left 2022  
Performed by Mack Swain MD   
at [facility]  
RADIOFREQUENCY ABLATION SPINAL:   
right L 4/5 5/1 Right 2023  
Performed by Mack Swain MD   
at [facility]  
RADIOFREQUENCY ABLATION SPINAL:   
right L 4/5 5/1 Right 2022  
Performed by Mack Swain MD   
at [facility]  
TONSILLECTOMY ADENOIDECTOMY  
  
Allergies  
Allergen Reactions  
Other Other (See Comments)  
Shellfish makes itch  
Iv dye ok pt had  
  
Shellfish Derived  
Itch-  
IV dye ok pt had  
  
Family History  
Problem Relation Age of Onset  
Hypertension Mother  
Cancer Mother  
Cancer Father  
Hypertension Father  
Cancer Maternal Grandmother  
Hypertension Maternal Grandmother  
Cancer Maternal Grandfather  
Hypertension Maternal Grandfather  
Cancer Paternal Grandmother  
Hypertension Paternal Grandmother  
Cancer Paternal Grandfather  
Hypertension Paternal Grandfather  
  
Social History  
  
Socioeconomic History  
Marital status: Single  
Spouse name: Not on file  
Number of children: Not on file  
Years of education: Not on file  
Highest education level: Not on   
file  
Occupational History  
Not on file  
Tobacco Use  
Smoking status: Never  
Smokeless tobacco: Current  
Types: Chew  
Vaping Use  
Vaping Use: Never used  
Substance and Sexual Activity  
Alcohol use: Yes  
Alcohol/week: 1.0 standard drink   
of alcohol  
Types: 1 Cans of beer per week  
Drug use: Yes  
Types: Marijuana, Other  
Comment: 7 grams per wk of   
marijuana  
Sexual activity: Defer  
Other Topics Concern  
Not on file  
Social History Narrative  
Not on file  
  
Social Determinants of Health  
  
Financial Resource Strain: Not on   
file  
Food Insecurity: No Food   
Insecurity (2023)  
Hunger Screening  
Food Insecurity - Worry: Never   
True  
Food Insecurity - Inability:   
Never True  
Transportation Needs: Not on file  
Physical Activity: Not on file  
Stress: Not on file  
Social Connections: Not on file  
Interpersonal Safety: Not on file  
  
Review of Systems  
Constitutional: Negative for   
fever.  
HENT: Negative for congestion.  
Respiratory: Negative for cough   
and shortness of breath.  
Cardiovascular: Negative for   
chest pain.  
Gastrointestinal: Negative for   
bowel incontinence, constipation   
and diarrhea.  
Genitourinary: Negative for   
bladder incontinence, difficulty   
urinating, frequency and urgency.  
Musculoskeletal: Positive for   
back pain.  
Skin: Negative.  
Neurological: Positive for   
tingling (bilat feet), numbness   
(bilat feet) and headaches   
(migaines 2-3 times per week).   
Negative for weakness.  
Psychiatric/Behavioral: Negative   
for agitation. The patient is not   
nervous/anxious.  
  
Vital Signs:  
/84   Pulse 94   Resp 16     
Ht 190.5 cm (6' 3 )   Wt (!)   
170.6 kg (376 lb)   SpO2 94%     
BMI 47.00 kg/m  
  
Physical Exam:  
GENERAL - Healthy patient that   
appears stated age.  
HEENT - Normocephalic /   
Atraumatic, Extraoccular   
movements intact, trachea   
midline, thyroid within normal   
limits.  
CV - pulse regular, Warm   
extremities with appropriate   
color of nailbeds.  
RESP - No obvious wheezing, No   
Shortness of Breath, No   
overexertion response to exam   
maneuvers.  
COORDINATION - remains intact.  
PSYCH - Alert and Oriented x4,   
Attentive and appropriate,   
constitutionally normal, displays   
normal mood and affect per   
situation, answered questions   
appropriately during examination,   
demonstrated appropriate   
attention during discussion,   
demonstrated appropriate   
cognitive reasoning and   
understanding of the medical   
condition by asking appropriate   
questions regarding the diagnosis   
and risks/benefits/alternatives   
of treatment modalities. No   
obvious deficits in memory,   
reasoning, or intellect.  
Lumbar:  
SKIN - No rashes or bruising in   
the area of the patient s pain.  
LYMPH NODES - demonstrate no   
obvious enlargement.  
EXTREMITIES - Lower extremities   
are warm, with minimal edema and   
palpable pulses.  
Tenderness to palpation noted in   
the lumbar spine and paraspinal   
musculature. Pain is elicited   
with flexion, extension, and   
lateral rotation of the lumbar   
spine. Range of motion is   
diminished with these motions due   
to pain. Facet palpation is noted   
to be somewhat tender and facet   
loading maneuvers are mildly   
positive, but not concordant with   
the patient s normal pain   
complaints.  
STRENGTH - noted to be 5 out of 5   
all muscle groups bilateral lower   
extremities including muscles   
involving hip flexion and   
abduction, knee flexion and   
extension, as well as foot   
dorsiflexion and plantarflexion.  
No notable atrophy,   
fasciculations or spasm.  
SENSORY - No notable sensory   
deficits in the bilateral lower   
extremities to touch or pinprick   
in all dermatomal distributions.  
Straight Leg Raise is Negative.  
Gait is normal.  
  
Assessment/Treatment Plan:  
Michi was seen today for back   
pain.  
  
Diagnoses and all orders for this   
visit:  
  
Lumbosacral spondylosis without   
myelopathy  
- pregabalin (LYRICA) 150 mg   
capsule; Take 1 capsule (150 mg   
total) by mouth 3 (three) times a   
day.  
  
Monitor  
  
Refill Lyrica 150 mg TID  
  
The medications I have prescribed   
have been reviewed for medication   
interactions/contraindications   
and/or for upcoming procedures:   
continue current medication regim  
en without any changes.  
  
Follow up in 2 months.  
DISCUSSION: Treatment options   
discussed with patient and all   
questions answered to patient's   
satisfaction. Discussed the rules   
and regulations surrounding   
prescription of opioids and   
compliance at length. Failure to   
follow the rules and regulation   
will result in tapering and   
discontinuation of medications if   
applicable. The patient has been   
instructed as to the type of   
medication prescribed along with   
directions for use. Potential   
side effects have been discussed,   
along with risks and benefits of   
taking this medication. (S)he was   
instructed as to what to do if   
(s)he experiences side effects,   
including when to discontinue the   
medication. (S)he was advised to   
call this office in this event.   
Also discussed at length safety   
and security of RX and   
medications. Due to the high risk   
nature of this patient's pain   
medication regimen, frequent   
office visit refill appointments   
(every 1-3 months) are medically   
necessary to monitor for an   
addiction disorder.  
  
Prescribed medication that   
requires intensive monitoring for   
toxicity: Lyrica. OARRS was   
reviewed, discussed and   
appropriate for medications   
prescribed.  
  
Chronic conditions not treated   
during this visit that affected   
my overall medical decision   
making: Obesity  
  
The spine model was demonstrated   
and MRI was reviewed and used to   
explain the condition.  
  
OARRS: Reviewed.  
  
Scribe Statement: Scribed for and   
in the presence of JACOB BURRIS by Netta Acosta RN.  
  
Provider Statement: I, JACOB BURRIS, personally   
performed the services described   
in the documentation, as scribed   
by Netta Acosta RN in my presence,   
and it is both accurate and   
complete.  
  
Netta Acosta RN  
23 [id number]  
  
  
Chely Feliciano CNA  
23 [phone]  
  
  
JACOB Burris  
[phone]  
  
Electronically signed by JACOB Burris at 2023 3:56   
PM EST  
documented in this encounter            [facility]  
   
                                        10- Note     HNO ID: [id number]  
Author: Babar Castillo APRN.CNP  
Service: ?  
Author Type: Nurse Practitioner  
Type: Progress Notes  
Filed: 10/23/2023 2:15 PM  
Note Text:  
VIRTUAL SPINE SURGERY ESTABLISHED   
PATIENT  
This is a virtual visit using   
MyChart Zoom Video Visit. It   
required  
patient-provider interaction for   
the medical decision making as   
documented  
below.  
I have communicated my name and   
active licensure. The patient's   
identity  
and physical location were   
verified at the time of this   
visit. Either the  
patient or their legal   
representative has been informed   
of the risks and  
benefits of -- and alternatives   
to -- treatment through a remote  
evaluation and consents to   
proceed with the evaluation   
remotely.  
SERVICE DATE: 10/23/2023  
DATE OF LAST VISIT: 2023:   
 Michi Tolentino is a 27 year   
old male with  
CC of back, bilateral buttock and   
RLE pain, onset 2022,   
progressively  
getting worse with numbness and   
reported RLE pain. Exam with   
weakness as  
outline as above, pain may be   
affecting parts of the exam. Hx   
of right  
L4-5 laminotomy and   
foraminotomies with decompression   
and discectomy on  
2021 that somewhat improved   
pre-op LE but worsened back pain.   
Pt  
reports back pain much better   
after 2022 RFA however he   
reports he  
worsened 2022 and has continued   
to get worse. Michi Tolentino   
has a  
condition that requires further   
workup. 2022 MRI lumbar spine   
WITHOUT  
contrast demontrated L2/3 right   
paracentral disc protrusion   
contributing  
to some right lateral recess   
stenosis; right L4/5 expected   
post op  
changes. Otherwise there is no   
significant finding on MRI.   
Discussed  
with pt that based on currently   
MRI, I did not see any surgically   
relevant  
pathology.  Recommendations:  
Imaging: Lumbar MRI With and   
Without Contrast Postop recurring   
or  
worsening symptoms. Sx   
progressively worsening since MRI   
was done.  
Possible new HNP to be   
considered. Recommended contrast   
to be used as  
last MRI wo contrast.  
Follow up: TBD pending MRI.   
Advised pt I would send   
impression in [place]  
via message  
MRI was denied.  
Pt referred to PT and was advised   
to follow up after if not feeling   
better  
SUBJECTIVE  
Michi Tolentino is a 28 year old   
male presenting alone.  
CHIEF COMPLAINT: acute pain x2-3   
weeks  
He reports he was doing better,   
very well, until 2-3 weeks ago.  
Had RFA over the summer that he   
reported  immensely  helpful  
Prior to acute onset of pain, he   
was having really no radiating   
leg pain;  
a little but of discomfort but so   
much better.  
Was off pain medications   
(gabapentin, tylenol, diclofenac)   
except HS doses  
as wasn't needing during the day  
Was in PT, doing well.  
Was up to walking 1.5 miles x3   
weekly  
Goals of loosing weight, getting   
into shape.  
He had an umbilical hernia repair   
4 weeks ago  
Stopped PT d/t this surgery and   
subsquestn recovery.  
He was doing laundry ~ 2 weeks   
ago and lifted the laundry basket   
that was  
unexpectedly really heavy.  
Developed acute severe pain, had   
to call family to come help him.  
He started PT last week after   
being cleared from the hernia   
surgery.  
Due to the increased pain, he was   
told by his physical therapist he   
should  
stop PT and follow up with spine  
Has not been by PCP, pain   
management, etc since acute pain   
started 2 weeks  
ago  
Oral steroids in the past only   
with side effects, no benefit   
ever  
Overall, his pain is no better or   
worse since the laundry basket   
incident  
2-3 weeks ago  
Went to a football game yesterday   
for his 10yo nephew and could not  
tolerate sitting for more than 15   
minutes, repositioning frequently   
due to  
pain  
RLE feels heavy  
+ RLE n/t  
Saw urology in the past, given   
bladder medication with good   
benefit. No  
b/b issues since/otherwise  
Pain Radiation: midline upper   
lumbar/TL junction spine,   
bilateral low  
back, R>L buttocks, R>L LE pain   
to the great toe  
Subjective weakness: Yes  
Numbness/tingling: Yes  
Bowel/bladder dysfunction: No  
DERMATOMAL DISTRIBUTION:  
Right: L5  
PREVIOUS CONSERVATIVE TREATMENTS:  
Muscle relaxer - uses sparingly   
due to fatigue with taking  
Membrane stabilizer: Gabapentin   
800mg TID  
NSAIDS - diclofenac, aleve,   
ibuprofen  
Antidepressant: cymbalta  
Physical therapy: summer 2023 to   
current  
Injections  
21 Bilateral L 4/5 5/1 MBB  
21 Harish L 4/5 5/1 MBB  
22 Bilateral L4/5, 5/ MBB  
22 Right and 22 Left   
L4/5, 5/ RFA - pt reported great   
benefit  
10/21/22 Right NRI L4, 5 - pt   
reported zero benefit  
2023 caudal Epidural Steroid   
Injection - pt reported zero   
benefit  
3/17/23 Right L2, 3 NRI - pt   
reported zero benefit  
23 Left L4/5,  RFA  
23 Right L4/5, 5 RFA  
MEDICATIONS:  
pregabalin (LYRICA) 150 mg   
capsule Take 1 capsule by mouth   
three times a  
day for 30 days.  
topiramate (TOPAMAX) 100 mg   
tablet Take 100 mg by mouth twice   
daily.  
DULoxetine (CYMBALTA) 60 mg   
capsule 1 capsule.  
tiZANidine (ZANAFLEX) 4 mg tablet   
take 1 tablet by mouth every 8   
hours if  
needed for muscle spasm  
QUEtiapin (more content not   
included)...                            [facility]  
   
                                                    10- History of   
Present illness Narrative               Formatting of this note is   
different from the original.  
VIRTUAL SPINE SURGERY ESTABLISHED   
PATIENT  
  
This is a virtual visit using   
[employer] Zoom Video Visit. It   
required patient-provider   
interaction for the medical   
decision making as documented   
below.  
  
I have communicated my name and   
active licensure. The patient's   
identity and physical location   
were verified at the time of this   
visit. Either the patient or   
their legal representative has   
been informed of the risks and   
benefits of -- and alternatives   
to -- treatment through a remote   
evaluation and consents to   
proceed with the evaluation   
remotely.  
  
SERVICE DATE: 10/23/2023  
  
DATE OF LAST VISIT: 2023:   
 Michi Tolentino is a 27 year   
old male with CC of back,   
bilateral buttock and RLE pain,   
onset 2022, progressively   
getting worse with numbness and   
reported RLE pain. Exam with   
weakness as outline as above,   
pain may be affecting parts of   
the exam. Hx of right L4-5   
laminotomy and foraminotomies   
with decompression and discectomy   
on 2021 that somewhat   
improved pre-op LE but worsened   
back pain. Pt reports back pain   
much better after 2022 RFA   
however he reports he worsened   
2022 and has continued to get   
worse. Michi Tolentino has a   
condition that requires further   
workup. 2022 MRI lumbar spine   
WITHOUT contrast demontrated L2/3   
right paracentral disc protrusion   
contributing to some right   
lateral recess stenosis; right   
L4/5 expected post op changes.   
Otherwise there is no significant   
finding on MRI. Discussed with pt   
that based on currently MRI, I   
did not see any surgically   
relevant pathology.    
Recommendations:  
Imaging: Lumbar MRI With and   
Without Contrast Postop recurring   
or worsening symptoms. Sx   
progressively worsening since MRI   
was done. Possible new HNP to be   
considered. Recommended contrast   
to be used as last MRI wo   
contrast.  
Follow up: TBD pending MRI.   
Advised pt I would send   
impression in [place] via message  
  
MRI was denied.  
Pt referred to PT and was advised   
to follow up after if not feeling   
better  
  
SUBJECTIVE  
Michi Tolentino is a 28 year old   
male presenting alone.  
  
CHIEF COMPLAINT: acute pain x2-3   
weeks  
  
He reports he was doing better,   
very well, until 2-3 weeks ago.  
Had RFA over the summer that he   
reported  immensely  helpful  
Prior to acute onset of pain, he   
was having really no radiating   
leg pain; a little but of   
discomfort but so much better.  
Was off pain medications   
(gabapentin, tylenol, diclofenac)   
except HS doses as wasn't needing   
during the day  
Was in PT, doing well.  
Was up to walking 1.5 miles x3   
weekly  
Goals of loosing weight, getting   
into shape.  
  
He had an umbilical hernia repair   
4 weeks ago  
Stopped PT d/t this surgery and   
subsquestn recovery.  
  
He was doing laundry ~ 2 weeks   
ago and lifted the laundry basket   
that was unexpectedly really   
heavy.  
Developed acute severe pain, had   
to call family to come help him.  
  
He started PT last week after   
being cleared from the hernia   
surgery.  
Due to the increased pain, he was   
told by his physical therapist he   
should stop PT and follow up with   
spine  
  
Has not been by PCP, pain   
management, etc since acute pain   
started 2 weeks ago  
Oral steroids in the past only   
with side effects, no benefit   
ever  
  
Overall, his pain is no better or   
worse since the laundry basket   
incident 2-3 weeks ago  
Went to a football game yesterday   
for his 10yo nephew and could not   
tolerate sitting for more than 15   
minutes, repositioning frequently   
due to pain  
  
RLE feels heavy  
+ RLE n/t  
  
Saw urology in the past, given   
bladder medication with good   
benefit. No b/b issues   
since/otherwise  
  
Pain Radiation: midline upper   
lumbar/TL junction spine,   
bilateral low back, R>L buttocks,   
R>L LE pain to the great toe  
  
Subjective weakness: Yes  
Numbness/tingling: Yes  
Bowel/bladder dysfunction: No  
  
DERMATOMAL DISTRIBUTION:  
Right: L5  
  
PREVIOUS CONSERVATIVE TREATMENTS:  
Muscle relaxer - uses sparingly   
due to fatigue with taking  
Membrane stabilizer: Gabapentin   
800mg TID  
NSAIDS - diclofenac, aleve,   
ibuprofen  
Antidepressant: cymbalta  
Physical therapy: summer 2023 to   
current  
Injections  
21 Bilateral L 4/5 5/1 MBB  
21 Harish L 4/5 5/1 MBB  
22 Bilateral L4/5, 5/1 MBB  
22 Right and 22 Left   
L4/5, 5/1 RFA - pt reported great   
benefit  
10/21/22 Right NRI L4, 5 - pt   
reported zero benefit  
2023 caudal Epidural Steroid   
Injection - pt reported zero   
benefit  
3/17/23 Right L2, 3 NRI - pt   
reported zero benefit  
23 Left L4/5, 5/ RFA  
23 Right L4/5, 5 RFA  
  
MEDICATIONS:  
pregabalin (LYRICA) 150 mg   
capsule Take 1 capsule by mouth   
three times a day for 30 days.  
topiramate (TOPAMAX) 100 mg   
tablet Take 100 mg by mouth twice   
daily.  
DULoxetine (CYMBALTA) 60 mg   
capsule 1 capsule.  
tiZANidine (ZANAFLEX) 4 mg tablet   
take 1 tablet by mouth every 8   
hours if needed for muscle spasm  
QUEtiapine (SEROQUEL) 100 mg   
tablet Take 150 mg by mouth daily   
at bedtime.  
tamsulosin (FLOMAX) 0.4 mg Take   
0.4 mg by mouth once daily.  
lamoTRIgine (LAMICTAL) 25 mg   
tablet Take 50 mg by mouth once   
daily.  
oxybutynin ER (DITROPAN XL) 15 mg   
24 hr Extended Rel Tab Take 15 mg   
by mouth once daily.  
pantoprazole DR (PROTONIX) 40 mg   
tablet take 1 tablet by mouth   
once daily for 30  
clomiPHENe (SEROPHENE) 50 mg   
tablet Take 25 mg by mouth.  
diclofenac potassium (CATAFLAM)   
50 mg tablet 1 tablet Orally 3   
times a day as needed  
NURTEC ODT 75 mg disintegrating   
tablet take 1 tablet by mouth AT   
ONSET OF MIGRAINE FOR 30 DAYS  
  
Patient Entered Questionnaires  
  
Spine Questions 10/23/2023  
Pain Location: Lower back  
Pain Duration: 1 to 5 years  
Pain over last 6 months: Every   
day or nearly every day in the   
past 6 months  
Symptoms from neck/cervical   
spine: No  
Employment Status: Unknown  
Off work 1 month or more due to   
back/neck pain: Yes  
Applied for/receive disability/WC   
due to low back/neck pain No  
Involved in law suit/legal claim:   
No  
  
  
  
  
  
PROMIS Score Percentiles  
Physical Health 10/23/2023  
Physical Function Percentile 4**  
Sleep Percentile 18*  
Fatigue Percentile 46  
Pain Interference Percentile 1**  
  
PROMIS SOCIAL ROLE SCORE   
10/23/2023  
Social Role Satisfaction   
Percentile 2**  
  
PROMIS Global Health Scale   
10/23/2023  
Physical Health Percentile 10**  
Mental Health Percentile 5**  
  
Percentiles provide an indication   
of how the patient's score ranks   
in relation to the general   
population. Higher percentile   
rankings indicate better   
function/quality of life. 50th   
percentile is the average of the   
general population and indicates   
half of respondents had a worse   
score.  
  
Depression Screening:  
PHQ-9 10/23/2023  
Score 7  
  
PHQ-9 Self-harm Question   
10/23/2023  
Thoughts that you would be better   
off dead, or of hurting yourself   
in some way 0  
  
PHQ-9 Self-Harm (Item 9) response   
options:  
0 Not at all  
1 Several days  
2 More than half the days  
3 Nearly every day  
  
PHQ-9 Levels:  
0-4 No to mild depression  
5-9 Mild depression  
10-14 Moderate depression  
15-19 Moderately severe   
depression  
20-27 Severe depression  
  
OBJECTIVE:  
VIRTUAL PHYSICAL EXAM  
  
GENERAL APPEARANCE: Well   
nourished, well developed, and no   
apparent distress.  
NEURO PSYCH: Patient oriented to   
person, place, and time. Mood   
pleasant. Benign affect.  
  
DATA REVIEW  
[facility] records independently   
reviewed  
Imaging and outside records   
independently reviewed  
Images independently reviewed   
with the patient  
  
ASSESSMENT/PLAN  
(Z98.890) History of back surgery   
(primary encounter diagnosis)  
(M54.16) Acute right lumbar   
radiculopathy  
  
29 yo M with CC of acute back and   
right L5 distribution   
pain/numbness/heaviness, in the   
setting of chronic back and RLE   
pain with hx of right L4-5   
laminotomy and foraminotomies   
with decompression and discectomy   
on 2021.  
  
Michi Tolentino will continue   
with medical management of   
his/her condition.  
  
Switch from gabapentin 800mg TID   
to lyrica 150mg TID. Indications,   
dosing and SE reviewed  
Ok to resume PT - consider giving   
it a few days for lyrica to kick   
in  
No oral steroids given lack of   
success historically  
If pain not improving, consider   
TFESI with pain management - will   
send copy of this note today to   
his providers  
Follow up: Following above.   
Request he send clinical update   
via [employer] in ~ one week  
Future consideration discussed:   
MRI lumbar spine WWO contrast if   
failure to improve  
  
Cc:  
Mack Swain Jr., MD as Pain   
Management (Anesthesiology)  
Balbir Ronquillo as Pain   
Management (Pain Management)  
  
I spent a total of 42 minutes on   
the date of the service which   
included preparing to see the   
patient, face-to-face patient   
care, completing clinical   
documentation, obtaining and/or   
reviewing separately obtained   
history, counseling and educating   
the patient/family/caregiver,   
ordering medications, tests, or   
procedures, communicating with   
other HCPs (not separately   
reported), and independently   
interpreting results (not   
separately reported).  
  
SIGNATURE: Babar Castillo APRN.CNP  
Spine [place] PATIENT NAME:   
Michi Tolentino  
DATE: 2023 MRN:   
[id number]  
TIME: 1:40 PM PAGER:  
  
Electronically signed by Babar Castillo APRN.CNP at 10/23/2023   
2:15 PM EDT  
documented in this encounter            [facility]  
   
                                                    2023 Hospital   
Discharge instructions                    
  
  
Patient Education  
  
  
2023 14:06:13  
  
  
Hypogonadism, Male  
  
  
Hypogonadism, Male  
Male hypogonadism is a condition   
of having a level of testosterone   
that is lower than normal.   
Testosterone is a chemical, or   
hormone, that is made mainly in   
the testicles.  
In boys, testosterone is   
responsible for the development   
of male characteristics during   
puberty. These include:  
Making the penis bigger.  
Growing and building the muscles.  
Growing facial hair.  
Deepening the voice.  
In adult men, testosterone is   
responsible for maintaining:  
An interest in sex and the   
ability to have sex.  
Muscle mass.  
Sperm production.  
Red blood cell production.  
Bone strength.  
Testosterone also gives men   
energy and a sense of well-being.  
Testosterone normally decreases   
as men age and the testicles make   
less testosterone. Testosterone   
levels can vary from man to man.   
Not all men will have signs and   
symptoms of low testosterone.   
Weight, alcohol use, medicines,   
and certain medical conditions   
can affect a man's testosterone   
level.  
What are the causes?  
This condition is caused by:  
A natural decrease in   
testosterone that occurs as a man   
grows older. This is the main   
cause of this condition.  
Use of medicines, such as   
antidepressants, steroids, and   
opioids.  
Diseases and conditions that   
affect the testicles or the   
making of testosterone. These   
include:  
?Injury or damage to the   
testicles from trauma, cancer,   
cancer treatment, or infection.  
?Diabetes.  
?Sleep apnea.  
?Genetic conditions that men are   
born with.  
?Disease of the pituitary gland.   
This gland is in the brain. It   
produces hormones.  
?Obesity.  
?Metabolic syndrome. This is a   
group of diseases that affect   
blood pressure, blood sugar,   
cholesterol, and belly fat.  
?HIV or AIDS.  
?Alcohol abuse.  
?Kidney failure.  
?Other long-term or chronic   
diseases.  
What are the signs or symptoms?  
Common symptoms of this condition   
include:  
Loss of interest in sex (low sex   
drive).  
Inability to have or maintain an   
erection (erectile dysfunction).  
Feeling tired (fatigue).  
Mood changes, like irritability   
or depression.  
Loss of muscle and body hair.  
Infertility.  
Large breasts.  
Weight gain (obesity).  
How is this diagnosed?  
Your health care provider can   
diagnose hypogonadism based on:  
Your signs and symptoms.  
A physical exam to check your   
testosterone levels. This   
includes blood tests.   
Testosterone levels can change   
throughout the day. Levels are   
highest in the morning. You may   
need to have repeat blood tests   
before getting a diagnosis of   
hypogonadism.  
Depending on your medical history   
and test results, your health   
care provider may also do other   
tests to find the cause of low   
testosterone.  
How is this treated?  
This condition is treated with   
testosterone replacement therapy.   
Testosterone can be given by:  
Injection or through pellets   
inserted under the skin.  
Gels or patches placed on the   
skin or in the mouth.  
Testosterone therapy is not for   
everyone. It has risks and side   
effects. Your health care   
provider will consider your   
medical history, your risk for   
prostate cancer, your age, and   
your symptoms before putting you   
on testosterone replacement   
therapy.  
Follow these instructions at   
home:  
Take over-the-counter and   
prescription medicines only as   
told by your health care   
provider.  
Eat foods that are high in fiber,   
such as beans, whole grains, and   
fresh fruits and vegetables.   
Limit foods that are high in fat   
and processed sugars, such as   
fried or sweet foods.  
If you drink alcohol:  
?Limit how much you have to 0 2   
drinks a day.  
?Know how much alcohol is in your   
drink. In the [place] one drink   
equals one 12 oz bottle of beer   
(355 mL), one 5 oz glass of wine   
(148 mL), or one 1 oz glass of   
hard liquor (44 mL).  
Return to your normal activities   
as told by your health care   
provider. Ask your health care   
provider what activities are safe   
for you.  
Keep all follow-up visits. This   
is important.  
Contact a health care provider   
if:  
You have any of the signs or   
symptoms of low testosterone.  
You have any side effects from   
testosterone therapy.  
Summary  
Male hypogonadism is a condition   
of having a level of testosterone   
that is lower than normal.  
The natural drop in testosterone   
production that occurs with age   
is the most common cause of this   
condition.  
Low testosterone can also be   
caused by many diseases and   
conditions that affect the   
testicles and the making of   
testosterone.  
This condition is treated with   
testosterone replacement therapy.  
There are risks and side effects   
of testosterone therapy. Your   
health care provider will   
consider your age, medical   
history, symptoms, and risks for   
prostate cancer before putting   
you on testosterone therapy.  
This information is not intended   
to replace advice given to you by   
your health care provider. Make   
sure you discuss any questions   
you have with your health care   
provider.  
Document Revised: 2021   
Document Reviewed: 2021  
[employer] Patient Education    
[employer]  
  
  
  
Follow Up Care  
  
  
2023 14:13:11  
  
  
With:DEZ ANDERSON, LIAN LAMAS, URL  
Address:  
[address] [facility] [place] [address]  
  
When:Within 6 Month(s)  
Comments:w/T F&T, FSH, and LH           [facility]   
[facility]  
Work Phone:   
[phone]  
   
                                        2023 Note     HNO ID: [id number]  
Author: KAMALJIT Chapa.CNP  
Service: ?  
Author Type: Nurse Practitioner  
Type: Progress Notes  
Filed: 5/10/2023 10:26 AM  
Note Text:  
SPINE SURGERY NEW PATIENT  
This is an in person visit  
DATE OF SERVICE: May 9, 2023  
This is an in-person visit.  
REFERRING PROVIDER: Dr. Effie Fam for Disc   
displacement, lumbar  
SUBJECTIVE  
HISTORY OF PRESENT ILLNESS:  
Michi Tolentino is a 27 year old   
male presenting with partner,   
Nicki.  
CHIEF COMPLAINT: pain  
2019? developed back issues after   
bending - developed acute onset   
of BLE  
numbness and inability to walk.  
He reports his girlfriend took   
him to the ED and was dc  
Had trouble getting MRI but   
finally did  
Had an EMG  
Told he needed surgery  
Pre-op sx back pain, leg pain and   
leg numbness  
Underwent right L4-5 laminotomy   
and foraminotomies with   
decompression and  
discectomy on 2021 at [facility]  
His girlfriend reports he was   
worse after surgery - a lot of   
back pain  
He reports numbness in the legs   
reduced; leg pain less after   
surgery  
He reports he used a brace 9   
months post op  
He reports he has had a lot of PT   
and was told there was not much   
they  
could do for him  
Pool therapy was closed due to PT  
Had lumbar RFA 2022 and he   
reports great success for his   
pain with this.  
Was working in a office 2022,   
bent to get something off the   
floor and  
developed worsening sx  
Reports worsening back and RLE   
pain  
Now having trouble lifting heavy   
items since  
Reports 2-3 injections since with   
no benefit - pain worse the first   
week  
after  
MRI completed  
PAIN EVALUATION  
2023  
1459  
Pain Level: 7  
Pain Location: Back-Lower  
left hip, right leg, buttocks  
Description:   
Burning;Aching;Numbness;Stabbing;  
Tingling  
Duration Amount of Time: 3.5  
Duration Units: Years  
Frequency: Continuous  
Intervention/Comfort measure:   
Heat;Exercise;Cold;Medication;Rel  
axation  
Pain Radiation: midline low back,   
bilateral buttock, right   
posterolateral  
hip, right anterior thigh to the   
right lower leg to the top of the   
right  
foot and the great toe  
- denies left leg pain  
Aggravating Factors: Walking,   
lifting, prolonged sitting,   
prolonged  
sleeping, laying on the right   
side  
Alleviating Factors: Lying   
supine, cold  
Subjective weakness: yes - RLE  
Numbness/tingling: Yes - RLE  
Patient goals for visit today:   
wants a fix  
DERMATOMAL DISTRIBUTION:  
Right: L5  
AMBULATORY STATUS: Independent   
[employer] - using a   
cane since  
2022,usingmore recently  
PREVIOUS CONSERVATIVE TREATMENTS:  
Muscle relaxer  
Oral steroids  
Membrane stabilizer: Gabapentin   
600mg TID  
NSAIDS - diclofenac, aleve,   
ibuprofen  
Antidepressant: cymbalta  
Injections  
21 Bilateral L 4/5 5/1 MBB  
21 Harish L 4/5 5/1 MBB  
22 Bilateral L4/5, 5/1 MBB  
22 Right and 22 Left   
L4/5, 5/1 RFA - pt reported great   
benefit  
10/21/22 Right NRI L4, 5 - pt   
reported zero benefit  
2023 caudal Epidural Steroid   
Injection - pt reported zero   
benefit  
3/17/23 Right L2, 3 NRI - pt   
reported zero benefit  
There is no problem list on file   
for this patient.  
PAST MEDICAL HISTORY  
Diagnosis Date  
Chronic back pain  
GERD (gastroesophageal reflux   
disease)  
Marijuana smoker, continuous  
Morbid obesity (HCC)  
Sleep apnea  
PAST SURGICAL HISTORY  
Procedure Laterality Date  
BACK SURGERY HX Right 2021  
right L4-5 laminotomy and   
foraminotomies with decompression   
and  
discectomy on 2021 at [facility]  
Social History  
Tobacco Use  
Smoking status: Former  
Types: Cigarettes  
Smokeless tobacco: Current  
Types: Chew  
Substance Use Topics  
Drug use: Yes  
Types: Marijuana  
ALLERGIES  
Allergen Reactions  
Shellfish Containin* Itching  
MEDICATIONS:  
topiramate (TOPAMAX) 100 mg   
tablet Take 100 mg by mouth twice   
daily.  
DULoxetine (CYMBALTA) 60 mg   
capsule 1 capsule.  
tiZANidine (ZANAFLEX) 4 mg tablet   
take 1 tablet by mouth every 8   
hours if  
needed for muscle spasm  
gabapentin (NEURONTIN) 600 mg   
tablet take 1 tablet by mouth   
three times a  
day for 30 DAYS for 30  
QUEtiapine (SEROQUEL) 100 mg   
tablet Take 150 mg by mouth daily   
at  
bedtime.  
tamsulosin (FLOMAX) 0.4 mg Take   
0.4 mg by mouth once daily.  
lamoTRIgine (LAMICTAL) 25 mg   
tablet Take 50 mg by mouth once   
daily.  
oxybutynin ER (DITROPAN XL) 15 mg   
24 hr Extended Rel Tab Take 15 mg   
by  
mouth once daily.  
pantoprazole DR (PROTONIX) 40 mg   
tablet take 1 tablet by mouth   
once daily  
for 30  
clomiPHENe (SEROPHENE) 50 mg   
tablet Take 25 mg by mouth.  
diclofenac potassium (CATAFLAM)   
50 mg tablet 1 tablet Orally 3   
times a  
day as needed  
NURTEC ODT 75 mg disintegrating   
tablet take 1 tablet by mouth AT   
ONSET OF  
MIGRAINE FOR 30 DAYS  
iv contrast (will be provided   
with radiology test) MRI LSP   
Inject,  
intravenously, once for 1 dose.   
No IV access, insert saline lock   
prior to  
the beginning of sedation,   
infusion, injection of imaging   
exam.  
Discontinue saline lock post   
exam. If Pt. has a central line   
or IVAD, may  
access for administration accord   
(more content not included)...          [facility]  
   
                                        2023 Instructions   
  
  
KAMALJIT Chapa.CNP -   
2023 3:53 PM EDTFormatting   
of this note might be different   
from the original.  
HOW TO SEND US YOUR IMAGES  
  
Contact the imaging facility   
where the images were completed   
and ask if they have PACs   
(picture archiving and   
communication system). If they   
have PACs, request them to send   
the images directly through their   
computer via PACs to [facility].  
  
If the location who did your   
imaging does not have PACs,   
request a CD with the images. You   
can upload the images from the CD   
to [facility] via your home   
computer.  
  
To upload images to the secure   
[facility] website please   
click on the following link:   
[email or web address] and   
follow the instructions.  
  
The other option is to mail the   
images on CD WITH report for   
review to:  
  
KAMALJIT Griffith.ALBERTO Mireles  
[address]  
Mailcode: [id number]  
[place] [address]  
  
Once you have sent or uploaded   
the images, (after uploading, it   
takes 24-28 hours to be viewable   
to us), please notify office via   
phone [phone] or via [employer]   
that imaging has been sent so we   
know to look for it.  
- Please allow 7 business days   
for image review  
- After the images have been   
reviewed, you will be contacted   
by the office with impression   
through [employer] message  
  
  
Electronically signed by KAMALJIT Chapa.ALBERTO at 2023   
3:53 PM EDT  
  
documented in this encounter            [facility]  
   
                                                    2023 History of   
Present illness Narrative               Formatting of this note is   
different from the original.  
Images from the original note   
were not included.  
SPINE SURGERY NEW PATIENT  
  
This is an in person visit  
  
DATE OF SERVICE: May 9, 2023  
  
This is an in-person visit.  
  
REFERRING PROVIDER: Dr. Effie Fam for Disc   
displacement, lumbar  
  
SUBJECTIVE  
HISTORY OF PRESENT ILLNESS:  
Michi Tolentino is a 27 year old   
male presenting with partner,   
Nicki.  
  
CHIEF COMPLAINT: pain  
  
2019? developed back issues after   
bending - developed acute onset   
of BLE numbness and inability to   
walk.  
He reports his girlfriend took   
him to the ED and was dc  
Had trouble getting MRI but   
finally did  
Had an EMG  
Told he needed surgery  
Pre-op sx back pain, leg pain and   
leg numbness  
  
Underwent right L4-5 laminotomy   
and foraminotomies with   
decompression and discectomy on   
2021 at [facility]  
  
His girlfriend reports he was   
worse after surgery - a lot of   
back pain  
He reports numbness in the legs   
reduced; leg pain less after   
surgery  
He reports he used a brace 9   
months post op  
  
He reports he has had a lot of PT   
and was told there was not much   
they could do for him  
Pool therapy was closed due to PT  
  
Had lumbar RFA 2022 and he   
reports great success for his   
pain with this.  
  
Was working in a office 2022,   
bent to get something off the   
floor and developed worsening sx  
Reports worsening back and RLE   
pain  
  
Now having trouble lifting heavy   
items since  
  
Reports 2-3 injections since with   
no benefit - pain worse the first   
week after  
MRI completed  
  
PAIN EVALUATION  
  
2023  
1459  
  
  
  
Pain Level: 7  
Pain Location: Back-Lower  
left hip, right leg, buttocks  
Description:   
Burning;Aching;Numbness;Stabbing;  
Tingling  
Duration Amount of Time: 3.5  
Duration Units: Years  
Frequency: Continuous  
Intervention/Comfort measure:   
Heat;Exercise;Cold;Medication;Rel  
axation  
  
  
  
Pain Radiation: midline low back,   
bilateral buttock, right   
posterolateral hip, right   
anterior thigh to the right lower   
leg to the top of the right foot   
and the great toe  
- denies left leg pain  
  
Aggravating Factors: Walking,   
lifting, prolonged sitting,   
prolonged sleeping, laying on the   
right side  
  
Alleviating Factors: Lying   
supine, cold  
  
Subjective weakness: yes - RLE  
Numbness/tingling: Yes - RLE  
  
Patient goals for visit today:   
wants a fix  
  
DERMATOMAL DISTRIBUTION:  
Right: L5  
  
AMBULATORY STATUS: Independent   
Community Distances - using a   
cane since 2022,usingmore   
recently  
  
PREVIOUS CONSERVATIVE TREATMENTS:  
Muscle relaxer  
Oral steroids  
Membrane stabilizer: Gabapentin   
600mg TID  
NSAIDS - diclofenac, aleve,   
ibuprofen  
Antidepressant: cymbalta  
Injections  
21 Bilateral L 4/5 5/1 MBB  
21 Harish L 4/5 5/1 MBB  
22 Bilateral L4/5, 5/1 MBB  
22 Right and 22 Left   
L4/5, 5/1 RFA - pt reported great   
benefit  
10/21/22 Right NRI L4, 5 - pt   
reported zero benefit  
2023 caudal Epidural Steroid   
Injection - pt reported zero   
benefit  
3/17/23 Right L2, 3 NRI - pt   
reported zero benefit  
  
There is no problem list on file   
for this patient.  
PAST MEDICAL HISTORY  
Diagnosis Date  
Chronic back pain  
GERD (gastroesophageal reflux   
disease)  
Marijuana smoker, continuous  
Morbid obesity (HCC)  
Sleep apnea  
  
PAST SURGICAL HISTORY  
Procedure Laterality Date  
BACK SURGERY HX Right 2021  
right L4-5 laminotomy and   
foraminotomies with decompression   
and discectomy on 2021 at   
[facility]  
  
Social History  
  
Tobacco Use  
Smoking status: Former  
Types: Cigarettes  
Smokeless tobacco: Current  
Types: Chew  
Substance Use Topics  
Drug use: Yes  
Types: Marijuana  
  
ALLERGIES  
Allergen Reactions  
Shellfish Containin* Itching  
  
MEDICATIONS:  
topiramate (TOPAMAX) 100 mg   
tablet Take 100 mg by mouth twice   
daily.  
DULoxetine (CYMBALTA) 60 mg   
capsule 1 capsule.  
tiZANidine (ZANAFLEX) 4 mg tablet   
take 1 tablet by mouth every 8   
hours if needed for muscle spasm  
gabapentin (NEURONTIN) 600 mg   
tablet take 1 tablet by mouth   
three times a day for 30 DAYS for   
30  
QUEtiapine (SEROQUEL) 100 mg   
tablet Take 150 mg by mouth daily   
at bedtime.  
tamsulosin (FLOMAX) 0.4 mg Take   
0.4 mg by mouth once daily.  
lamoTRIgine (LAMICTAL) 25 mg   
tablet Take 50 mg by mouth once   
daily.  
oxybutynin ER (DITROPAN XL) 15 mg   
24 hr Extended Rel Tab Take 15 mg   
by mouth once daily.  
pantoprazole DR (PROTONIX) 40 mg   
tablet take 1 tablet by mouth   
once daily for 30  
clomiPHENe (SEROPHENE) 50 mg   
tablet Take 25 mg by mouth.  
diclofenac potassium (CATAFLAM)   
50 mg tablet 1 tablet Orally 3   
times a day as needed  
NURTEC ODT 75 mg disintegrating   
tablet take 1 tablet by mouth AT   
ONSET OF MIGRAINE FOR 30 DAYS  
iv contrast (will be provided   
with radiology test) MRI LSP   
Inject, intravenously, once for 1   
dose. No IV access, insert saline   
lock prior to the beginning of   
sedation, infusion, injection of   
imaging exam. Discontinue saline   
lock post exam. If Pt. has a   
central line or IVAD, may access   
for administration according to   
line specific nursing protocol.   
Once exam is complete flush line   
and de-access according to line   
specific nursing protocol in the   
MR contrast administration   
guidelines link.  
  
OBJECTIVE:  
PHYSICAL EXAM  
/88   Pulse 109   Resp 18     
Ht 190.5 cm (6' 3 )   Wt (!)   
165.9 kg (365 lb 12.8 oz)   BMI   
45.72 kg/m  
  
GENERAL APPEARANCE: Morbidly   
obese.  
NEURO PSYCH: Patient oriented to   
person, place, and time. Mood   
pleasant. Benign affect.  
MOTOR: Hip Flexors Right: 3,   
Left: 5  
Knee Extensors Right 3 and Left 5  
Long Toe Extensors Right: 4,   
Left: 2  
Ankle Plantar Flexors Right: 5,   
Left: 5  
ADF Right 4; Left 5  
GAIT: Antalgic. Can almost but   
not quite stand without using his   
arms  
REFLEXES: Knee jerk Right: 0,   
Left: 0.  
  
DATA REVIEW  
Imaging and outside records   
independently reviewed  
Images independently reviewed   
with the patient  
  
MRI lumbar spine report   
2022: At L2-3 grossly stable   
moderate central canal narrowing   
and mild bilateral foraminal   
narrowing secondary to mild   
diffuse disc bulge and mild right   
paracentral protrusion. At L4-5   
mild foraminal narrowing   
bilaterally secondary to mild   
degenerative disc disease which   
appears to have progressed   
slightly  
  
ASSESSMENT/PLAN  
M54.16 Radiculopathy of lumbar   
region (primary encounter   
diagnosis)  
M54.41, G89.29 Chronic midline   
low back pain with right-sided   
sciatica  
R20.0 Right leg numbness  
Z98.890 History of back surgery  
  
Michi Tolentino is a 27 year old   
male with CC of back, bilateral   
buttock and RLE pain, onset   
2022, progressively getting   
worse with numbness and reported   
RLE pain. Exam with weakness as   
outline as above, pain may be   
affecting parts of the exam. Hx   
of right L4-5 laminotomy and   
foraminotomies with decompression   
and discectomy on 2021 that   
somewhat improved pre-op LE but   
worsened back pain. Pt reports   
back pain much better after   
2022 RFA however he reports he   
worsened 2022 and has continued   
to get worse.  
  
Michi Tolentino has a condition   
that requires further workup.   
2022 MRI lumbar spine WITHOUT   
contrast demontrated L2/3 right   
paracentral disc protrusion   
contributing to some right   
lateral recess stenosis; right   
L4/5 expected post op changes.   
Otherwise there is no significant   
finding on MRI. Discussed with pt   
that based on currently MRI, I   
did not see any surgically   
relevant pathology.  
  
Imaging: Lumbar MRI With and   
Without Contrast Postop recurring   
or worsening symptoms. Sx   
progressively worsening since MRI   
was done. Possible new HNP to be   
considered. Recommended contrast   
to be used as last MRI wo   
contrast.  
Follow up: TBD pending MRI.   
Advised pt I would send   
impression in [place] via message  
  
I spent a total of 45 minutes on   
the date of the service which   
included preparing to see the   
patient, face-to-face patient   
care, completing clinical   
documentation, obtaining and/or   
reviewing separately obtained   
history, performing a medically   
appropriate examination,   
counseling and educating the   
patient/family/caregiver,   
ordering medications, tests, or   
procedures, and independently   
interpreting results (not   
separately reported).  
  
SIGNATURE: Babar Castillo APRN.CNP   
PATIENT NAME: Michi Tolentino  
DATE: May 9, 2023 MRN: [id number]  
TIME: 3:19 PM PAGER:  
  
Electronically signed by Babar Castillo APRN.CNP at 05/10/2023   
10:26 AM EDT  
documented in this encounter            [facility]  
   
                                        2023 Note     HNO ID: [id number]  
Author: Alyssa Floyd PA-C  
Service: ?  
Author Type: Physician Assistant  
Type: Progress Notes  
Filed: 2023 2:45 PM  
Note Text:  
Per Triage:  
Michi Tolentino is a 27 year   
old male that requests evaluation   
of  
spine. Per review, they have   
symptoms of back pain, right leg   
pain.  
Difficulty walking/uses a cane.   
Numbness right leg. Weakness.  
BMI 45  
Request: 1st available  
Referring provider: Dr. Effie Fam  
Patient out of state: no  
2nd opinion: no  
Prior spine surgery: yes  
 Lumbar [facility]  
(closed)  
[address] [place] [address]  
CMT:  
Injections  
Muscle relaxer  
Gabapentin  
NSAIDS  
Studies (Reports unless   
indicated)  
MRI lumbar spine report   
2022:  
At L2-3 grossly stable moderate   
central canal narrowing and mild   
bilateral  
foraminal narrowing secondary to   
mild diffuse disc bulge and mild   
right  
paracentral protrusion. At L4-5   
mild foraminal narrowing   
bilaterally  
secondary to mild degenerative   
disc disease which appears to   
have  
progressed slightly  
Disposition:  
Based on triage, recommend   
patient be scheduled with   
surgical [facility] for  
eval. Unsure if symptoms   
correlate. May need lumbar x-rays   
and CT lumbar  
spine due to previous surgery  
If patient would like sooner   
appointment, is it okay to offer   
appointment  
with spine surgical [facility] No (If   
patient is okay to see first   
available  
surgeon, please specify dx to aid   
in appropriate scheduling, such   
as  
?cervical degenerative disease,?   
?scoliosis?)  
If VV, please advise pt to send   
or upload relevant outside images   
prior to  
appt so they will be available   
for review during the appt  
If office visit, please advise pt   
to hand carry relevant images on   
CD to  
the appt so they can be reviewed   
during the appt  
Alyssa Floyd PA-C                     [facility]  
   
                                                    2023 History of   
Present illness Narrative               Formatting of this note is   
different from the original.  
Per Triage:  
Michi D [place] is a 27 year   
old male that requests evaluation   
of spine. Per review, they have   
symptoms of back pain, right leg   
pain. Difficulty walking/uses a   
cane. Numbness right leg.   
Weakness.  
  
BMI 45  
  
Request: 1st available  
  
Referring provider: Dr. Effie Fam  
  
Patient out of state: no  
  
2nd opinion: no  
  
Prior spine surgery: yes  
 [facility]bar [facility] (closed)  
[address] [place]   
[address]  
  
CMT:  
Injections  
Muscle relaxer  
Gabapentin  
NSAIDS  
  
Studies (Reports unless   
indicated)  
  
MRI lumbar spine report   
2022:  
At L2-3 grossly stable moderate   
central canal narrowing and mild   
bilateral foraminal narrowing   
secondary to mild diffuse disc   
bulge and mild right paracentral   
protrusion. At L4-5 mild   
foraminal narrowing bilaterally   
secondary to mild degenerative   
disc disease which appears to   
have progressed slightly  
  
Disposition:  
Based on triage, recommend   
patient be scheduled with   
surgical [facility] for eval. Unsure if   
symptoms correlate. May need   
lumbar x-rays and CT lumbar spine   
due to previous surgery  
  
If patient would like sooner   
appointment, is it okay to offer   
appointment with spine surgical   
[facility] No (If patient is okay to see   
first available surgeon, please   
specify dx to aid in appropriate   
scheduling, such as cervical   
degenerative disease, scoliosis )  
  
If VV, please advise pt to send   
or upload relevant outside images   
prior to appt so they will be   
available for review during the   
appt  
  
If office visit, please advise pt   
to hand carry relevant images on   
CD to the appt so they can be   
reviewed during the appt  
  
Alyssa Floyd PA-C  
  
Electronically signed by Alyssa Floyd PA-C at 2023 2:45   
PM EDT  
Formatting of this note is   
different from the original.  
  
Patient name: Michi Tolentino  
MRN: [id number]  
  
Are you being referred by a   
[facility]   
or Pain Management Provider at   
[facility]? No  
If answer is  YES  please   
schedule directly with surgeon,   
triage does not need to be   
completed.  
  
Is this a self-referral No  
If not, who is the Referring   
Provider Dr. Effie Fam  
  
Is this a 2nd opinion? No  
Were you offered surgery? No  
  
MRI/CT/myelogram within 12   
months? Yes  
If  NO , please refer to medical   
spine or PCP to complete above   
imaging,  
triage does not need to be   
completed  
  
If  YES, please ask for the   
name/address of the facility   
where the MRI/CT/myelogram was  
completed:  
MRI [facility]  
[address] [place]   
[address]  
MRI/CT/myelogram viewable in   
Epic: No  
If not, please provide   
[phone] to fax in imaging   
reports for review. Also,  
please inform patient to hand   
carry imaging disc to   
appointment.  
  
XR (spine) within 12 months: No  
If  YES, please ask for the   
name/address of the facility   
where the XR was  
completed:  
  
Dr. Murphy's patients: Have you had   
previous EMG/Nerve Conduction   
Study, Ultrasound, or MRI for   
these same symptoms?  
If  YES, please ask for the   
name/address of the facility   
where they were completed:  
  
Requested provider (First and   
Last name): unknown  
  
Are you interested in a virtual   
visit if offered? Yes  
  
1. Where are you having symptoms   
related to this visit? Lumbar   
spine  
Back pain Yes  
Leg pain Yes R leg  
Arm pain No  
Neck pain No  
  
  
2. Are you having any of the   
following symptoms:  
Difficulty walking Yes uses a   
cane  
Numbness Yes R leg  
Weakness Yes  
Trouble using your hands? Yes     
if I'm having a bad back day   
  
3. Have you had any injections or   
physical therapy in the last 12   
months? Yes  
If  YES  then please ask for the   
name/address of the facility   
where the  
injections and/or physical   
therapy was completed  
Injections [facility]  
[address] [place] [address]  
  
Have you tried any other kinds of   
non-surgical treatments in the   
last 12 months? (For example:   
NSAIDS, muscle relaxants,   
analgesics, oral steroids,   
Chiropractor, Acupuncture):   
muscle relaxants, gabapentin,   
NSAIDS  
  
  
4. Are you currently taking daily   
prescribed narcotic medications   
for your current symptoms (For   
example Oxycodone, Hydrocodone,   
Tramadol, Morphine, Other)? No  
  
5. Have you had previous spinal   
surgery for this same symptoms?   
Yes  
If  YES please ask for the name   
of facility/address of where the   
surgery  
was completed:  
 [facility] (closed)  
[address] [place]   
[address]  
  
Additional Comments [phone]  
  
  
Electronically signed by Erich Marroquin at 2023 2:45 PM EDT  
documented in this encounter            [facility]  
   
                                        2023 Note     HNO ID: [id number]  
Author: Erich Marroquin  
Service: ?  
Author Type: ?  
Type: Progress Notes  
Filed: 2023 2:45 PM  
Note Text:  
Patient name: Michi Tolentino  
MRN: [id number]  
Are you being referred by a   
[facility] Provider   
or Pain  
Management Provider at [facility]? No  
If answer is  YES  please   
schedule directly with surgeon,   
triage does not  
need to be completed.  
Is this a self-referral No  
If not, who is the Referring   
Provider Dr. Effie Fam  
Is this a 2nd opinion? No  
Were you offered surgery? No  
MRI/CT/myelogram within 12   
months? Yes  
If  NO , please refer to medical   
spine or PCP to complete above   
imaging,  
triage does not need to be   
completed  
If  YES,? please ask for the   
name/address of the facility   
where the  
MRI/CT/myelogram was  
completed:  
MRI [facility] [id number]  
MRI/CT/myelogram viewable in   
Epic: No  
If not, please provide   
[phone] to fax in imaging   
reports  
for review. Also,  
please inform patient to hand   
carry imaging disc to   
appointment.  
XR (spine) within 12 months: No  
If  YES,? please ask for the   
name/address of the facility   
where the XR was  
completed:  
Dr. Murphy's patients: Have you had   
previous EMG/Nerve Conduction   
Study,  
Ultrasound, or MRI for these same   
symptoms?  
If  YES,? please ask for the   
name/address of the facility   
where they were  
completed:  
Requested provider (First and   
Last name): unknown  
Are you interested in a virtual   
visit if offered? Yes  
1. Where are you having symptoms   
related to this visit? Lumbar   
spine  
Back pain Yes  
Leg pain Yes R leg  
Arm pain No  
Neck pain No  
2. Are you having any of the   
following symptoms:  
Difficulty walking Yes uses a   
cane  
Numbness Yes R leg  
Weakness Yes  
Trouble using your hands? Yes     
if I'm having a bad back day   
3. Have you had any injections or   
physical therapy in the last 12   
months?  
Yes  
If  YES  then please ask for the   
name/address of the facility   
where the  
injections and/or physical   
therapy was completed  
Injections [facility]  
[address] [place] [address]  
Have you tried any other kinds of   
non-surgical treatments in the   
last 12  
months? (For example: NSAIDS,   
muscle relaxants, analgesics,   
oral steroids,  
Chiropractor, Acupuncture):   
muscle relaxants, gabapentin,   
NSAIDS  
4. Are you currently taking daily   
prescribed narcotic medications   
for your  
current symptoms (For example   
Oxycodone, Hydrocodone, Tramadol,   
Morphine,  
Other)? No  
5. Have you had previous spinal   
surgery for this same symptoms?   
Yes  
If  YES? please ask for the name   
of facility/address of where the   
surgery  
was completed:  
 [facility] (closed)  
[address] [place]   
[address]  
Additional Comments [phone]        [facility]  
   
                                                    2023 Hospital   
Discharge instructions                    
  
  
Patient Education  
  
  
2023 11:37:49  
  
  
Testicular Self-Exam  
  
  
Testicular Self-Exam  
A self-examination of your   
testicles (testicular self-exam)   
involves looking at and feeling   
your testicles for abnormal lumps   
or swelling. Several things can   
cause swelling, lumps, or pain in   
your testicles. Some of these   
causes are:  
Injuries.  
Inflammation.  
Infection.  
Buildup of fluids around your   
testicle (hydrocele).  
Twisted testicles (testicular   
torsion).  
Testicular cancer.  
Why is it important to do a   
testicular self-exam?  
Self-examination of the testicles   
and the left and right groin   
areas may be recommended if you   
are at risk for testicular   
cancer. Your groin is where your   
lower abdomen meets your upper   
thighs. You may be at risk for   
testicular cancer if you have:  
An undescended testicle   
(cryptorchidism).  
A history of previous testicular   
cancer.  
A family history of testicular   
cancer.  
How to do a testicular self-exam  
The testicles are easiest to   
examine after a warm bath or   
shower. They are more difficult   
to examine when you are cold.   
This is because the muscles   
attached to the testicles retract   
and pull them up higher or into   
the abdomen.  
A normal testicle is egg-shaped   
and feels firm. It is smooth and   
not tender. The spermatic cord   
can be felt as a firm,   
spaghetti-like cord at the back   
of your testicle.  
Look and feel for changes  
Stand and hold your penis away   
from your body.  
Look at each testicle to check   
for lumps or swelling.  
Roll each testicle between your   
thumb and forefinger, feeling the   
entire testicle. Feel for:  
?Lumps.  
?Swelling.  
?Discomfort.  
Check the groin area between your   
abdomen and upper thighs on both   
sides of your body. Look and feel   
for any swelling or bumps that   
are tender. These could be   
enlarged lymph nodes.  
Contact a health care provider   
if:  
You find any bumps or lumps, such   
as a small, hard, pea-sized lump.  
You find swelling, pain, or   
soreness.  
You see or feel any other changes   
in your testicles.  
Summary  
A self-examination of your   
testicles (testicular self-exam)   
involves looking at and feeling   
your testicles for any changes.  
Self-examination of the testicles   
and the left and right groin   
areas may be recommended if you   
are at risk for testicular   
cancer.  
You should check each of your   
testicles for lumps, swelling, or   
discomfort.  
You should check for swelling or   
tender bumps in your groin area   
between your lower abdomen and   
upper thighs.  
This information is not intended   
to replace advice given to you by   
your health care provider. Make   
sure you discuss any questions   
you have with your health care   
provider.  
Document Released: 2002   
Document Revised: 2020   
Document Reviewed: 2017  
[employer] Patient Education 2020   
[employer]vier Inc.  
  
  
  
Follow Up Care  
  
  
2023 15:40:21  
  
  
With:LIAN GALLAGHER PA-C, URL  
Address:  
[address]  
[place] [address]  
  
When:  
Unknown                                 [facility]  
Work Phone:   
[phone]  
   
                                                    11- Evaluation + Plan  
   
note                                      
  
  
Diagnostic Tests   
PendingTestosterone Level Total   
11/10/22Luteinizing Hormone   
11/10/22FSH Level 11/10/22  
  
                                        [facility]  
Work Phone:   
[phone]  
   
                                                    11- Hospital   
Discharge instructions                    
  
  
Patient Education  
  
  
11/10/2022 09:19:54  
  
  
Urinary Frequency, Adult  
  
  
Urinary Frequency, Adult  
Urinary frequency means urinating   
more often than usual. You may   
urinate every 1 2 hours even   
though you drink a normal amount   
of fluid and do not have a   
bladder infection or condition.   
Although you urinate more often   
than normal, the total amount of   
urine produced in a day is   
normal.  
With urinary frequency, you may   
have an urgent need to urinate   
often. The stress and anxiety of   
needing to find a bathroom   
quickly can make this urge worse.   
This condition may go away on its   
own or you may need treatment at   
home. Home treatment may include   
bladder training, exercises,   
taking medicines, or making   
changes to your diet.  
Follow these instructions at   
home:  
Bladder health  
Keep a bladder diary if told by   
your health care provider. Keep   
track of:  
?What you eat and drink.  
?How often you urinate.  
?How much you urinate.  
Follow a bladder training program   
if told by your health care   
provider. This may include:  
?Learning to delay going to the   
bathroom.  
?Double urinating (voiding). This   
helps if you are not completely   
emptying your bladder.  
?Scheduled voiding.  
Do Kegel exercises as told by   
your health care provider. Kegel   
exercises strengthen the muscles   
that help control urination,   
which may help the condition.  
Eating and drinking  
If told by your health care   
provider, make diet changes, such   
as:  
?Avoiding caffeine.  
?Drinking fewer fluids,   
especially alcohol.  
?Not drinking in the evening.  
?Avoiding foods or drinks that   
may irritate the bladder. These   
include coffee, tea, soda,   
artificial sweeteners, citrus,   
tomato-based foods, and   
chocolate.  
?Eating foods that help prevent   
or ease constipation.   
Constipation can make this   
condition worse. Your health care   
provider may recommend that you:  
?Drink enough fluid to keep your   
urine pale yellow.  
?Take over-the-counter or   
prescription medicines.  
?Eat foods that are high in   
fiber, such as beans, whole   
grains, and fresh fruits and   
vegetables.  
?Limit foods that are high in fat   
and processed sugars, such as   
fried or sweet foods.  
General instructions  
Take over-the-counter and   
prescription medicines only as   
told by your health care   
provider.  
Keep all follow-up visits as told   
by your health care provider.   
This is important.  
Contact a health care provider   
if:  
You start urinating more often.  
You feel pain or irritation when   
you urinate.  
You notice blood in your urine.  
Your urine looks cloudy.  
You develop a fever.  
You begin vomiting.  
Get help right away if:  
You are unable to urinate.  
Summary  
Urinary frequency means urinating   
more often than usual. With   
urinary frequency, you may   
urinate every 1 2 hours even   
though you drink a normal amount   
of fluid and do not have a   
bladder infection or other   
bladder condition.  
Your health care provider may   
recommend that you keep a bladder   
diary, follow a bladder training   
program, or make dietary changes.  
If told by your health care   
provider, do Kegel exercises to   
strengthen the muscles that help   
control urination.  
Take over-the-counter and   
prescription medicines only as   
told by your health care   
provider.  
Contact a health care provider if   
your symptoms do not improve or   
get worse.  
This information is not intended   
to replace advice given to you by   
your health care provider. Make   
sure you discuss any questions   
you have with your health care   
provider.  
Document Released: 10/14/2010   
Document Revised: 2019   
Document Reviewed: 2019  
[employer] Patient Education 2020   
[employer]vier Inc.  
  
  
  
Follow Up Care  
  
  
2022 15:20:54  
  
  
With:DEZ ANDERSON, LIAN LAMAS, URL  
Address:  
[address] [place] [address]  
  
When:  
Unknown                                 [facility]   
[facility] [place]  
Work Phone:   
[phone]  
   
                                                    2022 Hospital   
Discharge instructions                    
  
  
Patient Education  
  
  
2022 15:19:19  
  
  
BMI for Adults  
  
  
BMI for Adults  
Body mass index (BMI) is a number   
that is calculated from a   
person's weight and height. BMI   
may help to estimate how much of   
a person's weight is composed of   
fat. BMI can help identify those   
who may be at higher risk for   
certain medical problems.  
How is BMI used with adults?  
BMI is used as a screening tool   
to identify possible weight   
problems. It is used to check   
whether a person is obese,   
overweight, healthy weight, or   
underweight.  
How is BMI calculated?  
BMI measures your weight and   
compares it to your height. This   
can be done either in English   
([place] or metric measurements.   
Note that charts are available to   
help you find your BMI quickly   
and easily without having to do   
these calculations yourself.  
To calculate your BMI in English   
(U.S[place] measurements, your health   
care provider will:  
1.Measure your weight in pounds   
(lb).  
2.Multiply the number of pounds   
by 703.  
For example, for a person who   
weighs 180 lb, multiply that   
number by 703, which equals   
126,540.  
3.Measure your height in inches   
(in). Then multiply that number   
by itself to get a measurement   
called  inches squared.   
For example, for a person who is   
70 in tall, the  inches squared    
measurement is 70 in x 70 in,   
which equals 4900 inches squared.  
4.Divide the total from Step 2   
(number of lb x 703) by the total   
from Step 3 (inches squared):   
126,540 4900 = 25.8. This is your   
BMI.  
To calculate your BMI in metric   
measurements, your health care   
provider will:  
1.Measure your weight in   
kilograms (kg).  
2.Measure your height in meters   
(m). Then multiply that number by   
itself to get a measurement   
called  meters squared.   
For example, for a person who is   
1.75 m tall, the  meters squared    
measurement is 1.75 m x 1.75 m,   
which is equal to 3.1 meters   
squared.  
3.Divide the number of kilograms   
(your weight) by the meters   
squared number. In this example:   
70 3.1 = 22.6. This is your BMI.  
How is BMI interpreted?  
To interpret your results, your   
health care provider will use BMI   
charts to identify whether you   
are underweight, normal weight,   
overweight, or obese. The   
following guidelines will be   
used:  
Underweight: BMI less than 18.5.  
Normal weight: BMI between 18.5   
and 24.9.  
Overweight: BMI between 25 and   
29.9.  
Obese: BMI of 30 and above.  
Please note:  
Weight includes both fat and   
muscle, so someone with a   
muscular build, such as an   
athlete, may have a BMI that is   
higher than 24.9. In cases like   
these, BMI is not an accurate   
measure of body fat.  
To determine if excess body fat   
is the cause of a BMI of 25 or   
higher, further assessments may   
need to be done by a health care   
provider.  
BMI is usually interpreted in the   
same way for men and women.  
Why is BMI a useful tool?  
BMI is useful in two ways:  
Identifying a weight problem that   
may be related to a medical   
condition, or that may increase   
the risk for medical problems.  
Promoting lifestyle and diet   
changes in order to reach a   
healthy weight.  
Summary  
Body mass index (BMI) is a number   
that is calculated from a   
person's weight and height.  
BMI may help to estimate how much   
of a person's weight is composed   
of fat. BMI can help identify   
those who may be at higher risk   
for certain medical problems.  
BMI can be measured using English   
measurements or metric   
measurements.  
To interpret your results, your   
health care provider will use BMI   
charts to identify whether you   
are underweight, normal weight,   
overweight, or obese.  
This information is not intended   
to replace advice given to you by   
your health care provider. Make   
sure you discuss any questions   
you have with your health care   
provider.  
Document Released: 2005   
Document Revised: 2018   
Document Reviewed: 10/31/2018  
[employer] Patient Education 2020   
[employer] Inc.  
  
  
  
Follow Up Care  
  
  
2022 08:52:21  
  
  
With:Jimmy SHERWOOD, Aisha VILLA URL, URO  
Address:  
[address] [place] [address]  
[phone]  
  
When:2022  
Comments:Voiding diary, blood   
work                                    [facility]   
[facility]  
Work Phone:   
[phone]  
   
                                                    2022 Hospital   
Discharge instructions                    
  
  
Patient Education  
  
  
2022 08:53:03  
  
  
EU - Cystoscopy Discharge   
Instructions (CUSTOM)  
  
  
Cystoscopy  
Voiding after the procedure:   
there may be some pain, burning,   
urgency, frequency and blood   
tinged urine following the   
procedure. These symptoms usually   
resolve within 2-5 days. Drink   
the amount of fluid it takes to   
keep the urine pink to yellow or   
clear in color. Drinking enough   
water and fluids will help to   
ease any discomfort after your   
procedure.  
If you are having problems that   
seem out of the ordinary, please   
call.  
If unable to contact your   
physician and you feel it is an   
emergency, go to the nearest   
emergency room or call 911  
Diet you may resume your normal   
diet.  
Activity you may resume your   
normal activities  
Call if you have a fever over 100   
degrees.  
  
  
  
Follow Up Care  
  
  
2022 13:32:43  
  
  
With:Aisha Marquez  
Address:  
[address] [address]  
[phone] Business (1)  
  
When:  
Unknown  
Comments:Office will call to   
schedule follow up in 2 months  
  
  
With:Aisha Marquez  
Address:Unknown  
When:  
Unknown                                 [facility]  
   
                                                    2022 Hospital   
Discharge instructions                    
  
  
Patient Education  
  
  
2022 10:14:15  
  
  
Urinary Incontinence  
  
  
Urinary Incontinence  
Urinary incontinence refers to a   
condition in which a person is   
unable to control where and when   
to pass urine. A person with this   
condition will urinate when he or   
she does not mean to   
(involuntarily).  
What are the causes?  
This condition may be caused by:  
Medicines.  
Infections.  
Constipation.  
Overactive bladder muscles.  
Weak bladder muscles.  
Weak pelvic floor muscles. These   
muscles provide support for the   
bladder, intestine, and, in   
women, the uterus.  
Enlarged prostate in men. The   
prostate is a gland near the   
bladder. When it gets too big, it   
can pinch the urethra. With the   
urethra blocked, the bladder can   
weaken and lose the ability to   
empty properly.  
Surgery.  
Emotional factors, such as   
anxiety, stress, or   
post-traumatic stress disorder   
(PTSD).  
Pelvic organ prolapse. This   
happens in women when organs   
shift out of place and into the   
vagina. This shift can prevent   
the bladder and urethra from   
working properly.  
What increases the risk?  
The following factors may make   
you more likely to develop this   
condition:  
Older age.  
Obesity and physical inactivity.  
Pregnancy and childbirth.  
Menopause.  
Diseases that affect the nerves   
or spinal cord (neurological   
diseases).  
Long-term (chronic) coughing.   
This can increase pressure on the   
bladder and pelvic floor muscles.  
What are the signs or symptoms?  
Symptoms may vary depending on   
the type of urinary incontinence   
you have. They include:  
A sudden urge to urinate, but   
passing urine involuntarily   
before you can get to a bathroom   
(urge incontinence).  
Suddenly passing urine with any   
activity that forces urine to   
pass, such as coughing, laughing,   
exercise, or sneezing (stress   
incontinence).  
Needing to urinate often, but   
urinating only a small amount, or   
constantly dribbling urine   
(overflow incontinence).  
Urinating because you cannot get   
to the bathroom in time due to a   
physical disability, such as   
arthritis or injury, or   
communication and thinking   
problems, such as Alzheimer   
disease (functional   
incontinence).  
How is this diagnosed?  
This condition may be diagnosed   
based on:  
Your medical history.  
A physical exam.  
Tests, such as:  
?Urine tests.  
?X-rays of your kidney and   
bladder.  
?Ultrasound.  
?CT scan.  
?Cystoscopy. In this procedure, a   
health care provider inserts a   
tube with a light and camera   
(cystoscope) through the urethra   
and into the bladder in order to   
check for problems.  
?Urodynamic testing. These tests   
assess how well the bladder,   
urethra, and sphincter can store   
and release urine. There are   
different types of urodynamic   
tests, and they vary depending on   
what the test is measuring.  
To help diagnose your condition,   
your health care provider may   
recommend that you keep a log of   
when you urinate and how much you   
urinate.  
How is this treated?  
Treatment for this condition   
depends on the type of   
incontinence that you have and   
its cause. Treatment may include:  
Lifestyle changes, such as:  
?Quitting smoking.  
?Maintaining a healthy weight.  
?Staying active. Try to get 150   
minutes of moderate-intensity   
exercise every week. Ask your   
health care provider which   
activities are safe for you.  
?Eating a healthy diet.  
?Avoid high-fat foods, like fried   
foods.  
?Avoid refined carbohydrates like   
white bread and white rice.  
?Limit how much alcohol and   
caffeine you drink.  
?Increase your fiber intake.   
Foods such as fresh fruits,   
vegetables, beans, and whole   
grains are healthy sources of   
fiber.  
Pelvic floor muscle exercises.  
Bladder training, such as   
lengthening the amount of time   
between bathroom breaks, or using   
the bathroom at regular   
intervals.  
Using techniques to suppress   
bladder urges. This can include   
distraction techniques or   
controlled breathing exercises.  
Medicines to relax the bladder   
muscles and prevent bladder   
spasms.  
Medicines to help slow or prevent   
the growth of a man's prostate.  
Botox injections. These can help   
relax the bladder muscles.  
Using pulses of electricity to   
help change bladder reflexes   
(electrical nerve stimulation).  
For women, using a medical device   
to prevent urine leaks. This is a   
small, tampon-like, disposable   
device that is inserted into the   
urethra.  
Injecting collagen or carbon   
beads (bulking agents) into the   
urinary sphincter. These can help   
thicken tissue and close the   
bladder opening.  
Surgery.  
Follow these instructions at   
home:  
Lifestyle  
Limit alcohol and caffeine. These   
can fill your bladder quickly and   
irritate it.  
Keep yourself clean to help   
prevent odors and skin damage.   
Ask your doctor about special   
skin creams and cleansers that   
can protect the skin from urine.  
Consider wearing pads or adult   
diapers. Make sure to change them   
regularly, and always change them   
right after experiencing   
incontinence.  
General instructions  
Take over-the-counter and   
prescription medicines only as   
told by your health care   
provider.  
Use the bathroom about every 3 4   
hours, even if you do not feel   
the need to urinate. Try to empty   
your bladder completely every   
time. After urinating, wait a   
minute. Then try to urinate   
again.  
Make sure you are in a relaxed   
position while urinating.  
If your incontinence is caused by   
nerve problems, keep a log of the   
medicines you take and the times   
you go to the bathroom.  
Keep all follow-up visits as told   
by your health care provider.   
This is important.  
Contact a health care provider   
if:  
You have pain that gets worse.  
Your incontinence gets worse.  
Get help right away if:  
You have a fever or chills.  
You are unable to urinate.  
You have redness in your groin   
area or down your legs.  
Summary  
Urinary incontinence refers to a   
condition in which a person is   
unable to control where and when   
to pass urine.  
This condition may be caused by   
medicines, infection, weak   
bladder muscles, weak pelvic   
floor muscles, enlargement of the   
prostate (in men), or surgery.  
The following factors increase   
your risk for developing this   
condition: older age, obesity,   
pregnancy and childbirth,   
menopause, neurological diseases,   
and chronic coughing.  
There are several types of   
urinary incontinence. They   
include urge incontinence, stress   
incontinence, overflow   
incontinence, and functional   
incontinence.  
This condition is usually treated   
first with lifestyle and   
behavioral changes, such as   
quitting smoking, eating a   
healthier diet, and doing regular   
pelvic floor exercises. Other   
treatment options include   
medicines, bulking agents,   
medical devices, electrical nerve   
stimulation, or surgery.  
This information is not intended   
to replace advice given to you by   
your health care provider. Make   
sure you discuss any questions   
you have with your health care   
provider.  
Document Released: 2006   
Document Revised: 2018   
Document Reviewed: 2018  
[employer] Patient Education 2020   
[employer] Inc.  
  
  
  
Follow Up Care  
  
  
2022 09:38:20  
  
  
With:Aisha Marquez MD, URL, URO  
Address:  
  
When:  
Unknown  
Comments:will schedule a cysto          [facility]   
[facility]  
Work Phone:   
[phone]  
   
                                        Evaluation + Plan note   
  
  
Future Appointments  
  
  
Appointment Date:2022   
11:45:00 AM  
Scheduled Provider:  
Location:[facility]   
Surgical Services  
Appointment Type:Urology CALL PAT   
FT  
  
  
  
Appointment Date:2022   
09:00:00 AM  
Scheduled Provider:  
Location:[facility]   
Surgical Services  
Appointment Type:Urology FT  
                                        [facility]  
Work Phone:   
[phone]  
   
                                        Evaluation + Plan note   
  
  
Future Appointments  
  
  
Appointment Date:2022   
02:30:00 PM  
Scheduled Provider:Aisha Marquez MD  
Location:[facility] [place]  
Appointment Type:URO Office Visit  
                                        [facility]  
   
                                        Evaluation + Plan note   
  
  
Future Appointments  
  
  
Appointment Date:2022   
11:00:00 AM  
Scheduled Provider:Aisha Marquez MD  
Location:[facility]  
Appointment Type:URO Office Visit  
                                        [facility]   
[facility]  
Work Phone:   
[phone]  
   
                                        Evaluation + Plan note   
  
  
Future Appointments  
  
  
Appointment Date:2023   
08:30:00 AM  
Scheduled Provider:LIAN GALLAGHER PA-C  
Location:[facility]  
Appointment Type:URO Office Visit  
                                        [facility]   
[facility]  
Work Phone:   
[phone]  
   
                                        Evaluation + Plan note   
  
  
Future Appointments  
  
  
Appointment Date:10/05/2023   
01:00:00 PM  
Scheduled Provider:LIAN GALLAGHER PA-C  
Location:[facility]  
Appointment Type:URO Office Visit  
  
  
  
Diagnostic Tests   
PendingTestosterone F&T 3/30/23  
  
                                        [facility]   
[facility]  
Work Phone:   
[phone]  
   
                                        Evaluation + Plan note   
  
  
Future Appointments  
  
  
Appointment Date:2024   
01:00:00 PM  
Scheduled Provider:LIAN GALLAGHER PA-C  
Location:[facility]  
Appointment Type:URO Office Visit  
  
  
  
Diagnostic Tests   
PendingTestosterone F&T   
23FSH and LH 23  
  
                                        [facility]   
[facility]  
Work Phone:   
[phone]  
   
                                        Evaluation + Plan note   
  
  
Future Appointments  
  
  
Appointment Date:04/15/2024   
10:00:00 AM  
Scheduled Provider:LIAN GALLAGHER PA-C  
Location:[facility]  
Appointment Type:URO Office Visit  
                                        [facility]   
[facility]  
Work Phone:   
[phone]  
   
                                        Evaluation + Plan note   
  
  
Future Appointments  
  
  
Appointment Date:10/31/2024   
02:30:00 PM  
Scheduled Provider:LIAN GALLAGHER PA-C  
Location:[facility]  
Appointment Type:URO Office Visit  
  
  
  
Diagnostic Tests   
PendingTestosterone F&T   
24FSH Level   
24Luteinizing Hormone   
24  
  
                                        [facility]   
[facility]  
Work Phone:   
[phone]  
   
                                        Evaluation note     No assessment inform  
ation   
available                               [facility]  
Work Phone:   
[phone]  
  
  
  
                                                    Evaluation note   
  
  
  
                                                    Diagnosis  
   
                                                      
  
  
Spinal stenosis, lumbar region with neurogenic claudication- Primary  
  
documented in this encounter  
[facility]   
  
                                                    Diagnosis  
   
                                                      
  
  
Radiculopathy of lumbar region- Primary  
  
  
Thoracic or lumbosacral neuritis or radiculitis, unspecified  
   
                                                      
  
  
Chronic midline low back pain with right-sided sciatica  
   
                                                      
  
  
Right leg numbness  
  
  
Disturbance of skin sensation  
   
                                                      
  
  
History of back surgery  
  
  
Other postprocedural status  
  
documented in this encounter  
[facility]   
  
                                                    Diagnosis  
   
                                                      
  
  
History of back surgery- Primary  
  
  
Other postprocedural status  
   
                                                      
  
  
Acute right lumbar radiculopathy  
  
  
Thoracic or lumbosacral neuritis or radiculitis, unspecified  
  
documented in this encounter  
[facility]   
  
                                                    Diagnosis  
   
                                                      
  
  
Lumbosacral spondylosis without myelopathy- Primary  
  
documented in this encounter  
[facility]   
  
                                                    Diagnosis  
   
                                                      
  
  
Intertrigo- Primary  
  
  
Other specified erythematous condition  
   
                                                      
  
  
Ringworm  
  
  
Dermatophytosis of unspecified site  
  
documented in this encounter  
[facility]   
  
                                                    Diagn[facility]  
   
                                                      
  
  
Lumbosacral spondylosis without myelopathy  
  
documented in this encounter  
[facility]   
  
                                                    Diagnosis  
   
                                                      
  
  
Lumbar radiculopathy- Primary  
  
  
Thoracic or lumbosacral neuritis or radiculitis, unspecified  
  
documented in this encounter  
[facility]   
  
                                                    Diagnosis  
   
                                                      
  
  
Disc displacement, thoracic- Primary  
  
  
Displacement of thoracic intervertebral disc without myelopathy  
   
                                                      
  
  
Thoracic spondylosis without myelopathy  
  
documented in this encounter  
[facility]   
  
                                                    Diagnosis  
   
                                                      
  
  
Lumbosacral spondylosis without myelopathy  
  
documented in this encounter  
[facility] illness NarrativeAlexander is a 
25-year-old man who comes in today for a second opinion. He has been struggling 
withpersistent intractable back pain and buttock pain since his recent surgery 
with Dr. Jonel Ellison at [facility]. Prior to that operation
 he was having terrible leg pain. The patient does admit that his leg pain 
improved such that he was able to walk, however after surgery immediately when 
he woke up he noticed intractable pain in his but in his back. There was some 
questionablehistory as to whether or not there might have been an intraoperative
 dural defect. The patient is unsure. He denies any specific symptoms in regard 
to. Unfortunately these have persisted since his surgery greater than 6 months 
ago. He has now been to close to 10 physicians including other surgeons and pain
  to get further opinions about what to be done. His pain 
is so bad at the moment that he cannot return to work which she would very much 
like to do. He has a very difficult time sitting for any period of time.
[id number]  
Work Phone: [phone] course Narrative  
  
No data available for this section  
  
[facility]  
Work Phone: [phone] Discharge instructions  
  
No data available for this section  
  
[facility]  
Work Phone: [phone] Discharge instructions  
Additional Instructions  
1. No driving if taking narcotic pain medication.  
  
2. No lifting more than 20 pounds for 3 weeks.  
  
3. May shower.[facility]  
Work Phone: [phone] Discharge instructions  
Additional Instructions  
Follow-up with your primary care doctor  
Return to ED for worsening symptoms or concerns[facility]  
Work Phone: [phone] on filedocumented in this encounter
[facility] SystemInstructionsNot on filedocumented in this encounter
[facility]  
  
No data available for this section  
  
[facility] ([facility])* Consultation 
  (Routine) - Authorized  
  
                          Specialty    Diagnoses / Procedures Referred By Contac  
t Referred To Contact  
   
                                        Dermatology           
  
  
Diagnoses  
  
  
Intertrigo  
  
  
Ringworm  
  
  
  
Procedures  
  
  
CO OFFICE/OUTPATIENT NEW   
HIGH MDM 60 MINUTES                       
  
  
Shweta Crooks PA  
  
  
[address] [facility] [address]  
  
  
[place] [address]  
  
  
Phone: [phone]  
  
  
Fax: [phone]                         
  
  
Lian Gonzalez MD  
  
  
[address]  
  
  
[place] [address]  
  
  
Phone: [phone]  
  
  
Fax: [phone]  
  
  
  
                          Referral ID  Status       Reason       Start   
Date                                    Expiration   
Date                                    Visits   
Requested                               Visits   
Authorized  
   
                                [id number]          Authorized        
  
  
Specialty   
Services   
Required        2024        1               1  
  
  
  
  
Electronically signed by Shweta JAMES at 2024 9:56 AM EST  
  
  
[facility]  
  
Chief Complaint  
LUMBAR PAIN NPVLUMBAR PAIN NPV  
  
Summary Purpose  
  
  
                                                      
  
  
  
Family History  
  
  
                          Relationship Condition    Age at Onset Recorded Date/T  
pushpa  
   
                          father       Malignant neoplasm Unknown        
   
                          Not Specified Malignant neoplasm Unknown        
   
                                       Heart disease Unknown        
   
                                       Bipolar I disorder Unknown        
   
                          sister       Heart disease Unknown        
   
                                       High serum testosterone Unknown        
   
                          sister       Graves' disease Unknown        
   
                          brother      Anxiety      Unknown        
  
  
  
                          Relationship Condition    Age at Onset Recorded Date/T  
pushpa  
   
                          father       Malignant neoplasm Unknown        
   
                          mother       Malignant neoplasm Unknown        
   
                                       Heart disease Unknown        
   
                                       Bipolar I disorder Unknown        
   
                          sister       Heart disease Unknown        
   
                                       High serum testosterone Unknown        
   
                          sister       Graves' disease Unknown        
   
                          brother      Anxiety      Unknown        
  
  
  
                          Relationship Condition    Age at Onset Recorded Date/T  
pushpa  
   
                          father       Malignant neoplasm Unknown        
   
                          mother       Heart disease Unknown        
   
                                       Bipolar I disorder Unknown        
   
                                       Malignant neoplasm Unknown        
   
                          sister       Heart disease Unknown        
   
                                       High serum testosterone Unknown        
   
                          sister       Graves' disease Unknown        
   
                          brother      Anxiety      Unknown        
  
  
  
Advance Directives  
  
  
                                Advance Directive Response        Recorded Date/  
Time  
   
                                Advance Directives No               12:03pm  
  
  
  
                                Advance Directive Response        Recorded Date/  
Time  
   
                                Advance Directives No               1:03pm  
  
  
  
Chief Complaint and Reason for Visit  
  
  
                                        Chief Complaint     e29.1  
  
  
  
                                        Chief Complaint     Hernia  
  
  
  
                                        Chief Complaint     Hernia  
e23.0  
  
  
  
                                        Chief Complaint     Hernia  
e23.0  
Hernia  
  
  
  
                                        Chief Complaint     BH  
nausea, abd pain,  
  
  
  
                                        Chief Complaint     BH  
nausea, abd pain,  
Abdominal Pain, Nausea, Diarrhea  
  
  
  
                                        Chief Complaint     BH  
nausea, abd pain,  
Abdominal Pain, Nausea, Diarrhea  
Abdominal Pain, Nausea, Diarrhea  
  
  
  
Reason for Referral  
  
  
                          Specialty    Diagnoses / Procedures Referred By Contac  
t Referred To Contact  
   
                                        Radiology             
  
  
Diagnoses  
  
  
Disc displacement, thoracic  
  
  
Thoracic spondylosis without   
myelopathy  
  
  
  
Procedures  
  
  
MR thoracic spine without   
contrast                                  
  
  
Balbir Ronquillo PA  
  
  
[address], [address] [place] [address]  
  
  
Phone: [phone]  
  
  
Fax: [phone]                         
  
  
  
  
  
                    Referral ID Status    Reason    Start Date Expiration Date V  
isits   
Requested                               Visits   
Authorized  
   
                                        [id number]            Pending   
Review                    2024    1            1  
  
  
  
                          Specialty    Diagnoses / Procedures Referred By Contac  
t Referred To Contact  
   
                                                              
  
  
Diagnoses  
  
  
Lumbar radiculopathy  
  
  
  
Procedures  
  
  
Case request operating room:   
INJECTION SPINE   
TRANSFORAMINAL: right [id number]             
  
  
Balbir Ronquillo PA  
  
  
[address], [address] [place] [address]  
  
  
Phone: [phone]  
  
  
Fax: [phone]                         
  
  
  
  
  
                    Referral ID Status    Reason    Start Date Expiration Date V  
isits   
Requested                               Visits   
Authorized  
   
                                        [id number]             Pending   
Review                    2024    1            1  
  
  
  
                          Specialty    Diagnoses / Procedures Referred By Contac  
t Referred To Contact  
   
                                                                  
  
  
Nicki Soria PA-C  
  
  
[address], [address] [place] [address]  
  
  
Phone: [phone]  
  
  
Fax: [phone]                         
  
  
  
  
  
                    Referral ID Status    Reason    Start Date Expiration Date V  
isits   
Requested                               Visits   
Authorized  
   
                                        [id number]             Pending   
Review                                              1            1  
  
  
  
                          Specialty    Diagnoses / Procedures Referred By Contac  
t Referred To Contact  
   
                                        MR IMAGING            
  
  
Diagnoses  
  
  
Radiculopathy of lumbar   
region  
  
  
Chronic midline low back   
pain with right-sided   
sciatica  
  
  
Right leg numbness  
  
  
  
Procedures  
  
  
MRI LUMBAR SPINE WO/W IVCON  
  
  
MRI SPINAL CANAL LUMBAR W/O   
& W/CONTR MATRBabar Young, APRN.CNP  
  
  
[address] [place] [address]  
  
  
Phone: [phone]  
  
  
Fax: [phone]                         
  
  
Mr Imaging  
  
  
  
                          Referral ID  Status       Reason       Start   
Date                                    Expiration   
Date                                    Visits   
Requested                               Visits   
Authorized  
   
                                        [id number]            Pending   
Review                                    
  
  
Auto-Generat  
ed Referral     2023        1               1  
  
  
  
Additional Source Comments  
  
  
  
                                                    PREGNANCY (unrecognized sect  
ion and content)  
   
                                                    No Pregnancy Status Records   
FoundNo Pregnancy Status Records FoundNo Pregnancy   
Status   
Records FoundNo Pregnancy Status Records FoundNo Pregnancy Status Records 
FoundNo   
Pregnancy Status Records FoundNo Pregnancy Status Records FoundNo Pregnancy 
Status   
Records FoundNo Pregnancy Status Records FoundNo Pregnancy Status Records Found  
  
  
  
  
                                                    INFORMATION SOURCE (unrecogn  
ized section and content)  
   
                                          
  
  
  
                                        DATE CREATED        AUTHOR  
   
                                2021                       [email or web address]  
  
  
  
                                DATE CREATED    AUTHOR          AUTHOR'S ORGANIZ  
ATION  
   
                                2022                      [facility]  
  
  
  
                                DATE CREATED    AUTHOR          AUTHOR'S ORGANIZ  
ATION  
   
                                2022                      [facility]  
dical Specialist  
  
  
  
                                DATE CREATED    AUTHOR          AUTHOR'S ORGANIZ  
ATION  
   
                                12/10/2022                      [facility]  
  
  
  
                                DATE CREATED    AUTHOR          AUTHOR'S ORGANIZ  
ATION  
   
                                10/24/2023                      [facility]  
  
  
  
                                DATE CREATED    AUTHOR          AUTHOR'S ORGANIZ  
ATION  
   
                                2024                      [facility]  
  
  
  
                                DATE CREATED    AUTHOR          AUTHOR'S ORGANIZ  
ATION  
   
                                09/15/2024                      [facility]  
  
  
  
                                DATE CREATED    AUTHOR          AUTHOR'S ORGANIZ  
ATION  
   
                                10/01/2024                      [facility]  
dical Specialists [facility]  
  
  
  
                                DATE CREATED    AUTHOR          AUTHOR'S ORGANIZ  
ATION  
   
                                10/02/2024                      [facility]  
ysician Group  
  
  
  
                                DATE CREATED    AUTHOR          AUTHOR'S ORGANIZ  
ATION  
   
                                10/05/2024                      [facility]  
  
  
  
  
  
                                                    Care Team (unrecognized sect  
ion and content)  
   
                                          
  
  
  
                                                    Team Status: Active   
   
                          Member       Role         Status       Dates  
   
                          G. Omari Kaftan , DO Primary Care Provider Active      
     
  
  
  
                                                    Team Status: Active   
   
                          Member       Role         Status       Dates  
   
                          ANTON Teedain  Primary Care Provider Active      
   Start: 2024  
  
   
                          Daljit Dove MD Attending Provider Active    
     Start: 2024  
  
  
  
  
                                                    Team Status: Inactive   
   
                          Member       Role         Status       Dates  
   
                          ANTON Fam   Primary Care Provider Active      
   Start: 2024  
End: 2024  
   
                          Asad Castanon DO Emergency Provider Active       Sta  
rt: 2024  
End: 2024  
  
  
  
                                                    Team Status: Inactive   
   
                          Member       Role         Status       Dates  
   
                          ANTON Teedain ,  Primary Care Provider Active      
     
   
                          Matthew Agosto MD Attending Provider Active         
  
  
  
                                                    Team Status: Inactive   
   
                          Member       Role         Status       Dates  
   
                          ANTON Sanchezvance , DO Primary Care Provider Active      
     
   
                          Lian Gallagher PA-C Attending Provider Active       
    
  
  
  
                      Team Member Relationship Specialty  Start Date End Date  
   
                                                      
  
  
Effie Fam JrRico  
  
  
[address]  
  
  
  
  
  
[place] [address] [phone] (Work)  
  
  
[phone] (Fax)                 Family Medicine 23           
  
  
  
                      Team Member Relationship Specialty  Start Date End Date  
   
                                                      
  
  
Effie Fam JrRico  
  
  
[address]  
  
  
  
  
  
[place] [address] [phone] (Work)  
  
  
[phone] (Fax)                 Family Medicine 23           
  
  
  
                      Team Member Relationship Specialty  Start Date End Date  
   
                                                      
  
  
SarwatEffie Jr., DO  
  
  
NPI: [id number]  
  
  
[address]  
  
  
[place] [address] [phone] (Work)  
  
  
[phone] (Fax)                 Family Medicine 23           
   
                                                      
  
  
Mack Swain Jr., MD  
  
  
NPI: [id number]  
  
  
[address], [place] [address] [phone] (Work)  
  
  
[phone] (Fax) Pain Management Anesthesiology  10/23/23          
   
                                                      
  
  
Balbir Ronquillo  
  
  
NPI: [id number]  
  
  
[address] [place] [address]  
  
  
[phone] (Work)  
  
  
[phone] (Fax) Pain Management Pain Management 10/23/23          
  
  
  
                      Team Member Relationship Specialty  Start Date End Date  
   
                                                      
  
  
Effie Fam Jr., DO  
  
  
NPI: [id number]  
  
  
[id number]  
  
  
[place] [address]  
  
  
[phone] (Work)  
  
  
[phone] (Fax) PCP - General   Family Medicine 21          
  
  
  
                      Team Member Relationship Specialty  Start Date End Date  
   
                                                      
  
  
Effie Fam Jr., DO  
  
  
NPI: [id number]  
  
  
[address]  
  
  
[place] [address]  
  
  
[phone] (Work)  
  
  
[phone] (Fax) PCP - General   Family Medicine 21          
  
  
  
                      Team Member Relationship Specialty  Start Date End Date  
   
                                                      
  
  
Effie Fam, DO  
  
  
NPI: [id number]  
  
  
[address]  
  
  
[place] [address]  
  
  
[phone] (Work)  
  
  
[phone] (Fax) PCP - General   Family [facility] 23            
   
                                                      
  
  
Effie Fam, DO  
  
  
NPI: [id number]  
  
  
[address]   
[address]  
  
  
[place] [address]  
  
  
[phone] (Work)  
  
  
[phone] (Fax) PCP - [facility]                 23            
  
  
  
                      Team Member Relationship Specialty  Start Date End Date  
   
                                                      
  
  
Effie Fam, DO  
  
  
NPI: [id number]  
  
  
[address]   
[address]  
  
  
[place] [address]  
  
  
[phone] (Work)  
  
  
[phone] (Fax) PCP - General   Family Medicine 23            
   
                                                      
  
  
Effie Fam, DO  
  
  
NPI: [id number]  
  
  
[address]   
[address]  
  
  
[place] [address]  
  
  
[phone] (Work)  
  
  
[phone] (Fax) PCP - [facility]                 23            
  
  
  
                      Team Member Relationship Specialty  Start Date End Date  
   
                                                      
  
  
Effie Fam Jr., DO  
  
  
NPI: [id number]  
  
  
[id number]  
  
  
[place] [address]  
  
  
[phone] (Work)  
  
  
[phone] (Fax) PCP - General   Family Medicine 21          
  
  
  
                      Team Member Relationship Specialty  Start Date End Date  
   
                                                      
  
  
Effie Fam Jr., DO  
  
  
NPI: [id number]  
  
  
[address]  
  
  
[place] [address]  
  
  
[phone] (Work)  
  
  
[phone] (Fax) PCP - General   Family Medicine 21          
  
  
  
                                                    Team Status: Inactive   
   
                          Member       Role         Status       Dates  
   
                          ANTON Salcido DO Sarwat Primary Care Provider Active      
   Start: 2024  
End: 2024  
   
                          Matthew Agosto MD Attending Provider Active       Sta  
rt: 2024  
End: 2024  
  
  
  
                                                    Team Status: Inactive   
   
                          Member       Role         Status       Dates  
   
                          ANTON Salcido DO Sarwat Primary Care Provider Active      
   Start: 2024  
End: 2024  
   
                          Matthew Agosto MD Attending Provider Active       Sta  
rt: 2024  
End: 2024  
  
  
  
                      Team Member Relationship Specialty  Start Date End Date  
   
                                                      
  
  
DanielEffie woodard Jr., DO  
  
  
NPI: [id number]  
  
  
[address] [place] [address], [address]  
  
  
[place] [id number]  
  
  
[phone] (Work)  
  
  
[phone] (Fax) PCP - General   Family Medicine 21          
  
  
  
                      Team Member Relationship Specialty  Start Date End Date  
   
                                                      
  
  
Effie Fam Jr.,   
  
  
NPI: [id number]  
  
  
[address] [place] [address], [address]  
  
  
[place] [address]  
  
  
[phone] (Work)  
  
  
[phone] (Fax) PCP - General   Family Medicine 21          
  
  
  
  
  
                                                    Goals (unrecognized section   
and content)  
   
                                                    Goals may be documented in a  
n alternate section  
  
  
  
  
                                                    Source Comments (unrecognize  
d section and content)  
   
                                                    In the event this informatio  
n is protected by the Federal Confidentiality of   
Alcohol   
and Drug Abuse Patient Records regulations: This information has been disclosed 
to   
you from records protected by Federal confidentiality rules (42 CFR Part 2). The
   
Federal rules prohibit you from making any further disclosure of this 
information   
unless further disclosure is expressly permitted by the written consent of the 
person   
to whom it pertains or as otherwise permitted by 42 CFR Part 2. A general   
authorization for the release of medical or other information is NOT sufficient 
for   
this purpose. The Federal rules restrict any use of the information to 
criminally   
investigate or prosecute any alcohol or drug abuse patient.[facility] 
the   
event this information is protected by the Federal Confidentiality of Alcohol 
and   
Drug Abuse Patient Records regulations: This information has been disclosed to 
you   
from records protected by Federal confidentiality rules (42 CFR Part 2). The 
Federal   
rules prohibit you from making any further disclosure of this information unless
   
further disclosure is expressly permitted by the written consent of the person 
to   
whom it pertains or as otherwise permitted by 42 CFR Part 2. A general 
authorization   
for the release of medical or other information is NOT sufficient for this 
purpose.   
The Federal rules restrict any use of the information to criminally investigate 
or   
prosecute any alcohol or drug abuse patient.[facility] CenterIn the event this   
information is protected by the Federal Confidentiality of Alcohol and Drug 
Abuse   
Patient Records regulations: This information has been disclosed to you from 
records   
protected by Federal confidentiality rules (42 CFR Part 2). The Federal rules   
prohibit you from making any further disclosure of this information unless 
further   
disclosure is expressly permitted by the written consent of the person to whom 
it   
pertains or as otherwise permitted by 42 CFR Part 2. A general authorization for
 the   
release of medical or other information is NOT sufficient for this purpose. The   
Federal rules restrict any use of the information to criminally investigate or   
prosecute any alcohol or drug abuse patient.[facility]  
  
  
  
  
                                                    Reason for Visit (unrecogniz  
ed section and content)  
   
                                          
  
  
  
                                        Reason              Comments  
   
                                        New Patient           
  
  
  
                                        Reason              Comments  
   
                                        Follow Up             
   
                                        Established Patient   
  
  
  
                                        Reason              Comments  
   
                                        Back Pain             
  
  
  
                                Reason          Onset Date      Comments  
   
                                Med Refill      2024        
  
  
  
                                        Reason              Comments  
   
                                        Rash                Patient presents wit  
h complains of ongoing rash, possible ringworm, on left  
   
underarm since late-2023. Patient reports it burns when water comes   
into contact with it. Patient does not wear deodorant, stating he is allergic   
to ' most of them'. Patient states previous treatment included antibiotics and   
antifungal cream which did not provide relief.  
  
  
  
                                Reason          Onset Date      Comments  
   
                                Med Refill      2024        
  
  
  
                                        Reason              Comments  
   
                                        Back Pain             
  
  
  
                                Reason          Onset Date      Comments  
   
                                Med Refill      10/07/2024        
  
  
FOR RECORDS PERTAINING TO PATIENTS WHO ARE OR HAVE BEEN ENROLLED IN A CHEMICAL 
DEPENDENCY/SUBSTANCEABUSE PROGRAM, SOME INFORMATION MAY BE OMITTED. This 
clinical summary was aggregated from multiple sources. Caution should be 
exercised in using it in the provision of clinical care. This summary normalizes
 information from multiple sources, and as a consequence, information in this 
document may materially change the coding, format and clinical context of 
patient data. In addition, data may be omitted in some cases. CLINICAL DECISIONS
 SHOULD BE BASED ON THE PRIMARY CLINICAL RECORDS. [employer] [facility]. provides
 no warranty or guarantee of the accuracy or completeness of information in this
 document.